# Patient Record
Sex: MALE | Race: WHITE | NOT HISPANIC OR LATINO | Employment: PART TIME | URBAN - METROPOLITAN AREA
[De-identification: names, ages, dates, MRNs, and addresses within clinical notes are randomized per-mention and may not be internally consistent; named-entity substitution may affect disease eponyms.]

---

## 2017-01-31 ENCOUNTER — HOSPITAL ENCOUNTER (EMERGENCY)
Facility: HOSPITAL | Age: 57
Discharge: HOME | End: 2017-02-01
Attending: EMERGENCY MEDICINE
Payer: OTHER MISCELLANEOUS

## 2017-01-31 ENCOUNTER — APPOINTMENT (EMERGENCY)
Dept: RADIOLOGY | Facility: HOSPITAL | Age: 57
End: 2017-01-31
Payer: OTHER MISCELLANEOUS

## 2017-01-31 DIAGNOSIS — S42.121A CLOSED DISPLACED FRACTURE OF RIGHT ACROMIAL PROCESS, INITIAL ENCOUNTER: Primary | ICD-10-CM

## 2017-01-31 DIAGNOSIS — I10 HYPERTENSION: ICD-10-CM

## 2017-01-31 PROCEDURE — 73030 X-RAY EXAM OF SHOULDER: CPT

## 2017-01-31 RX ORDER — ACETAMINOPHEN 325 MG/1
650 TABLET ORAL ONCE
Status: COMPLETED | OUTPATIENT
Start: 2017-01-31 | End: 2017-01-31

## 2017-01-31 RX ADMIN — ACETAMINOPHEN 650 MG: 325 TABLET ORAL at 23:52

## 2017-02-01 VITALS
BODY MASS INDEX: 28.7 KG/M2 | SYSTOLIC BLOOD PRESSURE: 236 MMHG | WEIGHT: 205 LBS | TEMPERATURE: 98.6 F | DIASTOLIC BLOOD PRESSURE: 109 MMHG | OXYGEN SATURATION: 95 % | RESPIRATION RATE: 18 BRPM | HEIGHT: 71 IN | HEART RATE: 80 BPM

## 2017-02-01 PROCEDURE — 99284 EMERGENCY DEPT VISIT MOD MDM: CPT

## 2017-02-01 RX ORDER — HYDROCODONE BITARTRATE AND ACETAMINOPHEN 5; 325 MG/1; MG/1
1 TABLET ORAL ONCE
Status: COMPLETED | OUTPATIENT
Start: 2017-02-01 | End: 2017-02-01

## 2017-02-01 RX ORDER — HYDROCODONE BITARTRATE AND ACETAMINOPHEN 5; 325 MG/1; MG/1
1 TABLET ORAL EVERY 6 HOURS PRN
Qty: 20 TABLET | Refills: 0 | Status: SHIPPED | OUTPATIENT
Start: 2017-02-01 | End: 2020-02-26 | Stop reason: ALTCHOICE

## 2017-02-01 RX ADMIN — HYDROCODONE BITARTATE AND ACETAMINOPHEN 1 TABLET: 5; 325 TABLET ORAL at 00:10

## 2018-12-05 ENCOUNTER — HOSPITAL ENCOUNTER (EMERGENCY)
Facility: HOSPITAL | Age: 58
Discharge: HOME | End: 2018-12-05
Attending: EMERGENCY MEDICINE
Payer: COMMERCIAL

## 2018-12-05 ENCOUNTER — APPOINTMENT (EMERGENCY)
Dept: CT IMAGING | Facility: HOSPITAL | Age: 58
End: 2018-12-05
Payer: COMMERCIAL

## 2018-12-05 VITALS
WEIGHT: 211 LBS | SYSTOLIC BLOOD PRESSURE: 168 MMHG | HEART RATE: 72 BPM | OXYGEN SATURATION: 96 % | RESPIRATION RATE: 18 BRPM | DIASTOLIC BLOOD PRESSURE: 90 MMHG | TEMPERATURE: 98.7 F | BODY MASS INDEX: 29.43 KG/M2

## 2018-12-05 DIAGNOSIS — I10 HTN (HYPERTENSION): Primary | ICD-10-CM

## 2018-12-05 LAB
ALBUMIN SERPL BCP-MCNC: 3.7 G/DL (ref 3.5–5)
ALP SERPL-CCNC: 54 U/L (ref 46–116)
ALT SERPL W P-5'-P-CCNC: 32 U/L (ref 12–78)
ANION GAP SERPL CALCULATED.3IONS-SCNC: 10 MMOL/L (ref 4–13)
AST SERPL W P-5'-P-CCNC: 17 U/L (ref 5–45)
BASOPHILS # BLD AUTO: 0.05 THOUSANDS/ΜL (ref 0–0.1)
BASOPHILS NFR BLD AUTO: 1 % (ref 0–1)
BILIRUB SERPL-MCNC: 0.3 MG/DL (ref 0.2–1)
BUN SERPL-MCNC: 20 MG/DL (ref 5–25)
CALCIUM SERPL-MCNC: 8.7 MG/DL (ref 8.3–10.1)
CHLORIDE SERPL-SCNC: 102 MMOL/L (ref 100–108)
CO2 SERPL-SCNC: 27 MMOL/L (ref 21–32)
CREAT SERPL-MCNC: 0.93 MG/DL (ref 0.6–1.3)
EOSINOPHIL # BLD AUTO: 0.2 THOUSAND/ΜL (ref 0–0.61)
EOSINOPHIL NFR BLD AUTO: 3 % (ref 0–6)
ERYTHROCYTE [DISTWIDTH] IN BLOOD BY AUTOMATED COUNT: 12.2 % (ref 11.6–15.1)
GFR SERPL CREATININE-BSD FRML MDRD: 91 ML/MIN/1.73SQ M
GLUCOSE SERPL-MCNC: 236 MG/DL (ref 65–140)
HCT VFR BLD AUTO: 40.8 % (ref 36.5–49.3)
HGB BLD-MCNC: 14.8 G/DL (ref 12–17)
IMM GRANULOCYTES # BLD AUTO: 0.02 THOUSAND/UL (ref 0–0.2)
IMM GRANULOCYTES NFR BLD AUTO: 0 % (ref 0–2)
LYMPHOCYTES # BLD AUTO: 2.1 THOUSANDS/ΜL (ref 0.6–4.47)
LYMPHOCYTES NFR BLD AUTO: 30 % (ref 14–44)
MCH RBC QN AUTO: 29.5 PG (ref 26.8–34.3)
MCHC RBC AUTO-ENTMCNC: 36.3 G/DL (ref 31.4–37.4)
MCV RBC AUTO: 81 FL (ref 82–98)
MONOCYTES # BLD AUTO: 0.68 THOUSAND/ΜL (ref 0.17–1.22)
MONOCYTES NFR BLD AUTO: 10 % (ref 4–12)
NEUTROPHILS # BLD AUTO: 3.98 THOUSANDS/ΜL (ref 1.85–7.62)
NEUTS SEG NFR BLD AUTO: 56 % (ref 43–75)
NRBC BLD AUTO-RTO: 0 /100 WBCS
PLATELET # BLD AUTO: 236 THOUSANDS/UL (ref 149–390)
PMV BLD AUTO: 11.1 FL (ref 8.9–12.7)
POTASSIUM SERPL-SCNC: 3.7 MMOL/L (ref 3.5–5.3)
PROT SERPL-MCNC: 7.3 G/DL (ref 6.4–8.2)
RBC # BLD AUTO: 5.01 MILLION/UL (ref 3.88–5.62)
SODIUM SERPL-SCNC: 139 MMOL/L (ref 136–145)
TROPONIN I SERPL-MCNC: 0.02 NG/ML
WBC # BLD AUTO: 7.03 THOUSAND/UL (ref 4.31–10.16)

## 2018-12-05 PROCEDURE — 70450 CT HEAD/BRAIN W/O DYE: CPT

## 2018-12-05 PROCEDURE — 36415 COLL VENOUS BLD VENIPUNCTURE: CPT | Performed by: EMERGENCY MEDICINE

## 2018-12-05 PROCEDURE — 84484 ASSAY OF TROPONIN QUANT: CPT | Performed by: EMERGENCY MEDICINE

## 2018-12-05 PROCEDURE — 96374 THER/PROPH/DIAG INJ IV PUSH: CPT

## 2018-12-05 PROCEDURE — 80053 COMPREHEN METABOLIC PANEL: CPT | Performed by: EMERGENCY MEDICINE

## 2018-12-05 PROCEDURE — 99284 EMERGENCY DEPT VISIT MOD MDM: CPT

## 2018-12-05 PROCEDURE — 93005 ELECTROCARDIOGRAM TRACING: CPT

## 2018-12-05 PROCEDURE — 85025 COMPLETE CBC W/AUTO DIFF WBC: CPT | Performed by: EMERGENCY MEDICINE

## 2018-12-05 RX ORDER — HYDROCHLOROTHIAZIDE 25 MG/1
25 TABLET ORAL DAILY
Qty: 20 TABLET | Refills: 0 | Status: SHIPPED | OUTPATIENT
Start: 2018-12-05 | End: 2019-10-09 | Stop reason: ALTCHOICE

## 2018-12-05 RX ORDER — CLONIDINE HYDROCHLORIDE 0.1 MG/1
0.2 TABLET ORAL ONCE
Status: COMPLETED | OUTPATIENT
Start: 2018-12-05 | End: 2018-12-05

## 2018-12-05 RX ORDER — LABETALOL HYDROCHLORIDE 5 MG/ML
10 INJECTION, SOLUTION INTRAVENOUS ONCE
Status: COMPLETED | OUTPATIENT
Start: 2018-12-05 | End: 2018-12-05

## 2018-12-05 RX ADMIN — LABETALOL 20 MG/4 ML (5 MG/ML) INTRAVENOUS SYRINGE 10 MG: at 17:26

## 2018-12-05 RX ADMIN — CLONIDINE HYDROCHLORIDE 0.2 MG: 0.1 TABLET ORAL at 17:25

## 2018-12-05 NOTE — ED PROVIDER NOTES
History  Chief Complaint   Patient presents with    Hypertension     patient states that he was sent by pcp for evaluation of blood pressure  was 223/150 in the office  +headache, denies chest pain     Pt  Saw his family dr  For a regular scheduled appt  And he was sent to the ER for HTN  He Used to be on 3 different meds for HTN, last took them 4 years ago because of insurance/financial reasons  Hasn't seen his family dr  In 4 years and went back today because he has insurance now   His blood pressure was 220's/150's in the office and they sent him to the ER for evaluate  Upon arrival to ER, bp is 254/135    +headache every day for the past week  No n/v, no fevers, no neck pain,  No injury  No cp, no sob  Prior to Admission Medications   Prescriptions Last Dose Informant Patient Reported? Taking? HYDROcodone-acetaminophen (NORCO) 5-325 mg per tablet   No No   Sig: Take 1 tablet by mouth every 6 (six) hours as needed for pain Max Daily Amount: 4 tablets      Facility-Administered Medications: None       Past Medical History:   Diagnosis Date    Hypertension        Past Surgical History:   Procedure Laterality Date    CARPAL TUNNEL RELEASE      CYST REMOVAL      EYE SURGERY         History reviewed  No pertinent family history  I have reviewed and agree with the history as documented  Social History   Substance Use Topics    Smoking status: Current Every Day Smoker     Types: Cigars    Smokeless tobacco: Current User     Types: Chew    Alcohol use Yes      Comment: hx of        Review of Systems   Constitutional: Negative for appetite change, fatigue and fever  HENT: Negative for rhinorrhea and sore throat  Respiratory: Negative for cough, shortness of breath and wheezing  Cardiovascular: Negative for chest pain and leg swelling  Gastrointestinal: Negative for abdominal pain, diarrhea and vomiting  Genitourinary: Negative for dysuria and flank pain     Musculoskeletal: Negative for back pain and neck pain  Skin: Negative for rash  Neurological: Positive for headaches  Negative for syncope  Psychiatric/Behavioral:        Mood normal       Physical Exam  Physical Exam   Constitutional: He is oriented to person, place, and time  He appears well-developed and well-nourished  HENT:   Head: Normocephalic and atraumatic  Mouth/Throat: Oropharynx is clear and moist    Eyes: Pupils are equal, round, and reactive to light  Neck: Normal range of motion  Neck supple  Cardiovascular: Normal rate and regular rhythm  Pulmonary/Chest: Effort normal and breath sounds normal    Abdominal: Soft  There is no tenderness  Musculoskeletal: Normal range of motion  Neurological: He is alert and oriented to person, place, and time  No cranial nerve deficit  Skin: Skin is warm and dry  Nursing note and vitals reviewed        Vital Signs  ED Triage Vitals [12/05/18 1655]   Temperature Pulse Respirations Blood Pressure SpO2   98 7 °F (37 1 °C) 82 16 (!) 254/135 96 %      Temp Source Heart Rate Source Patient Position - Orthostatic VS BP Location FiO2 (%)   Oral Monitor Sitting Right arm --      Pain Score       6           Vitals:    12/05/18 1730 12/05/18 1810 12/05/18 1830 12/05/18 1847   BP: (!) 215/106 (!) 203/100 166/90 168/90   Pulse:  71 69 72   Patient Position - Orthostatic VS: Lying Lying Lying Lying       Visual Acuity      ED Medications  Medications   cloNIDine (CATAPRES) tablet 0 2 mg (0 2 mg Oral Given 12/5/18 1725)   labetalol (NORMODYNE) injection 10 mg (10 mg Intravenous Given 12/5/18 1726)       Diagnostic Studies  Results Reviewed     Procedure Component Value Units Date/Time    Comprehensive metabolic panel [[id number]]  (Abnormal) Collected:  12/05/18 1726    Lab Status:  Final result Specimen:  Blood from Arm, Left Updated:  12/05/18 1800     Sodium 139 mmol/L      Potassium 3 7 mmol/L      Chloride 102 mmol/L      CO2 27 mmol/L      ANION GAP 10 mmol/L      BUN 20 mg/dL      Creatinine 0 93 mg/dL      Glucose 236 (H) mg/dL      Calcium 8 7 mg/dL      AST 17 U/L      ALT 32 U/L      Alkaline Phosphatase 54 U/L      Total Protein 7 3 g/dL      Albumin 3 7 g/dL      Total Bilirubin 0 30 mg/dL      eGFR 91 ml/min/1 73sq m     Narrative:         National Kidney Disease Education Program recommendations are as follows:  GFR calculation is accurate only with a steady state creatinine  Chronic Kidney disease less than 60 ml/min/1 73 sq  meters  Kidney failure less than 15 ml/min/1 73 sq  meters  Troponin I [[id number]]  (Normal) Collected:  12/05/18 1726    Lab Status:  Final result Specimen:  Blood from Arm, Left Updated:  12/05/18 1757     Troponin I 0 02 ng/mL     CBC and differential [[id number]]  (Abnormal) Collected:  12/05/18 1726    Lab Status:  Final result Specimen:  Blood from Arm, Left Updated:  12/05/18 1735     WBC 7 03 Thousand/uL      RBC 5 01 Million/uL      Hemoglobin 14 8 g/dL      Hematocrit 40 8 %      MCV 81 (L) fL      MCH 29 5 pg      MCHC 36 3 g/dL      RDW 12 2 %      MPV 11 1 fL      Platelets 260 Thousands/uL      nRBC 0 /100 WBCs      Neutrophils Relative 56 %      Immat GRANS % 0 %      Lymphocytes Relative 30 %      Monocytes Relative 10 %      Eosinophils Relative 3 %      Basophils Relative 1 %      Neutrophils Absolute 3 98 Thousands/µL      Immature Grans Absolute 0 02 Thousand/uL      Lymphocytes Absolute 2 10 Thousands/µL      Monocytes Absolute 0 68 Thousand/µL      Eosinophils Absolute 0 20 Thousand/µL      Basophils Absolute 0 05 Thousands/µL                  CT head without contrast   Final Result by Nany Schneider DO (12/05 1821)   No acute intracranial abnormality                    Workstation performed: [id number]                    Procedures  ECG 12 Lead Documentation  Date/Time: 12/5/2018 5:38 PM  Performed by: SHELTON Diaz  Authorized by: SHELTON Diaz     Rate:     ECG rate:  74    ECG rate assessment: normal Rhythm:     Rhythm: sinus rhythm    Comments:      No st elevation or depression           Phone Contacts  ED Phone Contact    ED Course                               MDM  Number of Diagnoses or Management Options  HTN (hypertension):      Amount and/or Complexity of Data Reviewed  Clinical lab tests: ordered and reviewed  Tests in the radiology section of CPT®: ordered and reviewed    Risk of Complications, Morbidity, and/or Mortality  Presenting problems: moderate  General comments: Pt  Felt better in ER, no more headache after bp lowered to 160's/90's    Stable for outpt  Follow up with his family dr Marcos Celeste Time    Disposition  Final diagnoses:   HTN (hypertension)     Time reflects when diagnosis was documented in both MDM as applicable and the Disposition within this note     Time User Action Codes Description Comment    12/5/2018  6:43 PM Indu Urban Add [I10] HTN (hypertension)       ED Disposition     ED Disposition Condition Comment    Discharge  Claudia Spring discharge to home/self care  Condition at discharge: Stable        Follow-up Information     Follow up With Specialties Details Why [address], [address]   [address]  [address] [place] [address]  [phone]            Discharge Medication List as of 12/5/2018  6:44 PM      START taking these medications    Details   hydrochlorothiazide (HYDRODIURIL) 25 mg tablet Take 1 tablet (25 mg total) by mouth daily, Starting Wed 12/5/2018, Print         CONTINUE these medications which have NOT CHANGED    Details   HYDROcodone-acetaminophen (NORCO) 5-325 mg per tablet Take 1 tablet by mouth every 6 (six) hours as needed for pain Max Daily Amount: 4 tablets, Starting 2/1/2017, Until Discontinued, Print           No discharge procedures on file      ED Provider  Electronically Signed by           Julien Amaya MD  12/05/18 1132

## 2018-12-05 NOTE — DISCHARGE INSTRUCTIONS
Chronic Hypertension, Ambulatory Care   GENERAL INFORMATION:   Chronic hypertension  is a long-term condition in which your blood pressure (BP) is higher than normal  Your BP is the force of your blood moving against the walls of your arteries  Hypertension is a BP of 140/90 or higher  Common symptoms include the following:   · Headache     · Blurred vision    · Chest pain     · Dizziness or weakness     · Trouble breathing     · Nosebleeds  Seek immediate care for the following symptoms:   · Severe headache or vision loss    · Weakness in an arm or leg    · Confusion or difficulty speaking    · Discomfort in your chest that feels like squeezing, pressure, fullness, or pain    · Suddenly feeling lightheaded or trouble breathing    · Pain or discomfort in your back, neck, jaw, stomach, or arm  Treatment for chronic hypertension  may include medicine to lower your BP  You may also need to make lifestyle changes  Take your medicine exactly as directed  Manage chronic hypertension:   · Take your BP at home  Sit and rest for 5 minutes before you take your BP  Extend your arm and support it on a flat surface  Your arm should be at the same level as your heart  Follow the directions that came with your BP monitor  If possible, take at least 2 BP readings each time  Take your BP at least twice a day at the same times each day, such as morning and evening  Keep a log of your BP readings and bring it to your follow-up visits  · Eat less sodium (salt)  Do not add sodium to your food  Limit foods that are high in sodium, such as canned foods, potato chips, and cold cuts  Your healthcare provider may suggest that you follow the [address] [place] [address] Street  The plan is low in sodium, unhealthy fats, and total fat  It is high in potassium, calcium, and fiber  · Exercise regularly  Exercise at least 30 minutes per day, on most days of the week  This will help decrease your BP   Ask your healthcare provider about the best exercise plan for you  · Limit alcohol  Women should limit alcohol to 1 drink a day  Men should limit alcohol to 2 drinks a day  A drink of alcohol is 12 ounces of beer, 5 ounces of wine, or 1½ ounces of liquor  · Do not smoke  If you smoke, it is never too late to quit  Smoking can increase your BP  Smoking also worsens other health conditions you may have that can increase your risk for hypertension  Ask your healthcare provider for information if you need help quitting  Follow up with your healthcare provider as directed: You will need to return to have your BP checked and to have other lab tests done  Write down your questions so you remember to ask them during your visits  CARE AGREEMENT:   You have the right to help plan your care  Learn about your health condition and how it may be treated  Discuss treatment options with your caregivers to decide what care you want to receive  You always have the right to refuse treatment  The above information is an  only  It is not intended as medical advice for individual conditions or treatments  Talk to your doctor, nurse or pharmacist before following any medical regimen to see if it is safe and effective for you  © 2014 [address] is for End User's use only and may not be sold, redistributed or otherwise used for commercial purposes  All illustrations and images included in CareNotes® are the copyrighted property of A D A M , Inc  or Nathan Orr

## 2018-12-08 LAB
ATRIAL RATE: 74 BPM
P AXIS: 3 DEGREES
PR INTERVAL: 174 MS
QRS AXIS: 16 DEGREES
QRSD INTERVAL: 92 MS
QT INTERVAL: 390 MS
QTC INTERVAL: 432 MS
T WAVE AXIS: 32 DEGREES
VENTRICULAR RATE: 74 BPM

## 2018-12-08 PROCEDURE — 93010 ELECTROCARDIOGRAM REPORT: CPT | Performed by: INTERNAL MEDICINE

## 2019-07-03 ENCOUNTER — TELEPHONE (OUTPATIENT)
Dept: NEUROLOGY | Facility: CLINIC | Age: 59
End: 2019-07-03

## 2019-07-30 ENCOUNTER — TELEPHONE (OUTPATIENT)
Dept: NEUROLOGY | Facility: CLINIC | Age: 59
End: 2019-07-30

## 2019-07-30 NOTE — TELEPHONE ENCOUNTER
Called patient to see if he would be interested in coming in tomorrow July 31 at 2 to see Dr Gavin Carlson  Patient is on wait list for a sooner appointment  No answer  Left voicemail

## 2019-08-05 ENCOUNTER — TELEPHONE (OUTPATIENT)
Dept: NEUROLOGY | Facility: CLINIC | Age: 59
End: 2019-08-05

## 2019-08-07 ENCOUNTER — HOSPITAL ENCOUNTER (EMERGENCY)
Facility: HOSPITAL | Age: 59
Discharge: HOME | End: 2019-08-07
Attending: EMERGENCY MEDICINE
Payer: COMMERCIAL

## 2019-08-07 VITALS
DIASTOLIC BLOOD PRESSURE: 110 MMHG | RESPIRATION RATE: 18 BRPM | SYSTOLIC BLOOD PRESSURE: 193 MMHG | TEMPERATURE: 99.2 F | HEART RATE: 108 BPM | OXYGEN SATURATION: 95 %

## 2019-08-07 DIAGNOSIS — S61.011A LACERATION OF RIGHT THUMB WITHOUT FOREIGN BODY, NAIL DAMAGE STATUS UNSPECIFIED, INITIAL ENCOUNTER: Primary | ICD-10-CM

## 2019-08-07 PROCEDURE — 99283 EMERGENCY DEPT VISIT LOW MDM: CPT | Performed by: NURSE PRACTITIONER

## 2019-08-07 PROCEDURE — 99282 EMERGENCY DEPT VISIT SF MDM: CPT

## 2019-08-07 RX ORDER — IBUPROFEN 600 MG/1
600 TABLET ORAL ONCE
Status: COMPLETED | OUTPATIENT
Start: 2019-08-07 | End: 2019-08-07

## 2019-08-07 RX ORDER — IBUPROFEN 800 MG/1
800 TABLET ORAL 3 TIMES DAILY
Qty: 21 TABLET | Refills: 0 | Status: SHIPPED | OUTPATIENT
Start: 2019-08-07 | End: 2019-10-09 | Stop reason: SDUPTHER

## 2019-08-07 RX ADMIN — IBUPROFEN 600 MG: 600 TABLET ORAL at 22:29

## 2019-08-08 NOTE — ED PROVIDER NOTES
History  Chief Complaint   Patient presents with    Finger Laceration     Per Pt " I was slicing onions and I cut my R thumb "      This is a 27-year-old male patient presents with right thumb laceration skin avulsion from mandoline  He was cutting onions and accidentally sliced off the distal finger tip of the right thumb  Reports that he immediately washed the wound with soap and water and applied pressure  He presents here no longer bleeding is approximately 1 cm right 2 cm  Reports his tetanus is up-to-date  Prior to Admission Medications   Prescriptions Last Dose Informant Patient Reported? Taking? HYDROcodone-acetaminophen (NORCO) 5-325 mg per tablet   No No   Sig: Take 1 tablet by mouth every 6 (six) hours as needed for pain Max Daily Amount: 4 tablets   hydrochlorothiazide (HYDRODIURIL) 25 mg tablet   No No   Sig: Take 1 tablet (25 mg total) by mouth daily      Facility-Administered Medications: None       Past Medical History:   Diagnosis Date    Hypertension        Past Surgical History:   Procedure Laterality Date    CARPAL TUNNEL RELEASE      CYST REMOVAL      EYE SURGERY         History reviewed  No pertinent family history  I have reviewed and agree with the history as documented  Social History     Tobacco Use    Smoking status: Current Every Day Smoker     Types: Cigars    Smokeless tobacco: Current User     Types: Chew   Substance Use Topics    Alcohol use: Yes     Comment: hx of    Drug use: No        Review of Systems   Constitutional: Negative for diaphoresis, fatigue and fever  HENT: Negative for congestion, ear pain, nosebleeds and sore throat  Eyes: Negative for photophobia, pain, discharge and visual disturbance  Respiratory: Negative for cough, choking, chest tightness, shortness of breath and wheezing  Cardiovascular: Negative for chest pain and palpitations  Gastrointestinal: Negative for abdominal distention, abdominal pain, diarrhea and vomiting  Genitourinary: Negative for dysuria, flank pain and frequency  Musculoskeletal: Negative for back pain, gait problem and joint swelling  Skin: Positive for wound  Negative for color change and rash  Neurological: Negative for dizziness, syncope and headaches  Psychiatric/Behavioral: Negative for behavioral problems and confusion  The patient is not nervous/anxious  All other systems reviewed and are negative  Physical Exam  Physical Exam   Constitutional: He is oriented to person, place, and time  He appears well-developed and well-nourished  HENT:   Head: Normocephalic and atraumatic  Eyes: Pupils are equal, round, and reactive to light  Neck: Normal range of motion  Neck supple  Cardiovascular: Normal rate, regular rhythm, normal heart sounds and normal pulses  PMI is not displaced  Pulmonary/Chest: Effort normal and breath sounds normal  No respiratory distress  Abdominal: Soft  He exhibits no distension  There is no guarding  Musculoskeletal: Normal range of motion  Lymphadenopathy:     He has no cervical adenopathy  Neurological: He is alert and oriented to person, place, and time  Skin: Skin is warm and dry  No rash noted  He is not diaphoretic  No pallor  Right thumb skin laceration avulsion 1 cm x 2 cm  No deep tissue involvement  Superficial   No longer bleeding  Psychiatric: He has a normal mood and affect  Vitals reviewed        Vital Signs  ED Triage Vitals [08/07/19 2115]   Temperature Pulse Respirations Blood Pressure SpO2   99 2 °F (37 3 °C) (!) 108 18 (!) 193/110 95 %      Temp Source Heart Rate Source Patient Position - Orthostatic VS BP Location FiO2 (%)   Oral Monitor Sitting Left arm --      Pain Score       --           Vitals:    08/07/19 2115   BP: (!) 193/110   Pulse: (!) 108   Patient Position - Orthostatic VS: Sitting         Visual Acuity      ED Medications  Medications   ibuprofen (MOTRIN) tablet 600 mg (has no administration in time range) Diagnostic Studies  Results Reviewed     None                 No orders to display              Procedures  Procedures       ED Course                               MDM  Number of Diagnoses or Management Options  Laceration of right thumb without foreign body, nail damage status unspecified, initial encounter: new and requires workup  Diagnosis management comments: The right thumb laceration wound was dressed with surgifoam topical hemostatic, Xeroform gauze, plain gauze wrap, followed by [facility]  Provided supplies for the next 2-3 days for daily dressing changes    Patient Progress  Patient progress: stable      Disposition  Final diagnoses:   Laceration of right thumb without foreign body, nail damage status unspecified, initial encounter     Time reflects when diagnosis was documented in both MDM as applicable and the Disposition within this note     Time User Action Codes Description Comment    8/7/2019 10:12 PM Daryl Estrada Add [S61 011A] Laceration of right thumb without foreign body, nail damage status unspecified, initial encounter       ED Disposition     ED Disposition Condition Date/Time Comment    Discharge Stable Wed Aug 7, 2019 10:12 PM Laxmi Kemp discharge to home/self care  Follow-up Information     Follow up With Specialties Details Why [address] MD Family Medicine Schedule an appointment as soon as possible for a visit  As needed, For Recheck [place] [address]  [address]  [place]  [address] [place] [address]  [phone]            Patient's Medications   Discharge Prescriptions    IBUPROFEN (MOTRIN) 800 MG TABLET    Take 1 tablet (800 mg total) by mouth 3 (three) times a day       Start Date: 8/7/2019  End Date: --       Order Dose: 800 mg       Quantity: 21 tablet    Refills: 0     No discharge procedures on file      ED Provider  Electronically Signed by           JORDYN Soler  08/07/19 2821

## 2019-09-11 ENCOUNTER — EVALUATION (OUTPATIENT)
Dept: PHYSICAL THERAPY | Facility: CLINIC | Age: 59
End: 2019-09-11
Payer: COMMERCIAL

## 2019-09-11 DIAGNOSIS — M75.111 INCOMPLETE TEAR OF RIGHT ROTATOR CUFF, UNSPECIFIED WHETHER TRAUMATIC: Primary | ICD-10-CM

## 2019-09-11 PROCEDURE — 97140 MANUAL THERAPY 1/> REGIONS: CPT

## 2019-09-11 PROCEDURE — 97535 SELF CARE MNGMENT TRAINING: CPT

## 2019-09-11 PROCEDURE — 97161 PT EVAL LOW COMPLEX 20 MIN: CPT

## 2019-09-11 NOTE — LETTER
2019    MD [address], [address] [facility]    Patient: Ariel Salter   YOB: 1960   Date of Visit: 2019     Encounter Diagnosis     ICD-10-CM    1  Incomplete tear of right rotator cuff, unspecified whether traumatic M75 111        Dear Dr Yolanda Nicholas: Thank you for your recent referral of Ariel Salter  Please review the attached evaluation summary from Farzad's recent visit  Please verify that you agree with the plan of care by signing the attached order  If you have any questions or concerns, please do not hesitate to call  I sincerely appreciate the opportunity to share in the care of one of your patients and hope to have another opportunity to work with you in the near future  Sincerely,    Sylvester Sy, PT      Referring Provider:      I certify that I have read the below Plan of Care and certify the need for these services furnished under this plan of treatment while under my care  MD Olga Pat[address]io [address]: [phone]          PT Evaluation     Today's date: 2019  Patient name: Ariel Salter  : 1960  MRN: [id number]  Referring provider: Fatimah Graham MD  Dx:   Encounter Diagnosis     ICD-10-CM    1  Incomplete tear of right rotator cuff, unspecified whether traumatic M75 111                   Assessment  Assessment details: Pt presents s/p Right RTC repair on 19  Hx prior right shoulder surgery 7-8 years ago  Presents in sling but reports RUE out of sling at home and with sleep  Reports continued pain right shoulder as well as altered sensation right hand  PT notes increased tenderness to palpation right shoulder and atrophy of shoulder and UE musculature  Decreased ROM noted all planes  Strength not tested  PT cautioned pt on use of RUE and to continue use of sling    Pt will benefit from PT tx to decrease symptoms and restore normal functional level  Impairments: abnormal or restricted ROM, activity intolerance, impaired physical strength, lacks appropriate home exercise program and pain with function    Goals  ST  Decrease pain 50% 8 wk  2  Increase shoulder PROM to [facility] 8 wk  3  Increase shoulder strength to 3+/5 8 wk  Pt will report no difficulty with activity below shoulder level 8 wk  LT  Pt will report no pain 16 wk  2  Increase shoulder AROM to WNL 16 wk  3  Increase shoulder strength to WNL 16 wk  Pt will report no limitations with ADL's 16 wk    Plan  Patient would benefit from: PT eval  Planned modality interventions: cryotherapy, thermotherapy: hydrocollator packs and unattended electrical stimulation  Planned therapy interventions: joint mobilization, manual therapy, strengthening, stretching, therapeutic activities, therapeutic exercise, flexibility, functional ROM exercises and home exercise program  Frequency: 2x week  Duration in weeks: 16  Treatment plan discussed with: patient        Subjective Evaluation    History of Present Illness  Date of surgery: 2019  Mechanism of injury: Pt presents s/p right RTC repair on 19  Reports prior right shoulder surgery approx 7-8 years ago as well  Reports falling 2 years ago onto right arm/elbow and hearing "snap/pop"  Went to ER and reports he had fx  Reports pain and limited use of UE since that time  Functional level varied  RTC surgery advised to help with this  Reports continued pain since surgery  Reports intermittent numbness right hand and tingling sensation down RUE  Next MD follow up 19  Reports he is not using sling during the day or with sleep at night  Reports using RUE to assist lifting grandson and with ADL's in limited manner  Has returned to driving      Pain  Current pain ratin  At best pain ratin  At worst pain ratin  Location: Right shoulder  Quality: dull ache and sharp    Hand dominance: right  Exercise history: enjoys [employer]   Life stress: Works as [employer]  Has not been able to do this due to shoulder  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  Objective     Observations     Right Shoulder  Positive for atrophy and incision  Additional Observation Details  Atrophy noted right shoulder/UE  Old well healed incision noted AC joint region  Well healing incisions from current surgery     Tenderness     Right Shoulder  Tenderness in the biceps tendon (proximal), clavicle, lateral scapula and medial scapula  Additional Tenderness Details  Increased tenderness over anterior portal incision    Cervical/Thoracic Screen   Cervical range of motion within normal limits    Passive Range of Motion     Right Shoulder   Flexion: 95 degrees   Abduction: 65 degrees   External rotation 45°:  24 degrees   Internal rotation 45°:  45 degrees     Right Elbow   Normal passive range of motion    Strength/Myotome Testing     Additional Strength Details  Strength not tested                Precautions:  S/P Right RTC Tear    See Protocol       Manual  9-11-19       Right Shoulder PROM 15'                                           Exercise Diary  9-11-19       Pendulums         shrugs        retractions        Elbow flex/ext        gripper        pulleys        Sub-max flex, ext, add, IR        Table slides                                                                                                HEP Instructed and issued HEP           Modalities 9-11-19       CP 5'       CP with IFC

## 2019-09-11 NOTE — PROGRESS NOTES
PT Evaluation     Today's date: 2019  Patient name: Mayito Henry  : 1960  MRN: [id number]  Referring provider: Phani Solorzano MD  Dx:   Encounter Diagnosis     ICD-10-CM    1  Incomplete tear of right rotator cuff, unspecified whether traumatic M75 111                   Assessment  Assessment details: Pt presents s/p Right RTC repair on 19  Hx prior right shoulder surgery 7-8 years ago  Presents in sling but reports RUE out of sling at home and with sleep  Reports continued pain right shoulder as well as altered sensation right hand  PT notes increased tenderness to palpation right shoulder and atrophy of shoulder and UE musculature  Decreased ROM noted all planes  Strength not tested  PT cautioned pt on use of RUE and to continue use of sling  Pt will benefit from PT tx to decrease symptoms and restore normal functional level  Impairments: abnormal or restricted ROM, activity intolerance, impaired physical strength, lacks appropriate home exercise program and pain with function    Goals  ST  Decrease pain 50% 8 wk  2  Increase shoulder PROM to [facility] 8 wk  3  Increase shoulder strength to 3+/5 8 wk  Pt will report no difficulty with activity below shoulder level 8 wk  LT  Pt will report no pain 16 wk  2  Increase shoulder AROM to WNL 16 wk  3  Increase shoulder strength to WNL 16 wk    Pt will report no limitations with ADL's 16 wk    Plan  Patient would benefit from: PT eval  Planned modality interventions: cryotherapy, thermotherapy: hydrocollator packs and unattended electrical stimulation  Planned therapy interventions: joint mobilization, manual therapy, strengthening, stretching, therapeutic activities, therapeutic exercise, flexibility, functional ROM exercises and home exercise program  Frequency: 2x week  Duration in weeks: 16  Treatment plan discussed with: patient        Subjective Evaluation    History of Present Illness  Date of surgery: 2019  Mechanism of injury: Pt presents s/p right RTC repair on 19  Reports prior right shoulder surgery approx 7-8 years ago as well  Reports falling 2 years ago onto right arm/elbow and hearing "snap/pop"  Went to ER and reports he had fx  Reports pain and limited use of UE since that time  Functional level varied  RTC surgery advised to help with this  Reports continued pain since surgery  Reports intermittent numbness right hand and tingling sensation down RUE  Next MD follow up 19  Reports he is not using sling during the day or with sleep at night  Reports using RUE to assist lifting grandson and with ADL's in limited manner  Has returned to driving  Pain  Current pain ratin  At best pain ratin  At worst pain ratin  Location: Right shoulder  Quality: dull ache and sharp    Hand dominance: right  Exercise history: enjoys [employer]   Life stress: Works as [employer]  Has not been able to do this due to shoulder  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  Objective     Observations     Right Shoulder  Positive for atrophy and incision  Additional Observation Details  Atrophy noted right shoulder/UE  Old well healed incision noted AC joint region  Well healing incisions from current surgery     Tenderness     Right Shoulder  Tenderness in the biceps tendon (proximal), clavicle, lateral scapula and medial scapula       Additional Tenderness Details  Increased tenderness over anterior portal incision    Cervical/Thoracic Screen   Cervical range of motion within normal limits    Passive Range of Motion     Right Shoulder   Flexion: 95 degrees   Abduction: 65 degrees   External rotation 45°: 24 degrees   Internal rotation 45°: 45 degrees     Right Elbow   Normal passive range of motion    Strength/Myotome Testing     Additional Strength Details  Strength not tested                Precautions:  S/P Right RTC Tear    See Protocol       Manual  9-11-19       Right Shoulder PROM 15'                                           Exercise Diary  9-11-19       Pendulums         shrugs        retractions        Elbow flex/ext        gripper        pulleys        Sub-max flex, ext, add, IR        Table slides                                                                                                HEP Instructed and issued HEP           Modalities 9-11-19       CP 5'       CP with IFC

## 2019-09-12 ENCOUNTER — OFFICE VISIT (OUTPATIENT)
Dept: PHYSICAL THERAPY | Facility: CLINIC | Age: 59
End: 2019-09-12
Payer: COMMERCIAL

## 2019-09-12 DIAGNOSIS — M75.111 INCOMPLETE TEAR OF RIGHT ROTATOR CUFF, UNSPECIFIED WHETHER TRAUMATIC: Primary | ICD-10-CM

## 2019-09-12 PROCEDURE — 97110 THERAPEUTIC EXERCISES: CPT

## 2019-09-12 PROCEDURE — 97140 MANUAL THERAPY 1/> REGIONS: CPT

## 2019-09-12 NOTE — PROGRESS NOTES
Daily Note     Today's date: 2019  Patient name: Kimberly Elliott  : 1960  MRN: [id number]  Referring provider: Keon Doe MD  Dx:   Encounter Diagnosis     ICD-10-CM    1  Incomplete tear of right rotator cuff, unspecified whether traumatic M75 111                   Subjective: The arm feels good      Objective: See treatment diary below      Assessment: Tolerated treatment well  Presented without sling and reported forgetting to wear it  Tolerated initial tx well  Gentle Tx as pt attempting to move arm actively in all planes and stretch to end ranges  PT cautioned pt again on activity with RUE and advised to wear sling  Reported no pain with TE or manual therapy  Patient would benefit from continued PT      Plan: Continue per plan of care  Precautions:  S/P Right RTC Tear    See Protocol       Manual  19      Right Shoulder PROM 15' 15'                                          Exercise Diary  19      Pendulums   30x      shrugs  20x      retractions  20x      Elbow flex/ext  20x      gripper        pulleys  5'  flex      Sub-max flex, ext, add, IR        Table slides                                                                                                HEP Instructed and issued HEP           Modalities 19      CP 5' 10'      CP with IFC

## 2019-09-17 ENCOUNTER — OFFICE VISIT (OUTPATIENT)
Dept: PHYSICAL THERAPY | Facility: CLINIC | Age: 59
End: 2019-09-17
Payer: COMMERCIAL

## 2019-09-17 DIAGNOSIS — M75.111 INCOMPLETE TEAR OF RIGHT ROTATOR CUFF, UNSPECIFIED WHETHER TRAUMATIC: Primary | ICD-10-CM

## 2019-09-17 PROCEDURE — 97110 THERAPEUTIC EXERCISES: CPT

## 2019-09-17 PROCEDURE — 97140 MANUAL THERAPY 1/> REGIONS: CPT

## 2019-09-17 NOTE — PROGRESS NOTES
Daily Note     Today's date: 2019  Patient name: Jon Drew  : 1960  MRN: [id number]  Referring provider: Noemi Harman MD  Dx:   Encounter Diagnosis     ICD-10-CM    1  Incomplete tear of right rotator cuff, unspecified whether traumatic M75 111                   Subjective:  My shoulder is really sore today  I worked this weekend  I was carrying crates and then helped with doing 2  lines  I was pulling a machine with that  I could hardly sleep over the weekend because it hurt so much  I hope I didn't do anything to it  Objective: See treatment diary below      Assessment: Tolerated treatment fair  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 [employer]  Reports carrying crates as well, pulling machines, and doing household and  line work  PT advised against work activity and cautioned him on any activity at this point  Reports no use of ice  Reports minimal relief with medications this weekend  Decreased tolerance noted to TE and manual therapy due to pain  Patient would benefit from continued PT      Plan: Continue per plan of care  Precautions:  S/P Right RTC Tear    See Protocol       Manual  19     Right Shoulder PROM 15' 15' 10'                                         Exercise Diary  19     Pendulums   30x 30x     shrugs  20x 30x     retractions  20x 30x     Elbow flex/ext  20x 30x     gripper        pulleys  5'  flex 5'     Sub-max flex, ext, add, IR   15x  5" ea     Table slides                                                                                                HEP Instructed and issued HEP           Modalities 19     CP 5' 10' 10'     CP with IFC

## 2019-09-19 ENCOUNTER — OFFICE VISIT (OUTPATIENT)
Dept: PHYSICAL THERAPY | Facility: CLINIC | Age: 59
End: 2019-09-19
Payer: COMMERCIAL

## 2019-09-19 DIAGNOSIS — M75.111 INCOMPLETE TEAR OF RIGHT ROTATOR CUFF, UNSPECIFIED WHETHER TRAUMATIC: Primary | ICD-10-CM

## 2019-09-19 PROCEDURE — 97110 THERAPEUTIC EXERCISES: CPT

## 2019-09-19 PROCEDURE — 97140 MANUAL THERAPY 1/> REGIONS: CPT

## 2019-09-19 NOTE — PROGRESS NOTES
Daily Note     Today's date: 2019  Patient name: Maicol Patricio  : 1960  MRN: [id number]  Referring provider: Jessica Waldrop MD  Dx:   Encounter Diagnosis     ICD-10-CM    1  Incomplete tear of right rotator cuff, unspecified whether traumatic M75 111                   Subjective:  Pt reports feeling very tired due to poor sleep in recliner  Reports continued increased shoulder pain since the weekend  Reports lifting grandchild and limited use of sling  Objective: See treatment diary below  Reports constant 7/10 pain today      Assessment: Tolerated treatment fair  Continued pain noted right shoulder since this weekend  Improved tolerance to manual therapy noted  Pt questioned using push mower and week natasha which PT advised against   Reports he will be working again this weekend  Patient would benefit from continued PT      Plan: Continue per plan of care  Precautions:  S/P Right RTC Tear    See Protocol       Manual  19 6-78-85 19    Right Shoulder PROM 15' 15' 10' 15'                                        Exercise Diary  19    Pendulums   30x 30x 30x    shrugs  20x 30x 30x    retractions  20x 30x 30x    Elbow flex/ext  20x 30x 30x    gripper        pulleys  5'  flex 5' 5'    Sub-max flex, ext, add, IR   15x  5" ea 15x  5"  ea    Table slides                                                                                                HEP Instructed and issued HEP           Modalities 9-11-19 9-12-19 3-21-19 9-19-19    CP 5' 10' 10' 10'    CP with IFC

## 2019-09-20 ENCOUNTER — TRANSCRIBE ORDERS (OUTPATIENT)
Dept: NEUROLOGY | Facility: CLINIC | Age: 59
End: 2019-09-20

## 2019-09-20 DIAGNOSIS — M54.2 CERVICALGIA: Primary | ICD-10-CM

## 2019-09-23 ENCOUNTER — TELEPHONE (OUTPATIENT)
Dept: NEUROLOGY | Facility: CLINIC | Age: 59
End: 2019-09-23

## 2019-09-23 NOTE — TELEPHONE ENCOUNTER
Dallas Mata at [facility] are processing the Phys Order at this time  Promise to fax over to me in [facility] and Diane Parra at [facility]

## 2019-09-24 ENCOUNTER — OFFICE VISIT (OUTPATIENT)
Dept: PHYSICAL THERAPY | Facility: CLINIC | Age: 59
End: 2019-09-24
Payer: COMMERCIAL

## 2019-09-24 DIAGNOSIS — M75.111 INCOMPLETE TEAR OF RIGHT ROTATOR CUFF, UNSPECIFIED WHETHER TRAUMATIC: Primary | ICD-10-CM

## 2019-09-24 PROCEDURE — 97014 ELECTRIC STIMULATION THERAPY: CPT

## 2019-09-24 PROCEDURE — 97140 MANUAL THERAPY 1/> REGIONS: CPT

## 2019-09-24 PROCEDURE — 97110 THERAPEUTIC EXERCISES: CPT

## 2019-09-24 NOTE — PROGRESS NOTES
Daily Note     Today's date: 2019  Patient name: Ashely Leos  : 1960  MRN: [id number]  Referring provider: Moise Edwards MD  Dx:   Encounter Diagnosis     ICD-10-CM    1  Incomplete tear of right rotator cuff, unspecified whether traumatic M75 111                   Subjective:  Saturday I had no pain in my shoulder   the pain was 60/10  Today its 40/10  Objective: See treatment diary below      Assessment: Tolerated treatment fair  Continued increased pain reported  Reports working over the weekend  Has been driving with use of RUE and reports lifting a  into a truck  PT added new TE per MD protocol  Limited tolerance to manual therapy due to symptoms  Reports he is not using sling  Patient would benefit from continued PT      Plan: Continue per plan of care  Precautions:  S/P Right RTC Tear    See Protocol       Manual  19   Right Shoulder PROM 15' 15' 10' 15' 10'                                       Exercise Diary  19   Pendulums   30x 30x 30x    shrugs  20x 30x 30x 30x   retractions  20x 30x 30x 30x   Elbow flex/ext  20x 30x 30x 30x   gripper        pulleys  5'  flex 5' 5' 5'   Sub-max flex, ext, add, IR   15x  5" ea 15x  5"  ea 15x ea  5"   Wall slides     Flex  2x10   Orvil Pallas, Add, IR     Red  10x ea                                                                                   HEP Instructed and issued HEP           Modalities 19 4-75-32 19   CP 5' 10' 10' 10'    CP with IFC     12'

## 2019-09-26 ENCOUNTER — OFFICE VISIT (OUTPATIENT)
Dept: PHYSICAL THERAPY | Facility: CLINIC | Age: 59
End: 2019-09-26
Payer: COMMERCIAL

## 2019-09-26 DIAGNOSIS — M75.111 INCOMPLETE TEAR OF RIGHT ROTATOR CUFF, UNSPECIFIED WHETHER TRAUMATIC: Primary | ICD-10-CM

## 2019-09-26 PROCEDURE — 97014 ELECTRIC STIMULATION THERAPY: CPT

## 2019-09-26 NOTE — PROGRESS NOTES
Daily Note     Today's date: 2019  Patient name: Gina Braga  : 1960  MRN: [id number]  Referring provider: Altagracia Good MD  Dx:   Encounter Diagnosis     ICD-10-CM    1  Incomplete tear of right rotator cuff, unspecified whether traumatic M75 111                   Subjective:  My arm is horrible  I can't move it  I lifted my grandson up and then threw him up overhead in the air and when he was coming down as I was catching/lowering him I felt a "snap/pop" in the shoulder and really sharp pain  Its been terrible since and I can't move it  Objective: See treatment diary below      Assessment:  PT notes hypersensitivity to light palpation all aspect of right shoulder and right bicep to elbow  Unable to actively flex/ext right elbow at onset due to pain  RUE in guarded position with elbow at 90 degree  IFC/CP only to help with pain  Pt able to move able but with pain afterwards and perform limited pendulums  PT advised pt to rest/ice and contact MD if symptoms do not subside  Reports he will be working this weekend which PT advised against         Plan: Continue per plan of care  Precautions:  S/P Right RTC Tear    See Protocol       Manual  19   Right Shoulder PROM  15' 10' 15 10'                                       Exercise Diary  19   Pendulums   30x 30x 30x    shrugs  20x 30x 30x 30x   retractions  20x 30x 30x 30x   Elbow flex/ext  20x 30x 30x 30x   gripper        pulleys  5'  flex 5' 5' 5'   Sub-max flex, ext, add, IR   15x  5" ea 15x  5"  ea 15x ea  5"   Wall slides     Flex  2x10   [place], Add, IR     Red  10x ea                                                                                   HEP            Modalities 19   CP  10' 10' 10'    CP with IFC 15    12

## 2019-10-03 ENCOUNTER — OFFICE VISIT (OUTPATIENT)
Dept: PHYSICAL THERAPY | Facility: CLINIC | Age: 59
End: 2019-10-03
Payer: COMMERCIAL

## 2019-10-03 DIAGNOSIS — M75.111 INCOMPLETE TEAR OF RIGHT ROTATOR CUFF, UNSPECIFIED WHETHER TRAUMATIC: Primary | ICD-10-CM

## 2019-10-03 PROCEDURE — 97140 MANUAL THERAPY 1/> REGIONS: CPT

## 2019-10-03 PROCEDURE — 97110 THERAPEUTIC EXERCISES: CPT

## 2019-10-03 NOTE — PROGRESS NOTES
Daily Note     Today's date: 10/3/2019  Patient name: Yolanda Wahl  : 1960  MRN: [id number]  Referring provider: Erica Feliciano MD  Dx:   Encounter Diagnosis     ICD-10-CM    1  Incomplete tear of right rotator cuff, unspecified whether traumatic M75 111                   Subjective: The shoulder is feeling better  I saw the doctor and he thinks I tore some scar tissue last week  No more sling  Objective: See treatment diary below      Assessment: Tolerated treatment well  Improved PROM and tolerance to activity noted  Continues working with increased symptoms noted afterwards  PT cautioned pt on this again  Patient would benefit from continued PT      Plan: Continue per plan of care  Precautions:  S/P Right RTC Tear    See Protocol       Manual  9-26-19 10-3-19 9-17-19 9-19-19 9-24-19   Right Shoulder PROM  15' 10' 15' 10'                                       Exercise Diary  9-26-19 10-3-19 9-17-19 9-19-19 9-24-19   Pendulums   30x 30x 30x    shrugs  30x 30x 30x 30x   retractions  30x 30x 30x 30x   Elbow flex/ext  30x 30x 30x 30x   gripper        pulleys  5'  flex 5' 5' 5'   Sub-max flex, ext, add, IR  15x  Ea  5" 15x  5" ea 15x  5"  ea 15x ea  5"   Wall slides  2x10  flex   Flex  2x10   [place], Add, IR  Green  20x ea   Red  10x ea                                                                                   HEP            Modalities 19   CP  10' 10' 10'    CP with IFC 15    12'

## 2019-10-04 ENCOUNTER — TRANSCRIBE ORDERS (OUTPATIENT)
Dept: NEUROLOGY | Facility: CLINIC | Age: 59
End: 2019-10-04

## 2019-10-04 DIAGNOSIS — M54.2 CERVICALGIA: Primary | ICD-10-CM

## 2019-10-08 ENCOUNTER — OFFICE VISIT (OUTPATIENT)
Dept: PHYSICAL THERAPY | Facility: CLINIC | Age: 59
End: 2019-10-08
Payer: COMMERCIAL

## 2019-10-08 DIAGNOSIS — M75.111 INCOMPLETE TEAR OF RIGHT ROTATOR CUFF, UNSPECIFIED WHETHER TRAUMATIC: Primary | ICD-10-CM

## 2019-10-08 PROCEDURE — 97110 THERAPEUTIC EXERCISES: CPT

## 2019-10-08 PROCEDURE — 97140 MANUAL THERAPY 1/> REGIONS: CPT

## 2019-10-08 NOTE — PROGRESS NOTES
Daily Note     Today's date: 10/8/2019  Patient name: Altagracia Brewer  : 1960  MRN: [id number]  Referring provider: Kamran Marmolejo MD  Dx:   Encounter Diagnosis     ICD-10-CM    1  Incomplete tear of right rotator cuff, unspecified whether traumatic M75 111                   Subjective: The shoulder has been feeling better  Objective: See treatment diary below      Assessment: Tolerated treatment well  Minimal symptoms noted with tx  Improved PROM noted  Continues to report heavier lifting at work and at home  Patient would benefit from continued PT      Plan: Continue per plan of care  Precautions:  S/P Right RTC Tear    See Protocol       Manual  9-26-19 10-3-19 10-8-19 9-19-19 9-24-19   Right Shoulder PROM  15' 10' 15 10'                                       Exercise Diary  9-26-19 10-3-19 10-8-19 9-19-19 9-24-19   Pendulums   30x 30x 30x    shrugs  30x 30x 30x 30x   retractions  30x 30x 30x 30x   Elbow flex/ext  30x 30x 30x 30x   gripper        pulleys  5'  flex 5' 5' 5'   Sub-max flex, ext, add, IR  15x  Ea  5" 20x  5" ea 15x  5"  ea 15x ea  5"   Wall slides  2x10  flex 30x  Flex  2x10   T-band Ext, Row, Add, IR  Green  20x ea Green  30x ea  Red  10x ea   Sidelying Abd/ER                                                                                HEP            Modalities 9-26-19 9-12-19 10-8-19 9-19-19 9-24-19   CP  10' 10' 10'    CP with IFC 15    12'

## 2019-10-09 ENCOUNTER — CONSULT (OUTPATIENT)
Dept: NEUROLOGY | Facility: CLINIC | Age: 59
End: 2019-10-09
Payer: COMMERCIAL

## 2019-10-09 VITALS
HEART RATE: 80 BPM | SYSTOLIC BLOOD PRESSURE: 150 MMHG | BODY MASS INDEX: 31.35 KG/M2 | WEIGHT: 219 LBS | DIASTOLIC BLOOD PRESSURE: 90 MMHG | HEIGHT: 70 IN

## 2019-10-09 DIAGNOSIS — M54.12 CERVICAL RADICULOPATHY: Primary | ICD-10-CM

## 2019-10-09 DIAGNOSIS — M54.2 CERVICALGIA: ICD-10-CM

## 2019-10-09 PROCEDURE — 99244 OFF/OP CNSLTJ NEW/EST MOD 40: CPT | Performed by: PSYCHIATRY & NEUROLOGY

## 2019-10-09 RX ORDER — IBUPROFEN 800 MG/1
1 TABLET ORAL 3 TIMES DAILY
COMMUNITY

## 2019-10-09 RX ORDER — GABAPENTIN 100 MG/1
100 CAPSULE ORAL
Qty: 30 CAPSULE | Refills: 1 | Status: SHIPPED | OUTPATIENT
Start: 2019-10-09 | End: 2020-01-15

## 2019-10-09 NOTE — PROGRESS NOTES
Perico Tello is a 63 y o  male  Chief Complaint   Patient presents with    Numbness     right arm    Neck Pain       Assessment:  1  Cervical radiculopathy    2  Cervicalgia          Discussion:  Patient's MRI scan of the C-spine results were reviewed with him from 4/12/9, there is moderate to large generalized disc bulge with superimposed central protrusion with minimal extrusion around the endplates at H4-J1 and an abnormal signal intensity within the cord bilaterally at C4-C5 level where there is central spinal canal stenosis these may reflect areas of myelomalacia and also a component of congenital central spinal stenosis, patient was advised to have a repeat MRI scan and get the old MRI scan CD for us to compared, patient has not had any clinical signs and symptoms of MS also his EMG had shown severe ulnar neuropathy on the right for which she has had surgery and chronic severe right CE 7 radiculopathy, he has failed physical therapy and we discussed about medications like Neurontin he is agreeable, we will start him at 100 mg at night time, side effects of the medication discussed with patient in detail, he was advised to follow-up with Dr Yuliet Shell regarding spinal stenosis an abnormal signal on the MRI scan, the other option will be to see a pain specialist, he would like to hold off onthat, to go to the hospital if has any worsening symptoms and call me, continue follow up with his other physicians and see me back in 6-8 weeks      Subjective:    HPI   Patient is being referred by his orthopedic surgeon for his neck pain radiation the right arm for the last 2 years, patient describes as neck pain about 5-6 on 10 radiating to the right arm mostly in the ulnar aspect associated with numbness and tingling sensation, if the pain is on and off it last for a couple of hours, he does not recall of any aggravating or relieving factors, he recently had right rotator cuff surgery and is recovering from that and is in physical therapy, he is also status post right ulnar nerve surgery to the elbow in May, denies any history of trauma, no bowel and bladder incontinence, no focal weakness, no symptoms in the left arm, no other neurological complaints  Vitals:    10/09/19 1334   BP: 150/90   BP Location: Left arm   Patient Position: Sitting   Cuff Size: Adult   Pulse: 80   Weight: 99 3 kg (219 lb)   Height: 5' 10" (1 778 m)       Current Medications    Current Outpatient Medications:     albuterol (PROVENTIL HFA,VENTOLIN HFA) 90 mcg/act inhaler, Inhale 2 puffs every 6 (six) hours as needed, Disp: , Rfl:     amLODIPine (NORVASC) 10 mg tablet, Take 1 tablet by mouth Daily, Disp: , Rfl:     atorvastatin (LIPITOR) 40 mg tablet, TAKE 1 TABLET BY MOUTH NIGHTLY, Disp: , Rfl:     benazepril (LOTENSIN) 40 MG tablet, Take 1 tablet by mouth daily, Disp: , Rfl:     glipiZIDE (GLUCOTROL) 10 mg tablet, Take 10 mg by mouth 2 (two) times a day before meals, Disp: , Rfl:     HYDROcodone-acetaminophen (NORCO) 5-325 mg per tablet, Take 1 tablet by mouth every 6 (six) hours as needed for pain Max Daily Amount: 4 tablets, Disp: 20 tablet, Rfl: 0    ibuprofen (MOTRIN) 800 mg tablet, Take 1 tablet by mouth 3 (three) times a day, Disp: , Rfl:     metFORMIN (GLUCOPHAGE) 1000 MG tablet, Take 1,000 mg by mouth 2 (two) times a day, Disp: , Rfl:       Allergies  Pravastatin    Past Medical History  Past Medical History:   Diagnosis Date    Diabetes mellitus ([facility] [address] )     Hyperlipidemia     Hypertension          Past Surgical History:  Past Surgical History:   Procedure Laterality Date    CARPAL TUNNEL RELEASE      CYST REMOVAL      EYE SURGERY      LEG SURGERY      shrapnel removal    ROTATOR CUFF REPAIR Right     ULNAR NERVE TRANSPOSITION           Family History:  Family History   Adopted: Yes       Social History:   reports that he has been smoking cigars  His smokeless tobacco use includes chew  He reports that he drinks alcohol   He reports that he does not use drugs  I have reviewed the past medical history, surgical history, social and family history, current medications, allergies vitals, review of systems, and updated this information as appropriate today  Objective:    Physical Exam    Neurological Exam    GENERAL:  Cooperative in no acute distress  Well-developed and well-nourished    HEAD and NECK   Head is atraumatic normocephalic with no lesions or masses  Neck is supple with full range of motion    CARDIOVASCULAR  Carotid Arteries-no carotid bruits  NEUROLOGIC:  Mental Status-the patient is awake alert and oriented without aphasia or apraxia  Cranial Nerves: Visual fields are full to confrontation  Discs are flat  Limited exam as unable to dilate the pupils, Extraocular movements are full without nystagmus  Pupils are 2-1/2 mm and reactive  Face is symmetrical to light touch  Movements of facial expression move symmetrically  Hearing is normal to finger rub bilaterally  Soft palate lifts symmetrically  Shoulder shrug is symmetrical  Tongue is midline without atrophy  Motor: No drift is noted on arm extension  Strength is full in the upper and lower extremities with normal bulk and tone  Poor effort in upper extremity secondary to shoulder pain, slight decreased range of movement of the right shoulder above horizontal, some atrophy in the muscles of the ulnar nerve on the right forearm  Sensory: Intact to temperature and vibratory sensation in the upper and lower extremities bilaterally  Cortical function is intact  Coordination: Finger to nose testing is performed accurately  Romberg is negative  Gait reveals a normal base with symmetrical arm swing  Tandem walk is abnormal  Reflexes:     1+ and symmetrical  Toes are downgoing  Paraspinal muscle tenderness in the cervical area            ROS:  Review of Systems   Constitutional: Positive for fatigue  Negative for appetite change, chills and fever     HENT: Negative for hearing loss, tinnitus, trouble swallowing and voice change  Eyes: Negative  Negative for photophobia, pain and visual disturbance  Respiratory: Negative  Negative for shortness of breath and wheezing  Cardiovascular: Negative  Negative for chest pain and palpitations  Gastrointestinal: Negative  Negative for nausea and vomiting  Endocrine: Negative  Negative for cold intolerance and heat intolerance  Genitourinary: Negative  Negative for dysuria, frequency and urgency  Musculoskeletal: Positive for back pain, gait problem, neck pain and neck stiffness  Negative for arthralgias and myalgias  Skin: Negative  Negative for rash  Allergic/Immunologic: Negative  Neurological: Positive for numbness (right hand )  Negative for dizziness, tremors, seizures, syncope, facial asymmetry, speech difficulty, weakness, light-headedness and headaches  Hematological: Negative  Does not bruise/bleed easily  Psychiatric/Behavioral: Negative  Negative for confusion, decreased concentration, hallucinations and sleep disturbance

## 2019-10-10 ENCOUNTER — OFFICE VISIT (OUTPATIENT)
Dept: PHYSICAL THERAPY | Facility: CLINIC | Age: 59
End: 2019-10-10
Payer: COMMERCIAL

## 2019-10-10 DIAGNOSIS — M75.111 INCOMPLETE TEAR OF RIGHT ROTATOR CUFF, UNSPECIFIED WHETHER TRAUMATIC: Primary | ICD-10-CM

## 2019-10-10 PROCEDURE — 97110 THERAPEUTIC EXERCISES: CPT

## 2019-10-10 NOTE — PROGRESS NOTES
Daily Note     Today's date: 10/10/2019  Patient name: Georgette Martinez  : 1960  MRN: [id number]  Referring provider: Rosa Isela Alva MD  Dx:   Encounter Diagnosis     ICD-10-CM    1  Incomplete tear of right rotator cuff, unspecified whether traumatic M75 111                   Subjective: The shoulder is really sore today  Objective: See treatment diary below      Assessment: Tolerated treatment fair  Reports increased pain today in right shoulder  Manual therapy held due to symptoms  Increased rest periods needed with TE due to symptoms  Reports seeing spine surgeon and c-spine surgery recommended for nerve compression per pt  Patient would benefit from continued PT      Plan: Continue per plan of care  Precautions:  S/P Right RTC Tear    See Protocol       Manual  9-26-19 10-3-19 10-8-19 10-10-19 9-24-19   Right Shoulder PROM  15' 10' held 10'                                       Exercise Diary  9-26-19 10-3-19 10-8-19 10-10-19 9-24-19   Pendulums   30x 30x 30x    shrugs  30x 30x 30x 30x   retractions  30x 30x 30x 30x   Elbow flex/ext  30x 30x 30x 30x   gripper        pulleys  5'  flex 5' 5' 5'   Sub-max flex, ext, add, IR  15x  Ea  5" 20x  5" ea 20x  5"  ea 15x ea  5"   Wall slides  2x10  flex 30x 30x Flex  2x10   T-band Ext, Row, Add, IR  Green  20x ea Green  30x ea Green  30x Red  10x ea   Sidelying Abd/ER    ER 30x                                                                            HEP            Modalities 9-26-19 9-12-19 10-8-19 10-10-19 9-24-19   CP  10' 10' 10'    CP with IFC 15    12

## 2019-10-15 ENCOUNTER — OFFICE VISIT (OUTPATIENT)
Dept: PHYSICAL THERAPY | Facility: CLINIC | Age: 59
End: 2019-10-15
Payer: COMMERCIAL

## 2019-10-15 DIAGNOSIS — M75.111 INCOMPLETE TEAR OF RIGHT ROTATOR CUFF, UNSPECIFIED WHETHER TRAUMATIC: Primary | ICD-10-CM

## 2019-10-15 PROCEDURE — 97140 MANUAL THERAPY 1/> REGIONS: CPT

## 2019-10-15 PROCEDURE — 97110 THERAPEUTIC EXERCISES: CPT

## 2019-10-15 PROCEDURE — 97164 PT RE-EVAL EST PLAN CARE: CPT

## 2019-10-15 NOTE — LETTER
October 15, 2019    MD [facility] [address]    Patient: [facility]   YOB: 1960   Date of Visit: 10/15/2019     Encounter Diagnosis     ICD-10-CM    1  Incomplete tear of right rotator cuff, unspecified whether traumatic M75 111        Dear Dr Kait Campbell: Thank you for your recent referral of [facility]  Please review the attached evaluation summary from Farzad's recent visit  Please verify that you agree with the plan of care by signing the attached order  If you have any questions or concerns, please do not hesitate to call  I sincerely appreciate the opportunity to share in the care of one of your patients and hope to have another opportunity to work with you in the near future  Sincerely,    Gunjan Fountain, PT      Referring Provider:      I certify that I have read the below Plan of Care and certify the need for these services furnished under this plan of treatment while under my care  MD [address]ga [address], [address]: [phone]          PT RE-EVALUATION     Today's date: 10/15/2019  Patient name: [facility]  : 1960  MRN: [id number]  Referring provider: Temo Miranda MD  Dx:   Encounter Diagnosis     ICD-10-CM    1  Incomplete tear of right rotator cuff, unspecified whether traumatic M75 111                   Assessment  Assessment details: Pt presented s/p Right RTC repair on 19  Hx prior right shoulder surgery 7-8 years ago  Pt reports continued pain right shoulder  PT notes improved ROM and strength with pain limiting factor  He has been working as  and performing activities violating MD protocol leading to consistent reports of increased symptoms afterwards  PT has educated pt on this and advised against this  Hypersensitivity noted today in region of supraspinatus tendon    Pain continues to be limiting factor with progression in PT  Pt will benefit from continued PT services to reduce symptoms and restore normal functional level  Impairments: abnormal or restricted ROM, activity intolerance, impaired physical strength, lacks appropriate home exercise program and pain with function    Goals  ST  Decrease pain 50% 8 wk  2  Increase shoulder PROM to [facility] 8 wk  3  Increase shoulder strength to 3+/5 8 wk  Pt will report no difficulty with activity below shoulder level 8 wk  LT  Pt will report no pain 16 wk  2  Increase shoulder AROM to WNL 16 wk  3  Increase shoulder strength to WNL 16 wk  Pt will report no limitations with ADL's 16 wk    Plan  Patient would benefit from: PT eval  Planned modality interventions: cryotherapy, thermotherapy: hydrocollator packs and unattended electrical stimulation  Planned therapy interventions: joint mobilization, manual therapy, strengthening, stretching, therapeutic activities, therapeutic exercise, flexibility, functional ROM exercises and home exercise program  Frequency: 2x week  Duration in weeks: 16  Treatment plan discussed with: patient        Subjective Evaluation    History of Present Illness  Date of surgery: 2019  Mechanism of injury: Pt presents s/p right RTC repair on 19  Reports prior right shoulder surgery approx 7-8 years ago as well  Reports falling 2 years ago onto right arm/elbow and hearing "snap/pop"  Went to ER and reports he had fx  Reports pain and limited use of UE since that time  Functional level varied  RTC surgery advised to help with this  Reports continued pain since surgery  Reports intermittent numbness right hand and tingling sensation down RUE  Next MD follow up 19  Reports he is not using sling during the day or with sleep at night  Reports using RUE to assist lifting grandson and with ADL's in limited manner  Has returned to driving      Pain  Current pain rating: 3  At best pain rating: 3  At worst pain ratin  Location: Right shoulder  Quality: dull ache and sharp    Hand dominance: right  Exercise history: enjoys [employer]   Life stress: Works as [employer]  Has not been able to do this due to shoulder  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  Objective     Observations     Right Shoulder  Positive for atrophy and incision  Additional Observation Details  Atrophy noted right shoulder/UE  Old well healed incision noted AC joint region  Well healed incisions from current surgery     Tenderness     Right Shoulder  Tenderness in the biceps tendon (proximal), lateral scapula and supraspinatus tendon  Additional Tenderness Details  Hypersensitivity noted over supraspinatus tendon region    Active Range of Motion     Right Shoulder   Flexion: 140 degrees     Passive Range of Motion     Right Shoulder   Flexion: 156 degrees with pain  Abduction: 122 degrees with pain  External rotation 90°:  45 degrees with pain  Internal rotation 90°:  50 degrees with pain    Right Elbow   Normal passive range of motion    Strength/Myotome Testing     Additional Strength Details  Fair/Fair+ isometric strength  Pain noted with resisted ER                Precautions:  S/P Right RTC Tear    See Protocol       Manual  9-26-19 10-3-19 10-8-19 10-10-19 10-15-19   Right Shoulder PROM  15' 10' held 10'                                       Exercise Diary  9-26-19 10-3-19 10-8-19 10-10-19 10-15-19   Pendulums   30x 30x 30x    shrugs  30x 30x 30x 30x   retractions  30x 30x 30x 30x   Elbow flex/ext  30x 30x 30x 30x   gripper        pulleys  5'  flex 5' 5' 5'   Sub-max flex, ext, add, IR  15x  Ea  5" 20x  5" ea 20x  5"  ea 20x ea  5"   Wall slides  2x10  flex 30x 30x Flex  2x10   T-band Ext, Row, Add, IR  Green  20x ea Green  30x ea Green  30x Green  30x ea   Sidelying Abd/ER    ER 30x ER 30x  Abd 15x   Supine Cane Flexion     10x Supine Cane ER     10x                                                           HEP            Modalities 9-26-19 9-12-19 10-8-19 10-10-19 10-15-19   CP  10' 10' 10' 10'   CP with IFC 15'                 ** 10-15-19 Pt reports trying to hammer nails with RUE and cleaning boilers at work this past weekend with increased symptoms right shoulder afterwards

## 2019-10-15 NOTE — PROGRESS NOTES
PT RE-EVALUATION     Today's date: 10/15/2019  Patient name: Shreya [place]  : 1960  MRN: [id number]  Referring provider: Temo Miranda MD  Dx:   Encounter Diagnosis     ICD-10-CM    1  Incomplete tear of right rotator cuff, unspecified whether traumatic M75 111                   Assessment  Assessment details: Pt presented s/p Right RTC repair on 19  Hx prior right shoulder surgery 7-8 years ago  Pt reports continued pain right shoulder  PT notes improved ROM and strength with pain limiting factor  He has been working as  and performing activities violating MD protocol leading to consistent reports of increased symptoms afterwards  PT has educated pt on this and advised against this  Hypersensitivity noted today in region of supraspinatus tendon  Pain continues to be limiting factor with progression in PT  Pt will benefit from continued PT services to reduce symptoms and restore normal functional level  Impairments: abnormal or restricted ROM, activity intolerance, impaired physical strength, lacks appropriate home exercise program and pain with function    Goals  ST  Decrease pain 50% 8 wk  2  Increase shoulder PROM to [facility] 8 wk  3  Increase shoulder strength to 3+/5 8 wk  Pt will report no difficulty with activity below shoulder level 8 wk  LT  Pt will report no pain 16 wk  2  Increase shoulder AROM to WNL 16 wk  3  Increase shoulder strength to WNL 16 wk    Pt will report no limitations with ADL's 16 wk    Plan  Patient would benefit from: PT eval  Planned modality interventions: cryotherapy, thermotherapy: hydrocollator packs and unattended electrical stimulation  Planned therapy interventions: joint mobilization, manual therapy, strengthening, stretching, therapeutic activities, therapeutic exercise, flexibility, functional ROM exercises and home exercise program  Frequency: 2x week  Duration in weeks: 16  Treatment plan discussed with: patient        Subjective Evaluation    History of Present Illness  Date of surgery: 2019  Mechanism of injury: Pt presents s/p right RTC repair on 19  Reports prior right shoulder surgery approx 7-8 years ago as well  Reports falling 2 years ago onto right arm/elbow and hearing "snap/pop"  Went to ER and reports he had fx  Reports pain and limited use of UE since that time  Functional level varied  RTC surgery advised to help with this  Reports continued pain since surgery  Reports intermittent numbness right hand and tingling sensation down RUE  Next MD follow up 19  Reports he is not using sling during the day or with sleep at night  Reports using RUE to assist lifting grandson and with ADL's in limited manner  Has returned to driving  Pain  Current pain rating: 3  At best pain rating: 3  At worst pain ratin  Location: Right shoulder  Quality: dull ache and sharp    Hand dominance: right  Exercise history: enjoys [employer]   Life stress: Works as b[employer]zi  Has not been able to do this due to shoulder  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  Objective     Observations     Right Shoulder  Positive for atrophy and incision  Additional Observation Details  Atrophy noted right shoulder/UE  Old well healed incision noted AC joint region  Well healed incisions from current surgery     Tenderness     Right Shoulder  Tenderness in the biceps tendon (proximal), lateral scapula and supraspinatus tendon       Additional Tenderness Details  Hypersensitivity noted over supraspinatus tendon region    Active Range of Motion     Right Shoulder   Flexion: 140 degrees     Passive Range of Motion     Right Shoulder   Flexion: 156 degrees with pain  Abduction: 122 degrees with pain  External rotation 90°: 45 degrees with pain  Internal rotation 90°: 50 degrees with pain    Right Elbow   Normal passive range of motion    Strength/Myotome Testing     Additional Strength Details  Fair/Fair+ isometric strength  Pain noted with resisted ER                Precautions:  S/P Right RTC Tear  See Protocol       Manual  9-26-19 10-3-19 10-8-19 10-10-19 10-15-19   Right Shoulder PROM  15' 10' held 10'                                       Exercise Diary  9-26-19 10-3-19 10-8-19 10-10-19 10-15-19   Pendulums   30x 30x 30x    shrugs  30x 30x 30x 30x   retractions  30x 30x 30x 30x   Elbow flex/ext  30x 30x 30x 30x   gripper        pulleys  5'  flex 5' 5' 5'   Sub-max flex, ext, add, IR  15x  Ea  5" 20x  5" ea 20x  5"  ea 20x ea  5"   Wall slides  2x10  flex 30x 30x Flex  2x10   T-band Ext, Row, Add, IR  Green  20x ea Green  30x ea Green  30x Green  30x ea   Sidelying Abd/ER    ER 30x ER 30x  Abd 15x   Supine Cane Flexion     10x   Supine Cane ER     10x                                                           HEP            Modalities 9-26-19 9-12-19 10-8-19 10-10-19 10-15-19   CP  10' 10' 10' 10'   CP with IFC 15'                 ** 10-15-19 Pt reports trying to hammer nails with RUE and cleaning boilers at work this past weekend with increased symptoms right shoulder afterwards

## 2019-10-17 ENCOUNTER — OFFICE VISIT (OUTPATIENT)
Dept: PHYSICAL THERAPY | Facility: CLINIC | Age: 59
End: 2019-10-17
Payer: COMMERCIAL

## 2019-10-17 DIAGNOSIS — M75.111 INCOMPLETE TEAR OF RIGHT ROTATOR CUFF, UNSPECIFIED WHETHER TRAUMATIC: Primary | ICD-10-CM

## 2019-10-17 PROCEDURE — 97110 THERAPEUTIC EXERCISES: CPT

## 2019-10-17 PROCEDURE — 97140 MANUAL THERAPY 1/> REGIONS: CPT

## 2019-10-17 NOTE — PROGRESS NOTES
Daily Note     Today's date: 10/17/2019  Patient name: Abhilash Joshi  : 1960  MRN: [id number]  Referring provider: Jennifer Roldan MD  Dx:   Encounter Diagnosis     ICD-10-CM    1  Incomplete tear of right rotator cuff, unspecified whether traumatic M75 111                   Subjective: Its there  Its sore  Objective: See treatment diary below      Assessment: Tolerated treatment well  PT continues to note pain at end ranges of all movements  Improved PROM tolerance noted today  Fatigues easily with TE  Patient would benefit from continued PT      Plan: Continue per plan of care  Precautions:  S/P Right RTC Tear    See Protocol       Manual  10-17-19 10-3-19 10-8-19 10-10-19 10-15-19   Right Shoulder PROM ' 15' 10' held 10'                                       Exercise Diary  10-17-19 10-3-19 10-8-19 10-10-19 10-15-19   Pendulums   30x 30x 30x    shrugs 30x 30x 30x 30x 30x   retractions 30x 30x 30x 30x 30x   Elbow flex/ext 30x 30x 30x 30x 30x           pulleys 5' 5'  flex 5' 5' 5'   Sub-max flex, ext, add, IR 20x  5" 15x  Ea  5" 20x  5" ea 20x  5"  ea 20x ea  5"   Wall slides 30x 2x10  flex 30x 30x Flex  2x10   T-band Ext, Row, Add, IR Green  30x Green  20x ea Green  30x ea Green  30x Green  30x ea   Sidelying Abd/ER 20x ea   ER 30x ER 30x  Abd 15x   Supine Cane Flexion 20x  5"    10x   Supine Cane ER 20x  5"    10x                                                           HEP            Modalities 10-17-19 9-12-19 10-8-19 10-10-19 10-15-19   CP 10' 10' 10' 10' 10'   CP with IFC

## 2019-10-22 ENCOUNTER — TELEPHONE (OUTPATIENT)
Dept: NEUROLOGY | Facility: CLINIC | Age: 59
End: 2019-10-22

## 2019-10-22 NOTE — TELEPHONE ENCOUNTER
We need to have the MRI scan as there is an abnormality on the old MRI scan, you can connect me for peer to peer

## 2019-10-22 NOTE — TELEPHONE ENCOUNTER
Spoke with patient to advise that Rosa Kellogg has been denied for his MRI Cervical spine  Insurance requires 6 weeks of PT within 6 months  He is having PT at the moment but for a torn rotator cuff  He was fine with the cancellation  Dr Zen Jennings - please advise on the next step       Thank you

## 2019-10-23 ENCOUNTER — OFFICE VISIT (OUTPATIENT)
Dept: PHYSICAL THERAPY | Facility: CLINIC | Age: 59
End: 2019-10-23
Payer: COMMERCIAL

## 2019-10-23 DIAGNOSIS — M75.111 INCOMPLETE TEAR OF RIGHT ROTATOR CUFF, UNSPECIFIED WHETHER TRAUMATIC: Primary | ICD-10-CM

## 2019-10-23 PROCEDURE — 97110 THERAPEUTIC EXERCISES: CPT

## 2019-10-23 PROCEDURE — 97140 MANUAL THERAPY 1/> REGIONS: CPT

## 2019-10-23 NOTE — TELEPHONE ENCOUNTER
For Peer to Peer for patient's MRI Cervical Spine  Please call [phone] option 3  Tracking# [id number]  Please document the doctor's name you spoke to

## 2019-10-23 NOTE — PROGRESS NOTES
Daily Note     Today's date: 10/23/2019  Patient name: Alyson Stern  : 1960  MRN: [id number]  Referring provider: Erika Germain MD  Dx:   Encounter Diagnosis     ICD-10-CM    1  Incomplete tear of right rotator cuff, unspecified whether traumatic M75 111                   Subjective: The patient states that he is very very stiff today  Objective: See treatment diary below      Assessment: Tolerated treatment fair  Patient exhibited good technique with therapeutic exercises and would benefit from continued PT  Patient did muscle guard during manual therapy  Treatment modified today secondary to the patient having to leave early  Plan: Continue per plan of care  Precautions:  S/P Right RTC Tear    See Protocol       Manual  10-17-19 10-23-19 10-8-19 10-10-19 10-15-19   Right Shoulder PROM 12' 15' 10' held 10'                                       Exercise Diary  10-17-19 10-23-19 10-8-19 10-10-19 10-15-19   Pendulums    30x 30x    shrugs 30x  30x 30x 30x   retractions 30x  30x 30x 30x   Elbow flex/ext 30x  30x 30x 30x           pulleys 5' 5'  flex 5' 5' 5'   Sub-max flex, ext, add, IR 20x  5" 15x  Ea  5" 20x  5" ea 20x  5"  ea 20x ea  5"   Wall slides 30x 2x10  flex 30x 30x Flex  2x10   T-band Ext, Row, Add, IR Green  30x Green  20x ea Green  30x ea Green  30x Green  30x ea   Sidelying Abd/ER 20x ea   ER 30x ER 30x  Abd 15x   Supine Cane Flexion 20x  5" 20x 5"   10x   Supine Cane ER 20x  5" 20x 5"   10x                                                           HEP            Modalities 10-17-19 10-23-19 10-8-19 10-10-19 10-15-19   CP 10' Declined 10' 10' 10'   CP with IFC

## 2019-10-24 DIAGNOSIS — M50.00 HNP (HERNIATED NUCLEUS PULPOSUS) WITH MYELOPATHY, CERVICAL: Primary | ICD-10-CM

## 2019-10-24 NOTE — TELEPHONE ENCOUNTER
Discussed with the patient, please connect me peer to peer, also patient was advised to avoid chiropractic treatment and go for physical therapy and tell them to be very gentle  Please fax the script to [facility]

## 2019-10-25 ENCOUNTER — OFFICE VISIT (OUTPATIENT)
Dept: PHYSICAL THERAPY | Facility: CLINIC | Age: 59
End: 2019-10-25
Payer: COMMERCIAL

## 2019-10-25 DIAGNOSIS — M75.111 INCOMPLETE TEAR OF RIGHT ROTATOR CUFF, UNSPECIFIED WHETHER TRAUMATIC: Primary | ICD-10-CM

## 2019-10-25 PROCEDURE — 97140 MANUAL THERAPY 1/> REGIONS: CPT | Performed by: PHYSICAL THERAPIST

## 2019-10-25 PROCEDURE — 97110 THERAPEUTIC EXERCISES: CPT | Performed by: PHYSICAL THERAPIST

## 2019-10-25 NOTE — PROGRESS NOTES
Daily Note     Today's date: 10/25/2019  Patient name: Raz Navarro  : 1960  MRN: [id number]  Referring provider: Easton Emerson MD  Dx:   Encounter Diagnosis     ICD-10-CM    1  Incomplete tear of right rotator cuff, unspecified whether traumatic M75 111        Start Time: 9869  Stop Time: 1135  Total time in clinic (min): 50 minutes    Subjective: Pt reports "The shoulder is okay " No new complaints noted at this time  Objective: See treatment diary below      Assessment: Tolerated treatment well  Patient exhibited good technique with therapeutic exercises and would benefit from continued PT  Pt with good tolerance to exercises this visit  Cuing required t/o treatment for proper exercise technique  Continue to progress as tolerated per protocol  No adverse reaction noted to CP application  Plan: Continue per plan of care  Progress treatment as tolerated  Precautions:  S/P Right RTC Tear    See Protocol       Manual  10-17-19 10-23-19 10/25/19 10-10-19 10-15-19   Right Shoulder PROM 12' 15' 15 min  held 10'                                       Exercise Diary  10-17-19 10-23-19 10/25/19 10-10-19 10-15-19   Pendulums     30x    shrugs 30x  30x 30x 30x   retractions 30x  30x 30x 30x   Elbow flex/ext 30x  30x 30x 30x           pulleys 5' 5'  flex 5 min Flex  5' 5'   Sub-max flex, ext, add, IR 20x  5" 15x  Ea  5" 15x5" ea  20x  5"  ea 20x ea  5"   Wall slides 30x 2x10  flex 20x Flex  30x Flex  2x10   T-band Ext, Row, Add, IR Green  30x Green  20x ea 20x ea   Green  Green  30x Green  30x ea   Sidelying Abd/ER 20x ea   ER 30x ER 30x  Abd 15x   Supine Cane Flexion 20x  5" 20x 5" 20x5"  10x   Supine Cane ER 20x  5" 20x 5" 20x5"  10x                                                           HEP            Modalities 10-17-19 10-23-19 10/25/19 10-10-19 10-15-19   CP 10' Declined 5 min  10' 10'   CP with IFC

## 2019-10-29 ENCOUNTER — OFFICE VISIT (OUTPATIENT)
Dept: PHYSICAL THERAPY | Facility: CLINIC | Age: 59
End: 2019-10-29
Payer: COMMERCIAL

## 2019-10-29 DIAGNOSIS — M75.111 INCOMPLETE TEAR OF RIGHT ROTATOR CUFF, UNSPECIFIED WHETHER TRAUMATIC: Primary | ICD-10-CM

## 2019-10-29 PROCEDURE — 97110 THERAPEUTIC EXERCISES: CPT

## 2019-10-29 PROCEDURE — 97140 MANUAL THERAPY 1/> REGIONS: CPT

## 2019-10-29 NOTE — PROGRESS NOTES
Daily Note     Today's date: 10/29/2019  Patient name: Brendon Rocha  : 1960  MRN: [id number]  Referring provider: Lawyer Patricia MD  Dx:   Encounter Diagnosis     ICD-10-CM    1  Incomplete tear of right rotator cuff, unspecified whether traumatic M75 111                   Subjective:  I didn't do anything this weekend  The shoulder feels better      Objective: See treatment diary below      Assessment: Tolerated treatment well  Improved tolerance to tx noted today  Reports not working this past weekend with decreased symptoms however continues activities that overstress shoulder region such as hammering nails (increased pain)  Pain noted at end ranges with manual therapy  No symptoms noted with use of wt and t-band ER  Patient would benefit from continued PT      Plan: Continue per plan of care  Precautions:  S/P Right RTC Tear    See Protocol       Manual  10-17-19 10-23-19 10/25/19 10-29-19 10-15-19   Right Shoulder PROM 12' 15' 15 min  12' 10'                                       Exercise Diary  10-17-19 10-23-19 10/25/19 10-29-19 10-15-19   Pendulums         shrugs 30x  30x 30x  2# 30x   retractions 30x  30x 30x 30x   Bicep curl    30x  2# 30x           pulleys 5' 5'  flex 5 min Flex  5' 5'   Sub-max flex, ext, add, IR 20x  5" 15x  Ea  5" 15x5" ea  T-band ER yellow 20x 20x ea  5"   Wall slides 30x 2x10  flex 20x Flex  30x Flex  2x10   T-band Ext, Row, Add, IR Green  30x Green  20x ea 20x ea   Green  Green  30x Green  30x ea   Sidelying Abd/ER 20x ea   30x  ea ER 30x  Abd 15x   Supine Cane Flexion 20x  5" 20x 5" 20x5"  10x   Supine Cane ER 20x  5" 20x 5" 20x5"  10x                                                           HEP            Modalities 10-17-19 10-23-19 10/25/19 10-10-19 10-15-19   CP 10' Declined 5 min  10' 10'   CP with IFC

## 2019-10-31 ENCOUNTER — OFFICE VISIT (OUTPATIENT)
Dept: PHYSICAL THERAPY | Facility: CLINIC | Age: 59
End: 2019-10-31
Payer: COMMERCIAL

## 2019-10-31 DIAGNOSIS — M75.111 INCOMPLETE TEAR OF RIGHT ROTATOR CUFF, UNSPECIFIED WHETHER TRAUMATIC: Primary | ICD-10-CM

## 2019-10-31 PROCEDURE — 97110 THERAPEUTIC EXERCISES: CPT

## 2019-10-31 PROCEDURE — 97140 MANUAL THERAPY 1/> REGIONS: CPT

## 2019-10-31 NOTE — PROGRESS NOTES
Daily Note     Today's date: 10/31/2019  Patient name: Oscar Low  : 1960  MRN: [id number]  Referring provider: Silvia Velez MD  Dx:   Encounter Diagnosis     ICD-10-CM    1  Incomplete tear of right rotator cuff, unspecified whether traumatic M75 111                   Subjective:  My shoulder is a little sore and tight today  I think its the weather      Objective: See treatment diary below      Assessment: Tolerated treatment well  Reports increased pain in c-spine with TE and manual therapy of right shoulder  Reports pain originating c-spine and radiating to RUE  Reports seeing ortho MD for right elbow and diagnosed with bursitis  Patient would benefit from continued PT      Plan: Continue per plan of care  Precautions:  S/P Right RTC Tear    See Protocol       Manual  10-17-19 10-23-19 10/25/19 10-29-19 10-31-19   Right Shoulder PROM 12' 15' 15 min  12' 10'                                       Exercise Diary  10-17-19 10-23-19 10/25/19 10-29-19 10-31-19   Pendulums         shrugs 30x  30x 30x  2# 30x  2#   retractions 30x  30x 30x 30x   Bicep curl    30x  2# 30x  2#           pulleys 5' 5'  flex 5 min Flex  5' 5'   Sub-max flex, ext, add, IR 20x  5" 15x  Ea  5" 15x5" ea  T-band ER yellow 20x T-band ER yellow 30x   Wall slides 30x 2x10  flex 20x Flex  30x Flex  5x   T-band Ext, Row, Add, IR Green  30x Green  20x ea 20x ea   Green  Green  30x Green  30x ea   Sidelying Abd/ER 20x ea   30x  ea    Supine Cane Flexion 20x  5" 20x 5" 20x5"     Supine Cane ER 20x  5" 20x 5" 20x5"     UBE     Fwd 90 rpm 4'                                                   HEP            Modalities 10-17-19 10-23-19 10/25/19 10-10-19 10-31-19   CP 10' Declined 5 min  10' 10'   CP with IFC

## 2019-11-01 ENCOUNTER — APPOINTMENT (OUTPATIENT)
Dept: PHYSICAL THERAPY | Facility: CLINIC | Age: 59
End: 2019-11-01
Payer: COMMERCIAL

## 2019-11-05 ENCOUNTER — APPOINTMENT (OUTPATIENT)
Dept: PHYSICAL THERAPY | Facility: CLINIC | Age: 59
End: 2019-11-05
Payer: COMMERCIAL

## 2019-11-06 ENCOUNTER — OFFICE VISIT (OUTPATIENT)
Dept: PHYSICAL THERAPY | Facility: CLINIC | Age: 59
End: 2019-11-06
Payer: COMMERCIAL

## 2019-11-06 DIAGNOSIS — M75.111 INCOMPLETE TEAR OF RIGHT ROTATOR CUFF, UNSPECIFIED WHETHER TRAUMATIC: Primary | ICD-10-CM

## 2019-11-06 PROCEDURE — 97140 MANUAL THERAPY 1/> REGIONS: CPT

## 2019-11-06 PROCEDURE — 97110 THERAPEUTIC EXERCISES: CPT

## 2019-11-06 NOTE — PROGRESS NOTES
Daily Note     Today's date: 2019  Patient name: Bere Pham  : 1960  MRN: [id number]  Referring provider: Holly Moore MD  Dx:   Encounter Diagnosis     ICD-10-CM    1  Incomplete tear of right rotator cuff, unspecified whether traumatic M75 111                   Subjective: The shoulder is feeling ok today  I didn't do much this weekend I've been taking 800 Ibuprofen twice a day      Objective: See treatment diary below      Assessment: Tolerated treatment well  New self stretches added to help with end range motions  MM tightness noted  Reports continued pain radiating from c-spine  Patient would benefit from continued PT      Plan: Continue per plan of care  Precautions:  S/P Right RTC Tear    See Protocol       Manual  11-6-19 10-23-19 10/25/19 10-29-19 10-31-19   Right Shoulder PROM 10' 15' 15 min  12' 10'                                       Exercise Diary  11-6-19 10-23-19 10/25/19 10-29-19 10-31-19   Pendulums         shrugs 15x  3#  30x 30x  2# 30x  2#   retractions   30x 30x 30x   Bicep curl 30x  3#   30x  2# 30x  2#   Bent over row 3#  30x       pulleys 5' 5'  flex 5 min Flex  5' 5'   Sub-max flex, ext, add, IR  15x  Ea  5" 15x5" ea  T-band ER yellow 20x T-band ER yellow 30x   Wall slides 30x 2x10  flex 20x Flex  30x Flex  5x   T-band Ext, Row, Add, IR/ER Green  30x ea       Sidelying Abd/ER    30x  ea    Supine Cane Flexion  20x 5" 20x5"     Supine Cane ER  20x 5" 20x5"     UBE 6' Fwd  90 rpm    Fwd 90 rpm 4'   Towel IR stretch 15x  5"       Doorway ER stretch 15x  5"                                       HEP            Modalities 11-6-19 10-23-19 10/25/19 10-10-19 10-31-19   CP 10' Declined 5 min  10' 10'   CP with IFC

## 2019-11-07 ENCOUNTER — OFFICE VISIT (OUTPATIENT)
Dept: PHYSICAL THERAPY | Facility: CLINIC | Age: 59
End: 2019-11-07
Payer: COMMERCIAL

## 2019-11-07 DIAGNOSIS — M75.111 INCOMPLETE TEAR OF RIGHT ROTATOR CUFF, UNSPECIFIED WHETHER TRAUMATIC: Primary | ICD-10-CM

## 2019-11-07 PROCEDURE — 97140 MANUAL THERAPY 1/> REGIONS: CPT

## 2019-11-07 PROCEDURE — 97110 THERAPEUTIC EXERCISES: CPT

## 2019-11-07 NOTE — PROGRESS NOTES
Daily Note     Today's date: 2019  Patient name: Jessica Danielle  : 1960  MRN: [id number]  Referring provider: Symone Garcia MD  Dx:   Encounter Diagnosis     ICD-10-CM    1  Incomplete tear of right rotator cuff, unspecified whether traumatic M75 111                   Subjective: The shoulder is sore today  Objective: See treatment diary below      Assessment: Tolerated treatment well  Tx modified due to reports of shoulder pain/soreness  Improving ROM noted  Patient would benefit from continued PT      Plan: Continue per plan of care  Precautions:  S/P Right RTC Tear    See Protocol       Manual  11-6-19 11-7-19 10/25/19 10-29-19 10-31-19   Right Shoulder PROM 10' 10' 15 min  12' 10'                                       Exercise Diary  11-6-19 11-7-19 10/25/19 10-29-19 10-31-19   Pendulums         shrugs 15x  3#  30x 30x  2# 30x  2#   retractions   30x 30x 30x   Bicep curl 30x  3#   30x  2# 30x  2#   Bent over row 3#  30x       pulleys 5' 5'  flex 5 min Flex  5' 5'   Sub-max flex, ext, add, IR   15x5" ea  T-band ER yellow 20x T-band ER yellow 30x   Wall slides 30x 30x  flex 20x Flex  30x Flex  5x   T-band Ext, Row, Add, IR/ER Green  30x ea Green  30x      Sidelying Abd/ER  ER  20x  30x  ea    Supine Cane Flexion  10x 5" 20x5"     Supine Cane ER   20x5"     UBE 6' Fwd  90 rpm    Fwd 90 rpm 4'   Towel IR stretch 15x  5" 15x  5"      Doorway ER stretch 15x  5" 15x  5"                                      HEP            Modalities 11-6-19 11-7-19 10/25/19 10-10-19 10-31-19   CP 10' 10x 5 min  10' 10'   CP with IFC

## 2019-11-12 ENCOUNTER — APPOINTMENT (OUTPATIENT)
Dept: PHYSICAL THERAPY | Facility: CLINIC | Age: 59
End: 2019-11-12
Payer: COMMERCIAL

## 2019-11-13 ENCOUNTER — OFFICE VISIT (OUTPATIENT)
Dept: PHYSICAL THERAPY | Facility: CLINIC | Age: 59
End: 2019-11-13
Payer: COMMERCIAL

## 2019-11-13 DIAGNOSIS — M75.111 INCOMPLETE TEAR OF RIGHT ROTATOR CUFF, UNSPECIFIED WHETHER TRAUMATIC: Primary | ICD-10-CM

## 2019-11-13 PROCEDURE — 97110 THERAPEUTIC EXERCISES: CPT

## 2019-11-13 PROCEDURE — 97140 MANUAL THERAPY 1/> REGIONS: CPT

## 2019-11-13 NOTE — PROGRESS NOTES
Daily Note     Today's date: 2019  Patient name: Alyson Stern  : 1960  MRN: [id number]  Referring provider: Erika Germain MD  Dx:   Encounter Diagnosis     ICD-10-CM    1  Incomplete tear of right rotator cuff, unspecified whether traumatic M75 111                   Subjective: The arm is sore today  Reports working the past 5 days  I tried lifting my grandson up today and I couldn't because the arm hurt to much  Objective: See treatment diary below      Assessment: Tolerated treatment well  Reports moving washing machine and working on oil heater yesterday  Limited tolerance to Tx due to reports of pain  To see MD next week for follow up  Continue to caution on activity outside PT  Patient would benefit from continued PT      Plan: Continue per plan of care  Precautions:  S/P Right RTC Tear    See Protocol       Manual  11-6-19 11-7-19 11/13/19 10-29-19 10-31-19   Right Shoulder PROM 10' 10' 10' 12' 10'                                       Exercise Diary  11-6-19 11-7-19 11/13/19 10-29-19 10-31-19   Pendulums         shrugs 15x  3#  30x  2# 30x  2# 30x  2#   retractions    30x 30x   Bicep curl 30x  3#  30x  2#  30x  2# 30x  2#   Bent over row 3#  30x  30x  2#     pulleys 5' 5'  flex 5 min Flex  5' 5'   Sub-max flex, ext, add, IR    T-band ER yellow 20x T-band ER yellow 30x   Wall slides 30x 30x  flex 30x Flex  30x Flex  5x   T-band Ext, Row, Add, IR/ER Green  30x ea Green  30x Green 30x ea     Sidelying Abd/ER  ER  20x ER  20x  Abd  10x 30x  ea    Supine Cane Flexion  10x 5"      Supine Cane ER        UBE 6' Fwd  90 rpm  4'  Alt  80 rpm  Fwd 90 rpm 4'   Towel IR stretch 15x  5" 15x  5"      Doorway ER stretch 15x  5" 15x  5"                                      HEP            Modalities 11-6-19 11-7-19 11/13/19 10-10-19 10-31-19   CP 10' 10x 10 min  10' 10'   CP with IFC

## 2019-11-14 ENCOUNTER — APPOINTMENT (OUTPATIENT)
Dept: PHYSICAL THERAPY | Facility: CLINIC | Age: 59
End: 2019-11-14
Payer: COMMERCIAL

## 2019-11-20 ENCOUNTER — TRANSCRIBE ORDERS (OUTPATIENT)
Dept: PHYSICAL THERAPY | Facility: CLINIC | Age: 59
End: 2019-11-20

## 2019-11-20 ENCOUNTER — EVALUATION (OUTPATIENT)
Dept: PHYSICAL THERAPY | Facility: CLINIC | Age: 59
End: 2019-11-20
Payer: COMMERCIAL

## 2019-11-20 DIAGNOSIS — M75.111 INCOMPLETE TEAR OF RIGHT ROTATOR CUFF, UNSPECIFIED WHETHER TRAUMATIC: Primary | ICD-10-CM

## 2019-11-20 PROCEDURE — 97164 PT RE-EVAL EST PLAN CARE: CPT

## 2019-11-20 PROCEDURE — 97110 THERAPEUTIC EXERCISES: CPT

## 2019-11-20 PROCEDURE — 97140 MANUAL THERAPY 1/> REGIONS: CPT

## 2019-11-20 NOTE — PROGRESS NOTES
PT RE-EVALUATION     Today's date: 2019  Patient name: Erica Haas  : 1960  MRN: [id number]  Referring provider: Kim Mitchell MD  Dx:   Encounter Diagnosis     ICD-10-CM    1  Incomplete tear of right rotator cuff, unspecified whether traumatic M75 111                   Assessment  Assessment details: Pt presented s/p Right RTC repair on 19  Pt reports continued pain right shoulder  PT notes decreased ROM since last re-evaluation but improved strength  Pain limiting factor with AROM/PROM  He continues working as  and performing activities violating MD protocol leading to consistent reports of increased symptoms afterwards  PT has educated pt on this and advised against this  Reports sharp pain right shoulder with all end range movements  Fatigues quickly with exercises at PT  He reports dysfunction with c-spine with symptoms radiating to RUE  Reports this is contributing to shoulder pain as well  Unable to fully extend right elbow likely due to swollen bursa  Is seeing Ortho MD for elbow and c-spine as well  Pt will benefit from continued PT tx to decrease symptoms and restore functional level  Impairments: abnormal or restricted ROM, activity intolerance, impaired physical strength, lacks appropriate home exercise program and pain with function    Goals  ST  Decrease pain 50% 8 wk  2  Increase shoulder PROM to [facility] 8 wk  3  Increase shoulder strength to 3+/5 8 wk  Pt will report no difficulty with activity below shoulder level 8 wk  LT  Pt will report no pain 16 wk  2  Increase shoulder AROM to WNL 16 wk  3  Increase shoulder strength to WNL 16 wk    Pt will report no limitations with ADL's 16 wk    Plan  Patient would benefit from: PT eval  Planned modality interventions: cryotherapy, thermotherapy: hydrocollator packs and unattended electrical stimulation  Planned therapy interventions: joint mobilization, manual therapy, strengthening, stretching, therapeutic activities, therapeutic exercise, flexibility, functional ROM exercises and home exercise program  Frequency: 2x week  Duration in weeks: 16  Treatment plan discussed with: patient        Subjective Evaluation    History of Present Illness  Date of surgery: 2019  Mechanism of injury: Pt presents s/p right RTC repair on 19  Reports prior right shoulder surgery approx 7-8 years ago as well  Reports falling 2 years ago onto right arm/elbow and hearing "snap/pop"  Went to ER and reports he had fx  Reports pain and limited use of UE since that time  Functional level varied  RTC surgery advised to help with this  Reports continued pain since surgery  Reports intermittent numbness right hand and tingling sensation down RUE  Next MD follow up 19  Reports he is not using sling during the day or with sleep at night  Reports using RUE to assist lifting grandson and with ADL's in limited manner  Has returned to driving  Pain  Current pain ratin  At best pain ratin  At worst pain rating: 10  Location: Right shoulder  Quality: dull ache, sharp, radiating, throbbing and burning    Hand dominance: right  Exercise history: enjoys [employer]   Life stress: Works as [employer]  Has not been able to do this due to shoulder  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  Objective     Observations     Right Shoulder  Positive for atrophy and incision  Additional Observation Details  Atrophy noted right shoulder/UE      Tenderness     Right Shoulder  Tenderness in the biceps tendon (proximal) and supraspinatus tendon       Additional Tenderness Details  Tenderness to palpation mid-deltoid and proximal bicep regions    Active Range of Motion     Right Shoulder   Flexion: 112 degrees with pain  Abduction: 100 degrees with pain  External rotation BTH: C2 with pain  Internal rotation BTB: L5 with pain    Additional Active Range of Motion Details  Sharp pain at end ranges     Passive Range of Motion     Right Shoulder   Flexion: 145 degrees with pain  Abduction: 115 degrees with pain  External rotation 90°: 55 degrees with pain  Internal rotation 90°: 70 degrees with pain    Right Elbow   Normal passive range of motion    Additional Passive Range of Motion Details  Sharp pain at end ranges    Strength/Myotome Testing     Left Shoulder   Normal muscle strength    Right Shoulder     Planes of Motion   Flexion: 4-   Abduction: 4-   External rotation at 0°: 3+   Internal rotation at 0°: 4-     Isolated Muscles   Biceps: 4-   Triceps: 3+     Additional Strength Details  Strength in available ROM  Pain noted with resisted ER                Precautions:  S/P Right RTC Tear    See Protocol       Manual  11-6-19 11-7-19 11/13/19 11-20-19 10-31-19   Right Shoulder PROM 10' 10' 10' 10' 10'                                       Exercise Diary  11-6-19 11-7-19 11/13/19 11-20-19 10-31-19   Pendulums         shrugs 15x  3#  30x  2#  30x  2#   retractions     30x   Bicep curl 30x  3#  30x  2#   30x  2#   Bent over row 3#  30x  30x  2#     pulleys 5' 5'  flex 5 min Flex  5' 5'   Sub-max flex, ext, add, IR     T-band ER yellow 30x   Wall slides 30x 30x  flex 30x Flex  20x Flex  5x   T-band Ext, Row, Add, IR/ER Green  30x ea Green  30x Green 30x ea Green  30x ea    Sidelying Abd/ER  ER  20x ER  20x  Abd  10x ER 30x  1#    Supine Cane Flexion  10x 5"      Supine Cane ER    T-band punches green 30x    UBE 6' Fwd  90 rpm  4'  Alt  80 rpm  Fwd 90 rpm 4'   Towel IR stretch 15x  5" 15x  5"      Doorway ER stretch 15x  5" 15x  5"                                      HEP    Updated HEP and issued green t-band        Modalities 11-6-19 11-7-19 11/13/19 11-20-19 10-31-19   CP 10' 10x 10 min  10' 10'   CP with IFC

## 2019-11-20 NOTE — LETTER
2019    MD [address], [place]    Patient: Yenifer Harmon   YOB: 1960   Date of Visit: 2019     Encounter Diagnosis     ICD-10-CM    1  Incomplete tear of right rotator cuff, unspecified whether traumatic M75 111        Dear Dr Cara Penny: Thank you for your recent referral of Yenifer Harmon  Please review the attached evaluation summary from Farzad's recent visit  Please verify that you agree with the plan of care by signing the attached order  If you have any questions or concerns, please do not hesitate to call  I sincerely appreciate the opportunity to share in the care of one of your patients and hope to have another opportunity to work with you in the near future  Sincerely,    Aden Watson, PT      Referring Provider:      I certify that I have read the below Plan of Care and certify the need for these services furnished under this plan of treatment while under my care  MD [address], [address]: [phone]          PT RE-EVALUATION     Today's date: 2019  Patient name: Yenifer Harmon  : 1960  MRN: [id number]  Referring provider: Cali Jackson MD  Dx:   Encounter Diagnosis     ICD-10-CM    1  Incomplete tear of right rotator cuff, unspecified whether traumatic M75 111                   Assessment  Assessment details: Pt presented s/p Right RTC repair on 19  Pt reports continued pain right shoulder  PT notes decreased ROM since last re-evaluation but improved strength  Pain limiting factor with AROM/PROM  He continues working as  and performing activities violating MD protocol leading to consistent reports of increased symptoms afterwards  PT has educated pt on this and advised against this  Reports sharp pain right shoulder with all end range movements    Fatigues quickly with exercises at PT  He reports dysfunction with c-spine with symptoms radiating to RUE  Reports this is contributing to shoulder pain as well  Unable to fully extend right elbow likely due to swollen bursa  Is seeing Ortho MD for elbow and c-spine as well  Pt will benefit from continued PT tx to decrease symptoms and restore functional level  Impairments: abnormal or restricted ROM, activity intolerance, impaired physical strength, lacks appropriate home exercise program and pain with function    Goals  ST  Decrease pain 50% 8 wk  2  Increase shoulder PROM to [facility] 8 wk  3  Increase shoulder strength to 3+/5 8 wk  Pt will report no difficulty with activity below shoulder level 8 wk  LT  Pt will report no pain 16 wk  2  Increase shoulder AROM to WNL 16 wk  3  Increase shoulder strength to WNL 16 wk  Pt will report no limitations with ADL's 16 wk    Plan  Patient would benefit from: PT eval  Planned modality interventions: cryotherapy, thermotherapy: hydrocollator packs and unattended electrical stimulation  Planned therapy interventions: joint mobilization, manual therapy, strengthening, stretching, therapeutic activities, therapeutic exercise, flexibility, functional ROM exercises and home exercise program  Frequency: 2x week  Duration in weeks: 16  Treatment plan discussed with: patient        Subjective Evaluation    History of Present Illness  Date of surgery: 2019  Mechanism of injury: Pt presents s/p right RTC repair on 19  Reports prior right shoulder surgery approx 7-8 years ago as well  Reports falling 2 years ago onto right arm/elbow and hearing "snap/pop"  Went to ER and reports he had fx  Reports pain and limited use of UE since that time  Functional level varied  RTC surgery advised to help with this  Reports continued pain since surgery  Reports intermittent numbness right hand and tingling sensation down RUE  Next MD follow up 19    Reports he is not using sling during the day or with sleep at night  Reports using RUE to assist lifting grandson and with ADL's in limited manner  Has returned to driving  Pain  Current pain ratin  At best pain ratin  At worst pain rating: 10  Location: Right shoulder  Quality: dull ache, sharp, radiating, throbbing and burning    Hand dominance: right  Exercise history: enjoys [employer]   Life stress: Works as b[employer]zi  Has not been able to do this due to shoulder  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  Objective     Observations     Right Shoulder  Positive for atrophy and incision  Additional Observation Details  Atrophy noted right shoulder/UE      Tenderness     Right Shoulder  Tenderness in the biceps tendon (proximal) and supraspinatus tendon  Additional Tenderness Details  Tenderness to palpation mid-deltoid and proximal bicep regions    Active Range of Motion     Right Shoulder   Flexion: 112 degrees with pain  Abduction: 100 degrees with pain  External rotation BTH: C2 with pain  Internal rotation BTB: L5 with pain    Additional Active Range of Motion Details  Sharp pain at end ranges     Passive Range of Motion     Right Shoulder   Flexion: 145 degrees with pain  Abduction: 115 degrees with pain  External rotation 90°:  55 degrees with pain  Internal rotation 90°:  70 degrees with pain    Right Elbow   Normal passive range of motion    Additional Passive Range of Motion Details  Sharp pain at end ranges    Strength/Myotome Testing     Left Shoulder   Normal muscle strength    Right Shoulder     Planes of Motion   Flexion: 4-   Abduction: 4-   External rotation at 0°:  3+   Internal rotation at 0°:  4-     Isolated Muscles   Biceps: 4-   Triceps: 3+     Additional Strength Details  Strength in available ROM  Pain noted with resisted ER                Precautions:  S/P Right RTC Tear    See Protocol       Manual  11-6-19 11-7-19 11/13/19 11-20-19 10-31-19   Right Shoulder PROM 10' 10' 10' 10' 10'                                       Exercise Diary  11-6-19 11-7-19 11/13/19 11-20-19 10-31-19   Pendulums         shrugs 15x  3#  30x  2#  30x  2#   retractions     30x   Bicep curl 30x  3#  30x  2#   30x  2#   Bent over row 3#  30x  30x  2#     pulleys 5' 5'  flex 5 min Flex  5' 5'   Sub-max flex, ext, add, IR     T-band ER yellow 30x   Wall slides 30x 30x  flex 30x Flex  20x Flex  5x   T-band Ext, Row, Add, IR/ER Green  30x ea Green  30x Green 30x ea Green  30x ea    Sidelying Abd/ER  ER  20x ER  20x  Abd  10x ER 30x  1#    Supine Cane Flexion  10x 5"      Supine Cane ER    T-band punches green 30x    UBE 6' Fwd  90 rpm  4'  Alt  80 rpm  Fwd 90 rpm 4'   Towel IR stretch 15x  5" 15x  5"      Doorway ER stretch 15x  5" 15x  5"                                      HEP    Updated HEP and issued green t-band        Modalities 11-6-19 11-7-19 11/13/19 11-20-19 10-31-19   CP 10' 10x 10 min  10' 10'   CP with IFC

## 2019-11-25 ENCOUNTER — TRANSCRIBE ORDERS (OUTPATIENT)
Dept: PHYSICAL THERAPY | Facility: CLINIC | Age: 59
End: 2019-11-25

## 2019-11-25 DIAGNOSIS — M75.111 INCOMPLETE TEAR OF RIGHT ROTATOR CUFF, UNSPECIFIED WHETHER TRAUMATIC: Primary | ICD-10-CM

## 2019-12-05 ENCOUNTER — OFFICE VISIT (OUTPATIENT)
Dept: PHYSICAL THERAPY | Facility: CLINIC | Age: 59
End: 2019-12-05
Payer: COMMERCIAL

## 2019-12-05 DIAGNOSIS — M75.111 INCOMPLETE TEAR OF RIGHT ROTATOR CUFF, UNSPECIFIED WHETHER TRAUMATIC: Primary | ICD-10-CM

## 2019-12-05 PROCEDURE — 97110 THERAPEUTIC EXERCISES: CPT | Performed by: PHYSICAL THERAPIST

## 2019-12-05 PROCEDURE — 97140 MANUAL THERAPY 1/> REGIONS: CPT | Performed by: PHYSICAL THERAPIST

## 2019-12-05 NOTE — PROGRESS NOTES
Daily Note     Today's date: 2019  Patient name: Vickie Cadet  : 1960  MRN: [id number]  Referring provider: Dalia Kaufman MD  Dx:   Encounter Diagnosis     ICD-10-CM    1  Incomplete tear of right rotator cuff, unspecified whether traumatic M75 111        Start Time: 1400  Stop Time: 1500  Total time in clinic (min): 60 minutes    Subjective: Pt reports "Today is the best it's felt since the surgery "       Objective: See treatment diary below      Assessment: Tolerated treatment well  Patient would benefit from continued PT  Pt with good tolerance to exercises this visit  Cuing required t/o tx for proper exercise technique  No increased pain reported t/o treatment  Continue to progress as tolerated by the patient per protocol  No adverse reaction noted to CP application  Plan: Continue per plan of care  Progress treatment as tolerated  Precautions:  S/P Right RTC Tear    See Protocol        Manual  19   Right Shoulder PROM 10' 10' 10' 10' 15 min                       Exercise Diary  19   Pendulums             shrugs 15x  3#   30x  2#      retractions            Bicep curl 30x  3#   30x  2#       Bent over row 3#  30x   30x  2#      pulleys 5' 5'  flex 5 min Flex  5' 5 min flex    Sub-max flex, ext, add, IR            Wall slides 30x 30x  flex 30x Flex  20x 30x Flex    T-band Ext, Row, Add, IR/ER Green  30x ea Green  30x Green 30x ea Green  30x ea 30x ea Green    Sidelying Abd/ER   ER  20x ER  20x  Abd  10x ER 30x  1# ER 30x 2#   Supine Cane Flexion   10x 5"        Supine Cane ER       T-band punches green 30x TB Punches  30x Green    UBE 6' Fwd  90 rpm   4'  Alt  80 rpm      Towel IR stretch 15x  5" 15x  5"        Doorway ER stretch 15x  5" 15x  5"                                                            HEP       Updated HEP and issued green t-band          Modalities 19 11-20-19 12/5/19   CP 10' 10x 10 min  10' 5 min    CP with IFC

## 2019-12-11 ENCOUNTER — OFFICE VISIT (OUTPATIENT)
Dept: URGENT CARE | Facility: CLINIC | Age: 59
End: 2019-12-11
Payer: COMMERCIAL

## 2019-12-11 VITALS
TEMPERATURE: 98.7 F | SYSTOLIC BLOOD PRESSURE: 174 MMHG | BODY MASS INDEX: 31.91 KG/M2 | WEIGHT: 222.4 LBS | RESPIRATION RATE: 18 BRPM | DIASTOLIC BLOOD PRESSURE: 91 MMHG | HEART RATE: 75 BPM | OXYGEN SATURATION: 97 %

## 2019-12-11 DIAGNOSIS — H92.01 RIGHT EAR PAIN: Primary | ICD-10-CM

## 2019-12-11 PROCEDURE — 99203 OFFICE O/P NEW LOW 30 MIN: CPT | Performed by: PHYSICIAN ASSISTANT

## 2019-12-11 PROCEDURE — 69200 CLEAR OUTER EAR CANAL: CPT | Performed by: PHYSICIAN ASSISTANT

## 2019-12-11 PROCEDURE — G0382 LEV 3 HOSP TYPE B ED VISIT: HCPCS | Performed by: PHYSICIAN ASSISTANT

## 2019-12-11 PROCEDURE — 99283 EMERGENCY DEPT VISIT LOW MDM: CPT | Performed by: PHYSICIAN ASSISTANT

## 2019-12-11 RX ORDER — OFLOXACIN 3 MG/ML
10 SOLUTION AURICULAR (OTIC) DAILY
Qty: 5 ML | Refills: 0 | Status: SHIPPED | OUTPATIENT
Start: 2019-12-11 | End: 2019-12-18

## 2019-12-11 NOTE — PROGRESS NOTES
[facility]        NAME: Scar Martinez is a 63 y o  male  : 1960    MRN: [id number]  DATE: 2019  TIME: 2:26 PM    Assessment and Plan   Right ear pain [H92 01]  1  Right ear pain  ofloxacin (FLOXIN) 0 3 % otic solution     Foreign body removal  Date/Time: 2019 2:20 PM  Performed by: Kaycee Barton PA-C  Authorized by: Kaycee Barton PA-C   Universal Protocol:Consent given by: patient  Patient identity confirmed: verbally with patient    Body area: ear  Localization method: visualized  Removal mechanism: irrigation  Complexity: simple  Patient tolerance: Patient tolerated the procedure well with no immediate complications  Comments: Cerumen removed entirely from both right and left inner ear        Patient Instructions     Follow up with PCP in 3-5 days  Proceed to  ER if symptoms worsen  Chief Complaint     Chief Complaint   Patient presents with   Ana Bitters     c/o of right ear pain for a wee  History of Present Illness       60-year-old male presents for right ear pain and decreased hearing  States this has been ongoing for several years  Patient has had that his ears cleaned in the past due to the wax buildup  He states he has not taken any over-the-counter medications for this  He denies any trauma to the ear  Denies any fever chills  Review of Systems   Review of Systems   Constitutional: Negative for chills, fatigue and fever  HENT: Positive for ear pain  Negative for congestion, ear discharge, sinus pain, sore throat and trouble swallowing  Eyes: Negative for pain, discharge and redness  Respiratory: Negative for cough, chest tightness, shortness of breath and wheezing  Cardiovascular: Negative for chest pain, palpitations and leg swelling  Gastrointestinal: Negative for abdominal pain, diarrhea, nausea and vomiting  Musculoskeletal: Negative for arthralgias, joint swelling and myalgias     Skin: Negative for rash    Neurological: Negative for dizziness, weakness, numbness and headaches           Current Medications       Current Outpatient Medications:     albuterol (PROVENTIL HFA,VENTOLIN HFA) 90 mcg/act inhaler, Inhale 2 puffs every 6 (six) hours as needed, Disp: , Rfl:     amLODIPine (NORVASC) 10 mg tablet, Take 1 tablet by mouth Daily, Disp: , Rfl:     atorvastatin (LIPITOR) 40 mg tablet, TAKE 1 TABLET BY MOUTH NIGHTLY, Disp: , Rfl:     benazepril (LOTENSIN) 40 MG tablet, Take 1 tablet by mouth daily, Disp: , Rfl:     Ertugliflozin-metFORMIN HCl 2 5-1000 MG TABS, Take 2 5 mg by mouth 2 (two) times a day, Disp: , Rfl:     gabapentin (NEURONTIN) 100 mg capsule, Take 1 capsule (100 mg total) by mouth daily at bedtime, Disp: 30 capsule, Rfl: 1    glipiZIDE (GLUCOTROL) 10 mg tablet, Take 10 mg by mouth 2 (two) times a day before meals, Disp: , Rfl:     HYDROcodone-acetaminophen (NORCO) 5-325 mg per tablet, Take 1 tablet by mouth every 6 (six) hours as needed for pain Max Daily Amount: 4 tablets, Disp: 20 tablet, Rfl: 0    ibuprofen (MOTRIN) 800 mg tablet, Take 1 tablet by mouth 3 (three) times a day, Disp: , Rfl:     metFORMIN (GLUCOPHAGE) 1000 MG tablet, Take 1,000 mg by mouth 2 (two) times a day, Disp: , Rfl:     ofloxacin (FLOXIN) 0 3 % otic solution, Administer 10 drops into both ears daily for 7 days, Disp: 5 mL, Rfl: 0    Current Allergies     Allergies as of 12/11/2019 - Reviewed 12/11/2019   Allergen Reaction Noted    Pravastatin GI Intolerance 09/23/2015            The following portions of the patient's history were reviewed and updated as appropriate: allergies, current medications, past family history, past medical history, past social history, past surgical history and problem list      Past Medical History:   Diagnosis Date    Diabetes mellitus ([address] )     Hyperlipidemia     Hypertension        Past Surgical History:   Procedure Laterality Date    CARPAL TUNNEL RELEASE      CYST REMOVAL      EYE SURGERY      LEG SURGERY      shrapnel removal    ROTATOR CUFF REPAIR Right     ULNAR NERVE TRANSPOSITION         Family History   Adopted: Yes         Medications have been verified  Objective   BP (!) 174/91   Pulse 75   Temp 98 7 °F (37 1 °C) (Temporal)   Resp 18   Wt 101 kg (222 lb 6 4 oz)   SpO2 97%   BMI 31 91 kg/m²        Physical Exam     Physical Exam   Constitutional: He appears well-developed and well-nourished  HENT:   Head: Normocephalic  Right Ear: Hearing and tympanic membrane normal    Left Ear: Hearing and tympanic membrane normal    Ears:    Nose: No mucosal edema  Mouth/Throat: Uvula is midline and mucous membranes are normal  No posterior oropharyngeal erythema  Cardiovascular: Normal rate and regular rhythm  Pulmonary/Chest: Effort normal and breath sounds normal    Nursing note and vitals reviewed

## 2019-12-12 ENCOUNTER — OFFICE VISIT (OUTPATIENT)
Dept: PHYSICAL THERAPY | Facility: CLINIC | Age: 59
End: 2019-12-12
Payer: COMMERCIAL

## 2019-12-12 DIAGNOSIS — M75.111 INCOMPLETE TEAR OF RIGHT ROTATOR CUFF, UNSPECIFIED WHETHER TRAUMATIC: Primary | ICD-10-CM

## 2019-12-12 PROCEDURE — 97110 THERAPEUTIC EXERCISES: CPT | Performed by: PHYSICAL THERAPIST

## 2019-12-12 PROCEDURE — 97140 MANUAL THERAPY 1/> REGIONS: CPT | Performed by: PHYSICAL THERAPIST

## 2019-12-12 NOTE — PROGRESS NOTES
Daily Note     Today's date: 2019  Patient name: Justin Amaya  : 1960  MRN: [id number]  Referring provider: Chito Nicholas MD  Dx:   Encounter Diagnosis     ICD-10-CM    1  Incomplete tear of right rotator cuff, unspecified whether traumatic M75 111        Start Time: 1250  Stop Time: 1345  Total time in clinic (min): 55 minutes    Subjective: Pt reports "I can feel it getting better " No new complaints noted at this time  Objective: See treatment diary below      Assessment: Tolerated treatment well  Patient exhibited good technique with therapeutic exercises and would benefit from continued PT  Pt with good tolerance to exercises this visit  No increased pain reported t/o treatment  Occasional cuing required for proper exercise technique  No adverse reaction noted to CP application  Plan: Continue per plan of care  Progress treatment as tolerated  Precautions:  S/P Right RTC Tear    See Protocol        Manual  19   Right Shoulder PROM 10 min  10' 10' 10' 15 min                      Exercise Diary  19   Pendulums            shrugs    30x  2#      retractions           Bicep curl    30x  2#       Bent over row    30x  2#      pulleys 5 min Flex  5'  flex 5 min Flex  5' 5 min flex    Sub-max flex, ext, add, IR           Wall slides 30x Flex 30x  flex 30x Flex  20x 30x Flex    T-band Ext, Row, Add, IR/ER 30x ea  Green  Green  30x Green 30x ea Green  30x ea 30x ea Green    Sidelying Abd/ER ER 30x 3#  Abd 30x 2# ER  20x ER  20x  Abd  10x ER 30x  1# ER 30x 2#   Supine Cane Flexion  10x 5"        TB Punches 30x Green      T-band punches green 30x TB Punches  30x Green    UBE    4'  Alt  80 rpm      Towel IR stretch  15x  5"        Doorway ER stretch  15x  5"                                                        HEP      Updated HEP and issued green t-band          Modalities 19 11-20-19 12/5/19   CP 5 min  10x 10 min  10' 5 min    CP with IFC

## 2019-12-18 ENCOUNTER — APPOINTMENT (OUTPATIENT)
Dept: PHYSICAL THERAPY | Facility: CLINIC | Age: 59
End: 2019-12-18
Payer: COMMERCIAL

## 2019-12-26 ENCOUNTER — OFFICE VISIT (OUTPATIENT)
Dept: PHYSICAL THERAPY | Facility: CLINIC | Age: 59
End: 2019-12-26
Payer: COMMERCIAL

## 2019-12-26 DIAGNOSIS — M75.111 INCOMPLETE TEAR OF RIGHT ROTATOR CUFF, UNSPECIFIED WHETHER TRAUMATIC: Primary | ICD-10-CM

## 2019-12-26 PROCEDURE — 97140 MANUAL THERAPY 1/> REGIONS: CPT | Performed by: PHYSICAL THERAPIST

## 2019-12-26 PROCEDURE — 97110 THERAPEUTIC EXERCISES: CPT | Performed by: PHYSICAL THERAPIST

## 2019-12-26 NOTE — PROGRESS NOTES
Daily Note     Today's date: 2019  Patient name: Landy Mauricio  : 1960  MRN: [id number]  Referring provider: Chari Araujo MD  Dx:   Encounter Diagnosis     ICD-10-CM    1  Incomplete tear of right rotator cuff, unspecified whether traumatic M75 111        Start Time: 1250  Stop Time: 1350  Total time in clinic (min): 60 minutes    Subjective: Pt reports "It was hurting this morning  Yesterday it was okay "       Objective: See treatment diary below      Assessment: Tolerated treatment well  Patient would benefit from continued PT  Pt with good tolerance to exercises this visit  Cuing required t/o treatment for proper exercise technique  Progressed strengthening exercises without increased symptoms  No adverse reaction noted to CP application  Plan: Continue per plan of care  Progress treatment as tolerated  Precautions:  S/P Right RTC Tear    See Protocol        Manual  19   Right Shoulder PROM 10 min  10 min  10' 10' 15 min                     Exercise Diary  19   Pendulums           shrugs   30x  2#      retractions          Bicep curl   30x  2#       Bent over row   30x  2#      pulleys 5 min Flex  5 min Flex  5 min Flex  5' 5 min flex    Sub-max flex, ext, add, IR          Wall slides 30x Flex 30x Flex  30x Flex  20x 30x Flex    T-band Ext, Row, Add, IR/ER 30x ea  Green  30x ea  Blue  Green 30x ea Green  30x ea 30x ea Green    Sidelying Abd/ER ER 30x 3#  Abd 30x 2# ER 30x 4#  Abd 30x 3# ER  20x  Abd  10x ER 30x  1# ER 30x 2#   Supine Cane Flexion          TB Punches 30x Green  30x   Blue    T-band punches green 30x TB Punches  30x Green    UBE   4'  Alt  80 rpm      Towel IR stretch          Doorway ER stretch                                                      HEP     Updated HEP and issued green t-band          Modalities 19   CP 5 min  5 min  10 min 10' 5 min    CP with IFC

## 2020-01-08 ENCOUNTER — EVALUATION (OUTPATIENT)
Dept: PHYSICAL THERAPY | Facility: CLINIC | Age: 60
End: 2020-01-08
Payer: COMMERCIAL

## 2020-01-08 ENCOUNTER — TRANSCRIBE ORDERS (OUTPATIENT)
Dept: PHYSICAL THERAPY | Facility: CLINIC | Age: 60
End: 2020-01-08

## 2020-01-08 DIAGNOSIS — M75.111 INCOMPLETE TEAR OF RIGHT ROTATOR CUFF, UNSPECIFIED WHETHER TRAUMATIC: Primary | ICD-10-CM

## 2020-01-08 PROCEDURE — 97164 PT RE-EVAL EST PLAN CARE: CPT

## 2020-01-08 PROCEDURE — 97110 THERAPEUTIC EXERCISES: CPT

## 2020-01-08 NOTE — LETTER
2020    [facility]    Patient: Perico Tello   YOB: 1960   Date of Visit: 2020     Encounter Diagnosis     ICD-10-CM    1  Incomplete tear of right rotator cuff, unspecified whether traumatic M75 111        Dear Dr Abigail Ellis: Thank you for your recent referral of Perico Tello  Please review the attached evaluation summary from Farzad's recent visit  Please verify that you agree with the plan of care by signing the attached order  If you have any questions or concerns, please do not hesitate to call  I sincerely appreciate the opportunity to share in the care of one of your patients and hope to have another opportunity to work with you in the near future  Sincerely,    Ruben Johnson, PT      Referring Provider:      I certify that I have read the below Plan of Care and certify the need for these services furnished under this plan of treatment while under my care  MD [address], [address]: [phone]          PT RE-EVALUATION     Today's date: 2020  Patient name: Perico Tello  : 1960  MRN: [id number]  Referring provider: Maggie Anderson MD  Dx:   Encounter Diagnosis     ICD-10-CM    1  Incomplete tear of right rotator cuff, unspecified whether traumatic M75 111                   Assessment  Assessment details: Pt presented s/p Right RTC repair on 19  Pt reports continued pain with varied intensities depending on activity  Reports heavier lifting/activity with RUE with work duties  PT notes continued limitations in end range motions due to pain/tightness  Decreased strength noted with continued mm atrophy RUE  Pt has had limited attendance to PT over the past 6-7 weeks  Reports he has missed last Ortho MD follow up as well  PT advised to re-schedule MD follow up    Pt would benefit from continued PT tx to restore normal functional level  Impairments: abnormal or restricted ROM, activity intolerance, impaired physical strength, lacks appropriate home exercise program and pain with function    Goals  ST  Decrease pain 50% 8 wk  2  Increase shoulder PROM to [facility] 8 wk  3  Increase shoulder strength to 3+/5 8 wk  Pt will report no difficulty with activity below shoulder level 8 wk  LT  Pt will report no pain 16 wk  2  Increase shoulder AROM to WNL 16 wk  3  Increase shoulder strength to WNL 16 wk  Pt will report no limitations with ADL's 16 wk    Plan  Patient would benefit from: PT eval  Planned modality interventions: cryotherapy, thermotherapy: hydrocollator packs and unattended electrical stimulation  Planned therapy interventions: joint mobilization, manual therapy, strengthening, stretching, therapeutic activities, therapeutic exercise, flexibility, functional ROM exercises and home exercise program  Frequency: 2x week  Duration in weeks: 16  Treatment plan discussed with: patient        Subjective Evaluation    History of Present Illness  Date of surgery: 2019  Mechanism of injury: Pt presents s/p right RTC repair on 19  Reports prior right shoulder surgery approx 7-8 years ago as well  Reports falling 2 years ago onto right arm/elbow and hearing "snap/pop"  Went to ER and reports he had fx  Reports pain and limited use of UE since that time  Functional level varied  RTC surgery advised to help with this  Reports continued pain since surgery  Reports intermittent numbness right hand and tingling sensation down RUE  Next MD follow up 19  Reports he is not using sling during the day or with sleep at night  Reports using RUE to assist lifting grandson and with ADL's in limited manner  Has returned to driving      Pain  Current pain rating: 3  At best pain ratin  At worst pain ratin  Location: Right shoulder  Quality: dull ache, sharp, radiating, throbbing and burning  Progression: improved    Hand dominance: right  Exercise history: enjoys [employer]   Life stress: Works as [employer]  Has not been able to do this due to shoulder  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  Patient Goals  Patient goals for therapy: increased strength, decreased pain, return to sport/leisure activities, increased motion and independence with ADLs/IADLs          Objective     Observations     Right Shoulder  Positive for atrophy and incision  Additional Observation Details  Continued atrophy noted right shoulder/UE      Tenderness     Additional Tenderness Details  Tenderness to palpation mid-deltoid region    Active Range of Motion     Right Shoulder   Flexion: 130 degrees with pain  External rotation BTH: C6 with pain  Internal rotation BTB: L4 with pain    Additional Active Range of Motion Details  Pain/Tightness at end ranges     Passive Range of Motion     Right Shoulder   Flexion: 150 degrees with pain  Abduction: 120 degrees with pain  External rotation 90°:  65 degrees with pain  Internal rotation 90°:  68 degrees with pain    Right Elbow   Normal passive range of motion    Additional Passive Range of Motion Details  Pain/Tightness at end ranges    Strength/Myotome Testing     Left Shoulder   Normal muscle strength    Right Shoulder     Planes of Motion   Flexion: 4   Abduction: 4   External rotation at 0°:  4-   Internal rotation at 0°:  4     Isolated Muscles   Biceps: 4   Triceps: 4-     Additional Strength Details  Strength in available ROM                 Encounter Diagnosis     ICD-10-CM    1  Incomplete tear of right rotator cuff, unspecified whether traumatic M75 111                 Precautions:  S/P Right RTC Tear    See Protocol        Manual  12/12/19 12/26/19 1-8-20 11-20-19 12/5/19   Right Shoulder PROM 10 min  10 min   10' 15 min                     Exercise Diary 12/12/19 12/26/19 1/8/20 11-20-19 12/5/19   Pendulums           shrugs         retractions          Bicep curl         Bent over row         pulleys 5 min Flex  5 min Flex  5 min   5' 5 min flex    Sub-max flex, ext, add, IR          Wall slides 30x Flex 30x Flex  30x Flex  20x 30x Flex    T-band Ext, Row, Add, IR/ER 30x ea  Green  30x ea  Blue  Blue 30x ea Green  30x ea 30x ea Green    Seated Row   10#  30X     Lat Pulldown   15#  30x      TB Punches 30x Green  30x   Blue    T-band punches green 30x TB Punches  30x Green    UBE   6'  Alt        Towel IR stretch          Doorway ER stretch    5x  15-20"                                                  HEP     Updated HEP and issued green t-band          Modalities 12/12/19 12/26/19 1/8/20 11-20-19 12/5/19   CP 5 min  5 min  Declined  10' 5 min    CP with IFC

## 2020-01-08 NOTE — PROGRESS NOTES
PT RE-EVALUATION     Today's date: 2020  Patient name: Abhilash Joshi  : 1960  MRN: [id number]  Referring provider: Jennifer Roldan MD  Dx:   Encounter Diagnosis     ICD-10-CM    1  Incomplete tear of right rotator cuff, unspecified whether traumatic M75 111                   Assessment  Assessment details: Pt presented s/p Right RTC repair on 19  Pt reports continued pain with varied intensities depending on activity  Reports heavier lifting/activity with RUE with work duties  PT notes continued limitations in end range motions due to pain/tightness  Decreased strength noted with continued mm atrophy RUE  Pt has had limited attendance to PT over the past 6-7 weeks  Reports he has missed last Ortho MD follow up as well  PT advised to re-schedule MD follow up  Pt would benefit from continued PT tx to restore normal functional level  Impairments: abnormal or restricted ROM, activity intolerance, impaired physical strength, lacks appropriate home exercise program and pain with function    Goals  ST  Decrease pain 50% 8 wk  2  Increase shoulder PROM to [facility] 8 wk  3  Increase shoulder strength to 3+/5 8 wk  Pt will report no difficulty with activity below shoulder level 8 wk  LT  Pt will report no pain 16 wk  2  Increase shoulder AROM to WNL 16 wk  3  Increase shoulder strength to WNL 16 wk    Pt will report no limitations with ADL's 16 wk    Plan  Patient would benefit from: PT eval  Planned modality interventions: cryotherapy, thermotherapy: hydrocollator packs and unattended electrical stimulation  Planned therapy interventions: joint mobilization, manual therapy, strengthening, stretching, therapeutic activities, therapeutic exercise, flexibility, functional ROM exercises and home exercise program  Frequency: 2x week  Duration in weeks: 16  Treatment plan discussed with: patient        Subjective Evaluation    History of Present Illness  Date of surgery: 2019  Mechanism of injury: Pt presents s/p right RTC repair on 19  Reports prior right shoulder surgery approx 7-8 years ago as well  Reports falling 2 years ago onto right arm/elbow and hearing "snap/pop"  Went to ER and reports he had fx  Reports pain and limited use of UE since that time  Functional level varied  RTC surgery advised to help with this  Reports continued pain since surgery  Reports intermittent numbness right hand and tingling sensation down RUE  Next MD follow up 19  Reports he is not using sling during the day or with sleep at night  Reports using RUE to assist lifting grandson and with ADL's in limited manner  Has returned to driving  Pain  Current pain rating: 3  At best pain ratin  At worst pain ratin  Location: Right shoulder  Quality: dull ache, sharp, radiating, throbbing and burning  Progression: improved    Hand dominance: right  Exercise history: enjoys [employer]   Life stress: Works as [employer]  Has not been able to do this due to shoulder  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  Patient Goals  Patient goals for therapy: increased strength, decreased pain, return to sport/leisure activities, increased motion and independence with ADLs/IADLs          Objective     Observations     Right Shoulder  Positive for atrophy and incision       Additional Observation Details  Continued atrophy noted right shoulder/UE      Tenderness     Additional Tenderness Details  Tenderness to palpation mid-deltoid region    Active Range of Motion     Right Shoulder   Flexion: 130 degrees with pain  External rotation BTH: C6 with pain  Internal rotation BTB: L4 with pain    Additional Active Range of Motion Details  Pain/Tightness at end ranges     Passive Range of Motion     Right Shoulder   Flexion: 150 degrees with pain  Abduction: 120 degrees with pain  External rotation 90°: 65 degrees with pain  Internal rotation 90°: 68 degrees with pain    Right Elbow   Normal passive range of motion    Additional Passive Range of Motion Details  Pain/Tightness at end ranges    Strength/Myotome Testing     Left Shoulder   Normal muscle strength    Right Shoulder     Planes of Motion   Flexion: 4   Abduction: 4   External rotation at 0°: 4-   Internal rotation at 0°: 4     Isolated Muscles   Biceps: 4   Triceps: 4-     Additional Strength Details  Strength in available ROM                 Encounter Diagnosis     ICD-10-CM    1  Incomplete tear of right rotator cuff, unspecified whether traumatic M75 111                 Precautions:  S/P Right RTC Tear    See Protocol        Manual  12/12/19 12/26/19 1-8-20 11-20-19 12/5/19   Right Shoulder PROM 10 min  10 min   10' 15 min                     Exercise Diary  12/12/19 12/26/19 1/8/20 11-20-19 12/5/19   Pendulums           shrugs         retractions          Bicep curl         Bent over row         pulleys 5 min Flex  5 min Flex  5 min   5' 5 min flex    Sub-max flex, ext, add, IR          Wall slides 30x Flex 30x Flex  30x Flex  20x 30x Flex    T-band Ext, Row, Add, IR/ER 30x ea  Green  30x ea  Blue  Blue 30x ea Green  30x ea 30x ea Green    Seated Row   10#  30X     Lat Pulldown   15#  30x      TB Punches 30x Green  30x   Blue    T-band punches green 30x TB Punches  30x Green    UBE   6'  Alt        Towel IR stretch          Doorway ER stretch    5x  15-20"                                                  HEP     Updated HEP and issued green t-band          Modalities 12/12/19 12/26/19 1/8/20 11-20-19 12/5/19   CP 5 min  5 min  Declined  10' 5 min    CP with IFC

## 2020-01-14 DIAGNOSIS — M54.12 CERVICAL RADICULOPATHY: ICD-10-CM

## 2020-01-15 ENCOUNTER — OFFICE VISIT (OUTPATIENT)
Dept: PHYSICAL THERAPY | Facility: CLINIC | Age: 60
End: 2020-01-15
Payer: COMMERCIAL

## 2020-01-15 DIAGNOSIS — M75.111 INCOMPLETE TEAR OF RIGHT ROTATOR CUFF, UNSPECIFIED WHETHER TRAUMATIC: Primary | ICD-10-CM

## 2020-01-15 PROCEDURE — 97140 MANUAL THERAPY 1/> REGIONS: CPT | Performed by: PHYSICAL THERAPIST

## 2020-01-15 PROCEDURE — 97110 THERAPEUTIC EXERCISES: CPT | Performed by: PHYSICAL THERAPIST

## 2020-01-15 RX ORDER — GABAPENTIN 100 MG/1
CAPSULE ORAL
Qty: 30 CAPSULE | Refills: 0 | Status: SHIPPED | OUTPATIENT
Start: 2020-01-15 | End: 2020-09-30 | Stop reason: SDUPTHER

## 2020-01-15 NOTE — PROGRESS NOTES
Daily Note     Today's date: 1/15/2020  Patient name: Vickie Cadet  : 1960  MRN: [id number]  Referring provider: Dalia Kaufman MD  Dx:   Encounter Diagnosis     ICD-10-CM    1  Incomplete tear of right rotator cuff, unspecified whether traumatic M75 111        Start Time: 1400  Stop Time: 1500  Total time in clinic (min): 60 minutes    Subjective: Pt with no new complaints at this time  No pain reported at the start of treatment  Objective: See treatment diary below      Assessment: Tolerated treatment well  Patient would benefit from continued PT  Pt with good tolerance to exercises this visit  No increased pain reported t/o treatment  Continue to progress as tolerated by the patient  No adverse reaction noted to CP application  Plan: Continue per plan of care  Progress treatment as tolerated  Precautions:  S/P Right RTC Tear    See Protocol        Manual  12/12/19 12/26/19 1-8-20 1/15/20 12/5/19   Right Shoulder PROM 10 min  10 min   15 min  15 min                    Exercise Diary  12/12/19 12/26/19 1/8/20 1/15/20 12/5/19   Pendulums          shrugs        retractions         Bicep curl        Bent over row        pulleys 5 min Flex  5 min Flex  5 min   5 min  5 min flex    Sub-max flex, ext, add, IR         Wall slides 30x Flex 30x Flex  30x Flex  30x Flex 30x Flex    T-band Ext, Row, Add, IR/ER 30x ea  Green  30x ea  Blue  Blue 30x ea 30x ea Blue  30x ea Green    Seated Row   10#  30X 30x 10#    Lat Pulldown   15#  30x 30x 15#    TB Punches 30x Green  30x   Blue     TB Punches  30x Green    UBE   6'  Alt   5'/5'    Towel IR stretch         Doorway ER stretch    5x  15-20" 5x 15-20"                                            HEP               Modalities 12/12/19 12/26/19 1/8/20 1/15/20 12/5/19   CP 5 min  5 min  Declined  5 min 5 min    CP with IFC

## 2020-01-22 ENCOUNTER — APPOINTMENT (OUTPATIENT)
Dept: PHYSICAL THERAPY | Facility: CLINIC | Age: 60
End: 2020-01-22
Payer: COMMERCIAL

## 2020-01-23 ENCOUNTER — APPOINTMENT (OUTPATIENT)
Dept: PHYSICAL THERAPY | Facility: CLINIC | Age: 60
End: 2020-01-23
Payer: COMMERCIAL

## 2020-02-03 NOTE — PROGRESS NOTES
PT DISCHARGE     Today's date: 2/3/2020  Patient name: Abhilash Joshi  : 1960  MRN: [id number]  Referring provider: Jennifer Roldan MD  Dx:   Encounter Diagnosis     ICD-10-CM    1  Incomplete tear of right rotator cuff, unspecified whether traumatic M75 111        Start Time: 1400  Stop Time: 1500  Total time in clinic (min): 60 minutes    Assessment  Assessment details: Pt presented s/p Right RTC repair on 19  Pt reported continued pain with varied intensities depending on activity  Reported heavier lifting/activity with RUE with work duties  PT noted continued limitations in end range motions due to pain/tightness  Decreased strength noted with continued mm atrophy RUE  Pt has had limited attendance to PT over the past 2 months  Reports he had missed last Ortho MD follow up as well  Pt last session was on 1-15-20  Pt contacted PT after this time and reported he was advised to discontinue PT by MD he was seeing for cervical spine due to increased symptoms in this region  D/C PT services per MD recommendations  Impairments: abnormal or restricted ROM, activity intolerance, impaired physical strength, lacks appropriate home exercise program and pain with function    Goals  ST  Decrease pain 50% 8 wk  2  Increase shoulder PROM to [facility] 8 wk  3  Increase shoulder strength to 3+/5 8 wk  Pt will report no difficulty with activity below shoulder level 8 wk  LT  Pt will report no pain 16 wk  2  Increase shoulder AROM to WNL 16 wk  3  Increase shoulder strength to WNL 16 wk    Pt will report no limitations with ADL's 16 wk    Plan  Plan details: D/C PT services  Findings per last Re-evaluation   Patient would benefit from: PT eval  Planned modality interventions: cryotherapy, thermotherapy: hydrocollator packs and unattended electrical stimulation  Planned therapy interventions: joint mobilization, manual therapy, strengthening, stretching, therapeutic activities, therapeutic exercise, flexibility, functional ROM exercises and home exercise program        Subjective Evaluation    History of Present Illness  Date of surgery: 2019  Mechanism of injury: Pt presents s/p right RTC repair on 19  Reports prior right shoulder surgery approx 7-8 years ago as well  Reports falling 2 years ago onto right arm/elbow and hearing "snap/pop"  Went to ER and reports he had fx  Reports pain and limited use of UE since that time  Functional level varied  RTC surgery advised to help with this  Reports continued pain since surgery  Reports intermittent numbness right hand and tingling sensation down RUE  Next MD follow up 19  Reports he is not using sling during the day or with sleep at night  Reports using RUE to assist lifting grandson and with ADL's in limited manner  Has returned to driving  Pain  Current pain rating: 3  At best pain ratin  At worst pain ratin  Location: Right shoulder  Quality: dull ache, sharp, radiating, throbbing and burning  Progression: improved    Hand dominance: right  Exercise history: enjoys [employer]   Life stress: Works as [employer]  Has not been able to do this due to shoulder  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  Patient Goals  Patient goals for therapy: increased strength, decreased pain, return to sport/leisure activities, increased motion and independence with ADLs/IADLs          Objective     Observations     Right Shoulder  Positive for atrophy and incision       Additional Observation Details  Continued atrophy noted right shoulder/UE      Tenderness     Additional Tenderness Details  Tenderness to palpation mid-deltoid region    Active Range of Motion     Right Shoulder   Flexion: 130 degrees with pain  External rotation BTH: C6 with pain  Internal rotation BTB: L4 with pain    Additional Active Range of Motion Details  Pain/Tightness at end ranges     Passive Range of Motion     Right Shoulder   Flexion: 150 degrees with pain  Abduction: 120 degrees with pain  External rotation 90°: 65 degrees with pain  Internal rotation 90°: 68 degrees with pain    Right Elbow   Normal passive range of motion    Additional Passive Range of Motion Details  Pain/Tightness at end ranges    Strength/Myotome Testing     Left Shoulder   Normal muscle strength    Right Shoulder     Planes of Motion   Flexion: 4   Abduction: 4   External rotation at 0°: 4-   Internal rotation at 0°: 4     Isolated Muscles   Biceps: 4   Triceps: 4-     Additional Strength Details  Strength in available ROM                 Encounter Diagnosis     ICD-10-CM    1   Incomplete tear of right rotator cuff, unspecified whether traumatic M75 111        Start Time: 1400  Stop Time: 1500  Total time in clinic (min): 60 minutes

## 2020-02-26 ENCOUNTER — OFFICE VISIT (OUTPATIENT)
Dept: NEUROLOGY | Facility: CLINIC | Age: 60
End: 2020-02-26
Payer: COMMERCIAL

## 2020-02-26 VITALS
HEIGHT: 70 IN | BODY MASS INDEX: 32.07 KG/M2 | HEART RATE: 76 BPM | WEIGHT: 224 LBS | SYSTOLIC BLOOD PRESSURE: 140 MMHG | DIASTOLIC BLOOD PRESSURE: 90 MMHG

## 2020-02-26 DIAGNOSIS — M54.12 CERVICAL RADICULOPATHY: Primary | ICD-10-CM

## 2020-02-26 PROCEDURE — 99214 OFFICE O/P EST MOD 30 MIN: CPT | Performed by: PSYCHIATRY & NEUROLOGY

## 2020-02-26 NOTE — PROGRESS NOTES
Maximino Doyle is a 63 y o  male  Chief Complaint   Patient presents with    Neurologic Problem       Assessment:  1  Cervical radiculopathy          Discussion:  Patient was advised to repeat an MRI of the C-spine with and without contrast as he had an abnormal MRI scan in April of 2019, his insurance company had denied the MRI scan last time, patient does not have any clinical signs and symptoms of MS, depending on the MRI scan we could evaluate him, also his EMG had shown severe ulnar neuropathy on the right for which she had surgery and chronic severe right C7 radiculopathy, according to the patient he was advised by his spine surgeon Dr Keo Jefferson to repeat the EMG, we would order that, I've advised the patient to follow up with Dr Keo Jefferson regarding his neck pain, and the abnormality on the MRI scan, continue with Neurontin, he will also discuss with Dr Keo Jefferson and see a pain specialist, he is in follow up with Dr Clay Maria regarding his shoulder and his ulnar neuropathy, he could not tolerate the physical therapy secondary to his shoulder issues, to go to the hospital if has any worsening symptoms and call me otherwise to see me back in 2 months and follow up with his other physicians  Subjective:    HPI   Patient is here accompanied with his wife for his neck pain radiating to the right arm for the last 2 years, he describes his pain is at least 5-6 on 10 radiating to the right arm mostly in the ulnar aspect associated with numbness and tingling sensation, he cannot recall of any aggravating or relieving factors, he has had the right rotator cuff surgery and is recovering from that, he could not tolerate physical therapy, he is also status post right ulnar surgery to the elbow in May, denies any history of trauma, no vision or speech difficulty, no bowel and bladder incontinence, no symptoms in the left arm or in the lower extremity, no other neurological complaints      Vitals:    02/26/20 1212   BP: 140/90   BP Location: Right arm   Patient Position: Sitting   Cuff Size: Adult   Pulse: 76   Weight: 102 kg (224 lb)   Height: 5' 10" (1 778 m)       Current Medications    Current Outpatient Medications:     albuterol (PROVENTIL HFA,VENTOLIN HFA) 90 mcg/act inhaler, Inhale 2 puffs every 6 (six) hours as needed, Disp: , Rfl:     amLODIPine (NORVASC) 10 mg tablet, Take 1 tablet by mouth Daily, Disp: , Rfl:     atorvastatin (LIPITOR) 40 mg tablet, TAKE 1 TABLET BY MOUTH NIGHTLY, Disp: , Rfl:     benazepril (LOTENSIN) 40 MG tablet, Take 1 tablet by mouth daily, Disp: , Rfl:     Ertugliflozin-metFORMIN HCl 2 5-1000 MG TABS, Take 2 5 mg by mouth 2 (two) times a day, Disp: , Rfl:     gabapentin (NEURONTIN) 100 mg capsule, TAKE ONE CAPSULE BY MOUTH AT BEDTIME, Disp: 30 capsule, Rfl: 0    glipiZIDE (GLUCOTROL) 10 mg tablet, Take 10 mg by mouth 2 (two) times a day before meals, Disp: , Rfl:     ibuprofen (MOTRIN) 800 mg tablet, Take 1 tablet by mouth 3 (three) times a day, Disp: , Rfl:     metFORMIN (GLUCOPHAGE) 1000 MG tablet, Take 1,000 mg by mouth 2 (two) times a day, Disp: , Rfl:       Allergies  Pravastatin    Past Medical History  Past Medical History:   Diagnosis Date    Diabetes mellitus ([facility] [address] )     Hyperlipidemia     Hypertension          Past Surgical History:  Past Surgical History:   Procedure Laterality Date    CARPAL TUNNEL RELEASE      CYST REMOVAL      EYE SURGERY      LEG SURGERY      shrapnel removal    ROTATOR CUFF REPAIR Right     ULNAR NERVE TRANSPOSITION           Family History:  Family History   Adopted: Yes       Social History:   reports that he has quit smoking  His smoking use included cigars  His smokeless tobacco use includes chew  He reports that he drinks alcohol  He reports that he does not use drugs      I have reviewed the past medical history, surgical history, social and family history, current medications, allergies vitals, review of systems, and updated this information as appropriate today  Objective:    Physical Exam    Neurological Exam    GENERAL:  Cooperative in no acute distress  Well-developed and well-nourished    HEAD and NECK   Head is atraumatic normocephalic with no lesions or masses  Neck is supple with full range of motion    CARDIOVASCULAR  Carotid Arteries-no carotid bruits  NEUROLOGIC:  Mental Status-the patient is awake alert and oriented without aphasia or apraxia  Cranial Nerves: Visual fields are full to confrontation    Extraocular movements are full without nystagmus  Pupils are 2-1/2 mm and reactive  Face is symmetrical to light touch  Movements of facial expression move symmetrically  Hearing is normal to finger rub bilaterally  Soft palate lifts symmetrically  Shoulder shrug is symmetrical  Tongue is midline without atrophy  Motor: No drift is noted on arm extension  Strength is full in the upper and lower extremities with normal bulk and tone, some atrophy in the muscles of the ulnar nerve on the right forearm and decreased range of movement of the right shoulder above horizontal  Sensory: Intact to temperature and vibratory sensation in the upper and lower extremities bilaterally  Cortical function is intact  Coordination: Finger to nose testing is performed accurately    Gait reveals a normal base with symmetrical arm swing  Reflexes:   1+ and symmetrical,  Paraspinal muscle tenderness in the cervical area  Paraspinal muscle tenderness in the cervical area          ROS:  Review of Systems   Constitutional: Negative  Negative for appetite change and fever  HENT: Negative  Negative for hearing loss, tinnitus, trouble swallowing and voice change  Eyes: Negative  Negative for photophobia and pain  Respiratory: Negative  Negative for shortness of breath  Cardiovascular: Negative  Negative for palpitations  Gastrointestinal: Negative  Negative for nausea and vomiting  Endocrine: Negative    Negative for cold intolerance and heat intolerance  Genitourinary: Negative  Negative for dysuria, frequency and urgency  Musculoskeletal: Negative  Negative for myalgias and neck pain  Skin: Negative  Negative for rash  Neurological: Positive for numbness  Negative for dizziness, tremors, seizures, syncope, facial asymmetry, speech difficulty, weakness, light-headedness and headaches  Patient states that he has bilateral numbness in shoulder and arms  Hematological: Negative  Does not bruise/bleed easily  Psychiatric/Behavioral: Negative  Negative for confusion, hallucinations and sleep disturbance

## 2020-02-28 ENCOUNTER — PROCEDURE VISIT (OUTPATIENT)
Dept: NEUROLOGY | Facility: CLINIC | Age: 60
End: 2020-02-28
Payer: COMMERCIAL

## 2020-02-28 ENCOUNTER — TELEPHONE (OUTPATIENT)
Dept: NEUROLOGY | Facility: CLINIC | Age: 60
End: 2020-02-28

## 2020-02-28 DIAGNOSIS — M54.12 CERVICAL RADICULOPATHY: ICD-10-CM

## 2020-02-28 PROCEDURE — 95886 MUSC TEST DONE W/N TEST COMP: CPT | Performed by: PHYSICAL MEDICINE & REHABILITATION

## 2020-02-28 PROCEDURE — 95911 NRV CNDJ TEST 9-10 STUDIES: CPT | Performed by: PHYSICAL MEDICINE & REHABILITATION

## 2020-02-28 NOTE — TELEPHONE ENCOUNTER
Pt called requesting copy of EMG results  Advised pt that he needs to fill out DAVID before we can release his record  Pt verbalized understanding     Pt will stop by [facility] office next week before 5 pm

## 2020-02-28 NOTE — TELEPHONE ENCOUNTER
Discussed with patient EMG results, advised the patient to see a hand surgeon, he would like to see Dr Mark Jacobo and he would make his own appointment

## 2020-02-28 NOTE — PROGRESS NOTES
EMG 2 Limb Upper Extremity     Date/Time 2/28/2020 1:50 PM     Performed by  Sammy Grant MD     Authorized by Veronica Valero MD      Universal Protocol Consent: Verbal consent obtained  Risks and benefits: risks, benefits and alternatives were discussed  Consent given by: patient  Patient understanding: patient states understanding of the procedure being performed  Patient consent: the patient's understanding of the procedure matches consent given  Patient identity confirmed: verbally with patient               EMG REPORT    64-year-old male presents with neck pain radiating to the right arm for the last 2 years  It radiates mainly to the ulnar aspect associated with tingling and numbness  He had a right rotator cuff surgery recently and he is status post right ulnar surgery to the elbow in May 2019  Denies any history of trauma  Denies any symptoms in the left upper extremity  Patient is being evaluated for a focal neuropathy versus cervical radiculopathy  On physical examination, motor strength is 5/5 throughout except right elbow extension is 4/5 and pronation is 4+/5  Sensations are diminished to light touch and pinprick in the bilateral median and ulnar nerve distribution  The left median motor terminal latency was prolonged at 4 6 milliseconds with a normal compound motor action potential amplitude and a normal conduction velocity across the wrist   The right median motor terminal latency was normal with a  normal compound motor action potential amplitude and a normal conduction velocity across the wrist   The right ulnar motor terminal latency was prolonged at 3 4 milliseconds with a low compound motor action potential amplitude of 3 5 mV and normal conduction velocity across the wrist but a slow conduction velocity of 37 m/sec across the elbow    The left ulnar motor terminal latency was normal with normal compound motor action potential amplitude and normal conduction velocity distally and across the elbow  The left median sensory peak latency was prolonged at 3 7 milliseconds with a low sensory action potential amplitude of 13 microvolts  The  right median sensory peak latency was normal with a low sensory action potential amplitude of 12 microvolts  The right radial sensory peak latency was normal with a low sensory action potential amplitude of 7 microvolts  The left radial sensory peak latency was normal with a low sensory action potential amplitude of 12 microvolts  The right ulnar sensory peak latency was prolonged at 3 8 milliseconds with a low sensory action potential amplitude of 2 microvolts  The left ulnar sensory peak latency was normal with a low sensory action potential amplitude of 8 microvolts  The right median and ulnar palmar evoked response was normal   The left median palmar evoked response was prolonged by 0 8 milliseconds as compared to the left ulnar palmar evoked response at the same distance  The left median F-wave was mildly prolonged at 32 6 milliseconds  The right median F-wave was normal   The left  ulnar F wave latency was normal   The right ulnar F-wave latency was prolonged at 37 5 milliseconds  Concentric needle EMG was performed on various proximal and distal muscles of the bilateral upper extremities including deltoid, biceps, triceps, pronator teres, FCU, apb, FDI,EIP and low cervical paraspinals  There was no evidence of active denervation in any of the muscles tested  Moderate decreased recruitment of giant motor units was noted in the right pronator teres and triceps  Moderate decreased recruitment of giant and polyphasic motor units was noted in the right FCU and FDI  Mild decreased recruitment of polyphasic motor units was noted in the left APB  Early recruited motor units appear normal with recruitment patterns being full or full for effort in the remaining muscles tested      INTERPRETATION: There is electrophysiologic evidence of a:    1  Chronic right C6-7 radiculopathy as evidenced by the decreased recruitment and chronic denervation changes in the right pronator teres and triceps  2  Moderate ulnar neuropathy at the elbow on the right, with predominant axonal changes  3  Moderate median nerve compression neuropathy at the wrist on the left with demyelinative changes, consistent with a diagnosis of carpal tunnel syndrome  4  Coexisting diffuse sensory motor peripheral neuropathy cannot be completely excluded  Clinical and imaging correlation of the cervical spine is suggested             SHAUNNA Johnston

## 2020-03-02 ENCOUNTER — TELEPHONE (OUTPATIENT)
Dept: NEUROLOGY | Facility: CLINIC | Age: 60
End: 2020-03-02

## 2020-03-02 NOTE — TELEPHONE ENCOUNTER
----- Message from Shiv Guerrero MD sent at 2/28/2020  2:13 PM EST -----  Please call the patient regarding his abnormal result  Discussed with patient results, he would like to see Dr Fernando Wong, please fax then the EMG report

## 2020-03-04 ENCOUNTER — TELEPHONE (OUTPATIENT)
Dept: NEUROLOGY | Facility: CLINIC | Age: 60
End: 2020-03-04

## 2020-03-04 NOTE — TELEPHONE ENCOUNTER
Patient stopped by the office requesting copies of his EMG testing   Patient signed a release of records   Copies given to patient for his records

## 2020-03-13 ENCOUNTER — TRANSCRIBE ORDERS (OUTPATIENT)
Dept: ADMINISTRATIVE | Facility: HOSPITAL | Age: 60
End: 2020-03-13

## 2020-03-13 ENCOUNTER — TELEPHONE (OUTPATIENT)
Dept: NEUROLOGY | Facility: CLINIC | Age: 60
End: 2020-03-13

## 2020-03-13 DIAGNOSIS — M54.12 RADICULOPATHY, CERVICAL: Primary | ICD-10-CM

## 2020-03-13 NOTE — TELEPHONE ENCOUNTER
Dr Malik Santiago, patient has two MRI scheduled , one by you of MRI wow cervical  The other is ordering  Cervical wo  , on the same day, Please advise if you wish to do contrast or not

## 2020-03-13 NOTE — TELEPHONE ENCOUNTER
Please check the BUN and creatinine and make sure and get an MRI scan of the C-spine with and without contrast

## 2020-03-25 ENCOUNTER — APPOINTMENT (EMERGENCY)
Dept: RADIOLOGY | Facility: HOSPITAL | Age: 60
End: 2020-03-25
Payer: COMMERCIAL

## 2020-03-25 ENCOUNTER — HOSPITAL ENCOUNTER (EMERGENCY)
Facility: HOSPITAL | Age: 60
Discharge: HOME | End: 2020-03-25
Attending: EMERGENCY MEDICINE | Admitting: EMERGENCY MEDICINE
Payer: COMMERCIAL

## 2020-03-25 VITALS
HEART RATE: 62 BPM | WEIGHT: 227.74 LBS | DIASTOLIC BLOOD PRESSURE: 106 MMHG | OXYGEN SATURATION: 98 % | BODY MASS INDEX: 32.68 KG/M2 | TEMPERATURE: 97.6 F | SYSTOLIC BLOOD PRESSURE: 191 MMHG | RESPIRATION RATE: 17 BRPM

## 2020-03-25 DIAGNOSIS — I10 HYPERTENSION: ICD-10-CM

## 2020-03-25 DIAGNOSIS — R05.9 COUGH: Primary | ICD-10-CM

## 2020-03-25 DIAGNOSIS — E86.0 DEHYDRATION: ICD-10-CM

## 2020-03-25 DIAGNOSIS — E11.65 HYPERGLYCEMIA DUE TO TYPE 2 DIABETES MELLITUS (HCC): ICD-10-CM

## 2020-03-25 LAB
ALBUMIN SERPL BCP-MCNC: 4.1 G/DL (ref 3.5–5)
ALP SERPL-CCNC: 59 U/L (ref 46–116)
ALT SERPL W P-5'-P-CCNC: 65 U/L (ref 12–78)
ANION GAP SERPL CALCULATED.3IONS-SCNC: 10 MMOL/L (ref 4–13)
AST SERPL W P-5'-P-CCNC: 28 U/L (ref 5–45)
ATRIAL RATE: 74 BPM
BACTERIA UR QL AUTO: NORMAL /HPF
BASOPHILS # BLD AUTO: 0.03 THOUSANDS/ΜL (ref 0–0.1)
BASOPHILS NFR BLD AUTO: 0 % (ref 0–1)
BILIRUB SERPL-MCNC: 0.4 MG/DL (ref 0.2–1)
BILIRUB UR QL STRIP: NEGATIVE
BUN SERPL-MCNC: 17 MG/DL (ref 5–25)
CALCIUM SERPL-MCNC: 9.1 MG/DL (ref 8.3–10.1)
CHLORIDE SERPL-SCNC: 99 MMOL/L (ref 100–108)
CLARITY UR: CLEAR
CO2 SERPL-SCNC: 28 MMOL/L (ref 21–32)
COLOR UR: ABNORMAL
CREAT SERPL-MCNC: 0.83 MG/DL (ref 0.6–1.3)
EOSINOPHIL # BLD AUTO: 0.14 THOUSAND/ΜL (ref 0–0.61)
EOSINOPHIL NFR BLD AUTO: 2 % (ref 0–6)
ERYTHROCYTE [DISTWIDTH] IN BLOOD BY AUTOMATED COUNT: 13.2 % (ref 11.6–15.1)
GFR SERPL CREATININE-BSD FRML MDRD: 97 ML/MIN/1.73SQ M
GLUCOSE SERPL-MCNC: 230 MG/DL (ref 65–140)
GLUCOSE SERPL-MCNC: 379 MG/DL (ref 65–140)
GLUCOSE UR STRIP-MCNC: ABNORMAL MG/DL
HCT VFR BLD AUTO: 43.4 % (ref 36.5–49.3)
HGB BLD-MCNC: 15.1 G/DL (ref 12–17)
HGB UR QL STRIP.AUTO: NEGATIVE
IMM GRANULOCYTES # BLD AUTO: 0.02 THOUSAND/UL (ref 0–0.2)
IMM GRANULOCYTES NFR BLD AUTO: 0 % (ref 0–2)
KETONES UR STRIP-MCNC: NEGATIVE MG/DL
LEUKOCYTE ESTERASE UR QL STRIP: ABNORMAL
LYMPHOCYTES # BLD AUTO: 2.23 THOUSANDS/ΜL (ref 0.6–4.47)
LYMPHOCYTES NFR BLD AUTO: 28 % (ref 14–44)
MCH RBC QN AUTO: 29.5 PG (ref 26.8–34.3)
MCHC RBC AUTO-ENTMCNC: 34.8 G/DL (ref 31.4–37.4)
MCV RBC AUTO: 85 FL (ref 82–98)
MONOCYTES # BLD AUTO: 0.79 THOUSAND/ΜL (ref 0.17–1.22)
MONOCYTES NFR BLD AUTO: 10 % (ref 4–12)
NEUTROPHILS # BLD AUTO: 4.81 THOUSANDS/ΜL (ref 1.85–7.62)
NEUTS SEG NFR BLD AUTO: 60 % (ref 43–75)
NITRITE UR QL STRIP: NEGATIVE
NON-SQ EPI CELLS URNS QL MICRO: NORMAL /HPF
NRBC BLD AUTO-RTO: 0 /100 WBCS
P AXIS: 37 DEGREES
PH UR STRIP.AUTO: 7 [PH]
PLATELET # BLD AUTO: 246 THOUSANDS/UL (ref 149–390)
PMV BLD AUTO: 10.5 FL (ref 8.9–12.7)
POTASSIUM SERPL-SCNC: 3.7 MMOL/L (ref 3.5–5.3)
PR INTERVAL: 168 MS
PROT SERPL-MCNC: 7.9 G/DL (ref 6.4–8.2)
PROT UR STRIP-MCNC: NEGATIVE MG/DL
QRS AXIS: 14 DEGREES
QRSD INTERVAL: 96 MS
QT INTERVAL: 376 MS
QTC INTERVAL: 417 MS
RBC # BLD AUTO: 5.11 MILLION/UL (ref 3.88–5.62)
RBC #/AREA URNS AUTO: NORMAL /HPF
SODIUM SERPL-SCNC: 137 MMOL/L (ref 136–145)
SP GR UR STRIP.AUTO: <=1.005 (ref 1–1.03)
T WAVE AXIS: 61 DEGREES
TROPONIN I SERPL-MCNC: <0.02 NG/ML
UROBILINOGEN UR QL STRIP.AUTO: 0.2 E.U./DL
VENTRICULAR RATE: 74 BPM
WBC # BLD AUTO: 8.02 THOUSAND/UL (ref 4.31–10.16)
WBC #/AREA URNS AUTO: NORMAL /HPF

## 2020-03-25 PROCEDURE — 93010 ELECTROCARDIOGRAM REPORT: CPT | Performed by: INTERNAL MEDICINE

## 2020-03-25 PROCEDURE — 93005 ELECTROCARDIOGRAM TRACING: CPT

## 2020-03-25 PROCEDURE — 81001 URINALYSIS AUTO W/SCOPE: CPT | Performed by: EMERGENCY MEDICINE

## 2020-03-25 PROCEDURE — 82948 REAGENT STRIP/BLOOD GLUCOSE: CPT

## 2020-03-25 PROCEDURE — 99284 EMERGENCY DEPT VISIT MOD MDM: CPT | Performed by: EMERGENCY MEDICINE

## 2020-03-25 PROCEDURE — 99285 EMERGENCY DEPT VISIT HI MDM: CPT

## 2020-03-25 PROCEDURE — 84484 ASSAY OF TROPONIN QUANT: CPT | Performed by: EMERGENCY MEDICINE

## 2020-03-25 PROCEDURE — 80053 COMPREHEN METABOLIC PANEL: CPT | Performed by: EMERGENCY MEDICINE

## 2020-03-25 PROCEDURE — 96360 HYDRATION IV INFUSION INIT: CPT

## 2020-03-25 PROCEDURE — 85025 COMPLETE CBC W/AUTO DIFF WBC: CPT | Performed by: EMERGENCY MEDICINE

## 2020-03-25 PROCEDURE — 36415 COLL VENOUS BLD VENIPUNCTURE: CPT | Performed by: EMERGENCY MEDICINE

## 2020-03-25 PROCEDURE — 71045 X-RAY EXAM CHEST 1 VIEW: CPT

## 2020-03-25 RX ORDER — MULTIVITAMIN
1 TABLET ORAL DAILY
COMMUNITY

## 2020-03-25 RX ORDER — ACETAMINOPHEN 325 MG/1
975 TABLET ORAL ONCE
Status: COMPLETED | OUTPATIENT
Start: 2020-03-25 | End: 2020-03-25

## 2020-03-25 RX ORDER — LISINOPRIL 20 MG/1
40 TABLET ORAL DAILY
Status: DISCONTINUED | OUTPATIENT
Start: 2020-03-26 | End: 2020-03-25

## 2020-03-25 RX ORDER — MELOXICAM 15 MG/1
15 TABLET ORAL DAILY
COMMUNITY

## 2020-03-25 RX ORDER — AMLODIPINE BESYLATE 10 MG/1
10 TABLET ORAL ONCE
Status: COMPLETED | OUTPATIENT
Start: 2020-03-25 | End: 2020-03-25

## 2020-03-25 RX ORDER — OXYMETAZOLINE HYDROCHLORIDE 0.05 G/100ML
2 SPRAY NASAL ONCE
Status: COMPLETED | OUTPATIENT
Start: 2020-03-25 | End: 2020-03-25

## 2020-03-25 RX ORDER — LISINOPRIL 20 MG/1
40 TABLET ORAL ONCE
Status: COMPLETED | OUTPATIENT
Start: 2020-03-25 | End: 2020-03-25

## 2020-03-25 RX ADMIN — ACETAMINOPHEN 975 MG: 325 TABLET ORAL at 20:21

## 2020-03-25 RX ADMIN — AMLODIPINE BESYLATE 10 MG: 10 TABLET ORAL at 21:31

## 2020-03-25 RX ADMIN — SODIUM CHLORIDE 1000 ML: 0.9 INJECTION, SOLUTION INTRAVENOUS at 20:57

## 2020-03-25 RX ADMIN — METFORMIN HYDROCHLORIDE 1000 MG: 500 TABLET, FILM COATED ORAL at 21:42

## 2020-03-25 RX ADMIN — LISINOPRIL 40 MG: 20 TABLET ORAL at 21:09

## 2020-03-25 RX ADMIN — OXYMETAZOLINE HYDROCHLORIDE 2 SPRAY: 0.05 SPRAY NASAL at 22:58

## 2020-03-25 NOTE — ED PROVIDER NOTES
----- Message from Celso Kim MD sent at 9/18/2019  7:12 AM CDT -----  Labs are all normal.  Patient had canceled appointments earlier   Pt Name: Alyson Stern  MRN: [id number]  [place] 1960  Age/Sex: 63 y o  male  Date of evaluation: 3/25/2020  PCP: Vonnie Youngblood, [address] [place] [address]    Chief Complaint   Patient presents with    Weakness - Generalized     Pt reports having weakness, SOB, loss of appetite, cough, and just does not feel good  HPI    63 y o  male presenting with generalized weakness, sob, loss of appetite, cough over the past 7-10 days  These symptoms are most prominent while walking up stairs or otherwise exerting himself  The cough is frequent and productive of small amount of sputum  He denies trauma or fever at any time  Pt notes not taking his blood pressure medicine over the past 2 days  Patient does note traveling to [place] over the past week to help a friend  HPI      Past Medical and Surgical History    Past Medical History:   Diagnosis Date    Diabetes mellitus ([facility] [address] )     Hyperlipidemia     Hypertension        Past Surgical History:   Procedure Laterality Date    CARPAL TUNNEL RELEASE      CYST REMOVAL      EYE SURGERY      LEG SURGERY      shrapnel removal    ROTATOR CUFF REPAIR Right     ULNAR NERVE TRANSPOSITION         Family History   Adopted: Yes       Social History     Tobacco Use    Smoking status: Former Smoker     Types: Cigars    Smokeless tobacco: Current User     Types: Chew   Substance Use Topics    Alcohol use: Yes     Comment: hx of    Drug use: No           Allergies    Allergies   Allergen Reactions    Pravastatin GI Intolerance       Home Medications    Prior to Admission medications    Medication Sig Start Date End Date Taking?  Authorizing Provider   albuterol (PROVENTIL HFA,VENTOLIN HFA) 90 mcg/act inhaler Inhale 2 puffs every 6 (six) hours as needed 5/22/19 5/21/20  Historical Provider, MD   amLODIPine (NORVASC) 10 mg tablet Take 1 tablet by mouth Daily 6/28/19   Historical Provider, MD   atorvastatin (LIPITOR) 40 mg tablet TAKE 1 TABLET BY MOUTH NIGHTLY 6/28/19   Historical Provider, MD   benazepril (LOTENSIN) 40 MG tablet Take 1 tablet by mouth daily 6/28/19   Historical Provider, MD   Ertugliflozin-metFORMIN HCl 2 5-1000 MG TABS Take 2 5 mg by mouth 2 (two) times a day 10/24/19   Historical Provider, MD   gabapentin (NEURONTIN) 100 mg capsule TAKE ONE CAPSULE BY MOUTH AT BEDTIME 1/15/20   Savannah Elizabeth MD   glipiZIDE (GLUCOTROL) 10 mg tablet Take 10 mg by mouth 2 (two) times a day before meals 12/5/18   Historical Provider, MD   ibuprofen (MOTRIN) 800 mg tablet Take 1 tablet by mouth 3 (three) times a day    Historical Provider, MD   metFORMIN (GLUCOPHAGE) 1000 MG tablet Take 1,000 mg by mouth 2 (two) times a day    Historical Provider, MD           Review of Systems    Review of Systems   Constitutional: Positive for fatigue  Negative for appetite change, chills and diaphoresis  HENT: Negative for drooling, facial swelling, trouble swallowing and voice change  Respiratory: Positive for cough and shortness of breath  Negative for apnea and wheezing  Cardiovascular: Negative for chest pain and leg swelling  Gastrointestinal: Negative for abdominal distention, abdominal pain, diarrhea, nausea and vomiting  Genitourinary: Negative for dysuria and urgency  Musculoskeletal: Negative for arthralgias, back pain, gait problem and neck pain  Skin: Negative for color change, rash and wound  Neurological: Positive for weakness  Negative for seizures, speech difficulty and headaches  Psychiatric/Behavioral: Negative for agitation, behavioral problems and dysphoric mood  The patient is not nervous/anxious  All other systems reviewed and negative      Physical Exam      ED Triage Vitals [03/25/20 1949]   Temperature Pulse Respirations Blood Pressure SpO2   97 6 °F (36 4 °C) 75 18 (!) 238/119 98 %      Temp Source Heart Rate Source Patient Position - Orthostatic VS BP Location FiO2 (%)   Oral Monitor Sitting Right arm --      Pain Score       -- Physical Exam   Constitutional: He is oriented to person, place, and time  He appears well-developed and well-nourished  HENT:   Head: Normocephalic and atraumatic  Nose: Nose normal    Eyes: Pupils are equal, round, and reactive to light  Conjunctivae and EOM are normal    Neck: Normal range of motion  Neck supple  No tracheal deviation present  Cardiovascular: Normal rate, regular rhythm, normal heart sounds and intact distal pulses  No murmur heard  Pulmonary/Chest: Effort normal and breath sounds normal  No stridor  No respiratory distress  He has no wheezes  He has no rales  Abdominal: Soft  He exhibits no distension  There is no tenderness  There is no rebound and no guarding  Musculoskeletal: Normal range of motion  He exhibits no edema or deformity  Neurological: He is alert and oriented to person, place, and time  Skin: Skin is warm and dry  No rash noted  Psychiatric: He has a normal mood and affect  His behavior is normal  Judgment and thought content normal    Nursing note and vitals reviewed  Diagnostic Results  EKG Interpretation    Rate:  74  BPM  Rhythm:  Normal Sinus Rhythm   Axis:  Normal   Intervals: Normal, no blocks, QTc  417 ms  Q waves:  No pathologic Q waves   T waves:  Normal   ST segments:  No significant elevations or depressions     Impression:  Normal sinus rhythm without evidence of acute ischemia or significant arrhythmia      EKG for comparison: ECG dated 5 Dec 2018 similar in character with no major changes    EKG interpreted by me         Labs:    Results Reviewed     Procedure Component Value Units Date/Time    Urine Microscopic [[id number]]  (Normal) Collected:  03/25/20 2156    Lab Status:  Final result Specimen:  Urine, Other Updated:  03/25/20 2214     RBC, UA None Seen /hpf      WBC, UA None Seen /hpf      Epithelial Cells Occasional /hpf      Bacteria, UA None Seen /hpf     Fingerstick Glucose (POCT) [[id number]]  (Abnormal) Collected: 03/25/20 2210    Lab Status:  Final result Updated:  03/25/20 2211     POC Glucose 230 mg/dl     UA (URINE) with reflex to Scope [[id number]]  (Abnormal) Collected:  03/25/20 2156    Lab Status:  Final result Specimen:  Urine, Other Updated:  03/25/20 2202     Color, UA Light Yellow     Clarity, UA Clear     Specific Gravity, UA <=1 005     pH, UA 7 0     Leukocytes, UA Elevated glucose may cause decreased leukocyte values   See urine microscopic for [facility] result/     Nitrite, UA Negative     Protein, UA Negative mg/dl      Glucose, UA >=1000 (1%) mg/dl      Ketones, UA Negative mg/dl      Urobilinogen, UA 0 2 E U /dl      Bilirubin, UA Negative     Blood, UA Negative    Troponin I [[id number]]  (Normal) Collected:  03/25/20 2009    Lab Status:  Final result Specimen:  Blood from Arm, Right Updated:  03/25/20 2035     Troponin I <0 02 ng/mL     Comprehensive metabolic panel [[id number]]  (Abnormal) Collected:  03/25/20 2009    Lab Status:  Final result Specimen:  Blood from Arm, Right Updated:  03/25/20 2033     Sodium 137 mmol/L      Potassium 3 7 mmol/L      Chloride 99 mmol/L      CO2 28 mmol/L      ANION GAP 10 mmol/L      BUN 17 mg/dL      Creatinine 0 83 mg/dL      Glucose 379 mg/dL      Calcium 9 1 mg/dL      AST 28 U/L      ALT 65 U/L      Alkaline Phosphatase 59 U/L      Total Protein 7 9 g/dL      Albumin 4 1 g/dL      Total Bilirubin 0 40 mg/dL      eGFR 97 ml/min/1 73sq m     Narrative:       [place] guidelines for Chronic Kidney Disease (CKD):     Stage 1 with normal or high GFR (GFR > 90 mL/min/1 73 square meters)    Stage 2 Mild CKD (GFR = 60-89 mL/min/1 73 square meters)    Stage 3A Moderate CKD (GFR = 45-59 mL/min/1 73 square meters)    Stage 3B Moderate CKD (GFR = 30-44 mL/min/1 73 square meters)    Stage 4 Severe CKD (GFR = 15-29 mL/min/1 73 square meters)    Stage 5 End Stage CKD (GFR <15 mL/min/1 73 square meters)  Note: GFR calculation is accurate only with a steady state creatinine    CBC and differential [[id number]] Collected:  03/25/20 2009    Lab Status:  Final result Specimen:  Blood from Arm, Right Updated:  03/25/20 2017     WBC 8 02 Thousand/uL      RBC 5 11 Million/uL      Hemoglobin 15 1 g/dL      Hematocrit 43 4 %      MCV 85 fL      MCH 29 5 pg      MCHC 34 8 g/dL      RDW 13 2 %      MPV 10 5 fL      Platelets 984 Thousands/uL      nRBC 0 /100 WBCs      Neutrophils Relative 60 %      Immat GRANS % 0 %      Lymphocytes Relative 28 %      Monocytes Relative 10 %      Eosinophils Relative 2 %      Basophils Relative 0 %      Neutrophils Absolute 4 81 Thousands/µL      Immature Grans Absolute 0 02 Thousand/uL      Lymphocytes Absolute 2 23 Thousands/µL      Monocytes Absolute 0 79 Thousand/µL      Eosinophils Absolute 0 14 Thousand/µL      Basophils Absolute 0 03 Thousands/µL           All labs reviewed and utilized in the medical decision making process    Radiology:    XR chest 1 view portable   Final Result      No focal consolidation  Workstation performed: [id number]             All radiology studies independently viewed by me and interpreted by the radiologist     Procedure    Procedures        ED Course of Care and Re-Assessments      Given home antihypertensives and tylenol and fluids with improvement of hypertension, hyperglycemia, and symptoms    Medications   acetaminophen (TYLENOL) tablet 975 mg (975 mg Oral Given 3/25/20 2021)   amLODIPine (NORVASC) tablet 10 mg (10 mg Oral Given 3/25/20 2131)   sodium chloride 0 9 % bolus 1,000 mL (0 mL Intravenous Stopped 3/25/20 2207)   lisinopril (ZESTRIL) tablet 40 mg (40 mg Oral Given 3/25/20 2109)   metFORMIN (GLUCOPHAGE) tablet 1,000 mg (1,000 mg Oral Given 3/25/20 2142)           FINAL IMPRESSION    Final diagnoses:   Cough   Dehydration   Hypertension   Hyperglycemia due to type 2 diabetes mellitus ([facility] )         DISPOSITION/PLAN    Presentation as above with multiple symptoms    Hyperglycemia and hypertension likely 2/2 medication noncompliance  Weakness felt likely due to dehydration, possibly secondary to hyperglycemia  DNS bacterial pna, sepsis, meningitis, pneumothorax, other acute life threat at this time  COVID-19 considered and patient counseled regarding that possibility, as well as appropriate precautions and need for self quarantine  Testing would not  at this time and not performed - this was discussed with patient and he verbalized understanding  D/c w/ srp, f/u w/ pcp  Time reflects when diagnosis was documented in both MDM as applicable and the Disposition within this note     Time User Action Codes Description Comment    3/25/2020 10:20 PM Irais French Camp Add [R05] Cough     3/25/2020 10:20 PM Irais [place] Add [E86 0] Dehydration     3/25/2020 10:20 PM Irais French [place] Add [I10] Hypertension     3/25/2020 10:20 PM Irais [place] Add [E11 65] Hyperglycemia due to type 2 diabetes mellitus [facility])       ED Disposition     ED Disposition Condition Date/Time Comment    Discharge Stable Wed Mar 25, 2020 10:20 PM Jessica Danielle discharge to home/self care  Follow-up Information     Follow up With Specialties Details Why Contact Info Additional [address] [facility] Emergency Department Emergency Medicine Go to  If symptoms worsen [address] [facility] [address] ED, [address] [place], [place], [facility] Call in 1 day To discuss this visit and further care  We recommend close followup within the next few days   [address]:    [facility]  [address] [facility] [id number] [phone]  Go to   If symptoms worsen    Nieves Bah DO  [address] [facility]  [phone]    Call in 1 day  To discuss this visit and further care  We recommend close followup within the next few days  DISCHARGE MEDICATIONS:    Patient's Medications   Discharge Prescriptions    No medications on file       No discharge procedures on file           MD Felice Person MD  03/25/20 1997

## 2020-03-26 ENCOUNTER — TELEPHONE (OUTPATIENT)
Dept: RADIOLOGY | Facility: HOSPITAL | Age: 60
End: 2020-03-26

## 2020-03-26 NOTE — ED NOTES
Patient reports he does not like where his current IV is located and requesting that it be moved to somewhere else          Dolores Freed RN  03/25/20 5018

## 2020-03-26 NOTE — TELEPHONE ENCOUNTER
Patient has elected to keep his MRI appointment  The patient denies any fevers, chills, SOB/dyspnea, cough, or additional acute symptoms  The patient has also not traveled recently,and is unsure if he has  been in contact with anyone with recent travel, and has not had any contact with anyone who has been sick, or diagnosed with COVID -19  T    he patient has been asked to contact his PCP or  Radiology directly before coming to the hospital if he develops any of the above symptoms  We have asked him to take his temperature prior to coming for the appointment, and to call us if it is equal to or above 100 4  Prior to coming to the hospital   The patient verbalizes his understanding of the above and concurred

## 2020-03-26 NOTE — ED NOTES
Waiting on second medication from DINORA [address] [address], [address] [facility]  03/25/20 6776

## 2020-03-26 NOTE — DISCHARGE INSTRUCTIONS
Take your medications as previously prescribed and keep a log of your blood pressure - measure your blood pressure twice per day and show the log to your primary care doctor  [address]    Your healthcare provider and/or public health staff have evaluated you and have determined that you do not need to remain in the hospital at this time  At this time you can be isolated at home where you will be monitored by staff from your local or state health department  You should carefully follow the prevention and isolation steps below until a healthcare provider or local or state health department says that you can return to your normal activities  Stay home except to get medical care    People who are mildly ill with COVID-19 are able to isolate at home during their illness  You should restrict activities outside your home, except for getting medical care  Do not go to work, school, or public areas  Avoid using public transportation, ride-sharing, or taxis  Separate yourself from other people and animals in your home    People: As much as possible, you should stay in a specific room and away from other people in your home  Also, you should use a separate bathroom, if available  Animals: You should restrict contact with pets and other animals while you are sick with COVID-19, just like you would around other people  Although there have not been reports of pets or other animals becoming sick with COVID-19, it is still recommended that people sick with COVID-19 limit contact with animals until more information is known about the virus  When possible, have another member of your household care for your animals while you are sick  If you are sick with COVID-19, avoid contact with your pet, including petting, snuggling, being kissed or licked, and sharing food   If you must care for your pet or be around animals while you are sick, wash your hands before and after you interact with pets and wear a facemask  See COVID-19 and Animals for more information  Call ahead before visiting your doctor    If you have a medical appointment, call the healthcare provider and tell them that you have or may have COVID-19  This will help the healthcare providers office take steps to keep other people from getting infected or exposed  Wear a facemask    You should wear a facemask when you are around other people (e g , sharing a room or vehicle) or pets and before you enter a healthcare providers office  If you are not able to wear a facemask (for example, because it causes trouble breathing), then people who live with you should not stay in the same room with you, or they should wear a facemask if they enter your room  Cover your coughs and sneezes    Cover your mouth and nose with a tissue when you cough or sneeze  Throw used tissues in a lined trash can  Immediately wash your hands with soap and water for at least 20 seconds or, if soap and water are not available, clean your hands with an alcohol-based hand  that contains at least 60% alcohol  Clean your hands often    Wash your hands often with soap and water for at least 20 seconds, especially after blowing your nose, coughing, or sneezing; going to the bathroom; and before eating or preparing food  If soap and water are not readily available, use an alcohol-based hand  with at least 60% alcohol, covering all surfaces of your hands and rubbing them together until they feel dry  Soap and water are the best option if hands are visibly dirty  Avoid touching your eyes, nose, and mouth with unwashed hands  Avoid sharing personal household items    You should not share dishes, drinking glasses, cups, eating utensils, towels, or bedding with other people or pets in your home  After using these items, they should be washed thoroughly with soap and water      Clean all high-touch surfaces everyday    High touch surfaces include counters, tabletops, doorknobs, bathroom fixtures, toilets, phones, keyboards, tablets, and bedside tables  Also, clean any surfaces that may have blood, stool, or body fluids on them  Use a household cleaning spray or wipe, according to the label instructions  Labels contain instructions for safe and effective use of the cleaning product including precautions you should take when applying the product, such as wearing gloves and making sure you have good ventilation during use of the product  Monitor your symptoms    Seek prompt medical attention if your illness is worsening (e g , difficulty breathing)  Before seeking care, call your healthcare provider and tell them that you have, or are being evaluated for, COVID-19  Put on a facemask before you enter the facility  These steps will help the healthcare providers office to keep other people in the office or waiting room from getting infected or exposed  Ask your healthcare provider to call the local or state health department  Persons who are placed under active monitoring or facilitated self-monitoring should follow instructions provided by their local health department or occupational health professionals, as appropriate  If you have a medical emergency and need to call 911, notify the dispatch personnel that you have, or are being evaluated for COVID-19  If possible, put on a facemask before emergency medical services arrive  Discontinuing home isolation    Patients with confirmed COVID-19 should remain under home isolation precautions until the risk of secondary transmission to others is thought to be low  The decision to discontinue home isolation precautions should be made on a case-by-case basis, in consultation with healthcare providers and state and local health departments      Source: [employer]

## 2020-03-26 NOTE — ED NOTES
Patient requested to give a urine, pt reports at this time he is unable to give a sample  Pt provided with a drink       Lev Oviedo, SHERIE  03/25/20 1076

## 2020-03-27 ENCOUNTER — TELEPHONE (OUTPATIENT)
Dept: NEUROLOGY | Facility: CLINIC | Age: 60
End: 2020-03-27

## 2020-03-27 ENCOUNTER — HOSPITAL ENCOUNTER (OUTPATIENT)
Dept: MRI IMAGING | Facility: CLINIC | Age: 60
Discharge: HOME | End: 2020-03-27
Attending: PSYCHIATRY & NEUROLOGY
Payer: COMMERCIAL

## 2020-03-27 DIAGNOSIS — R93.89 ABNORMAL MRI: Primary | ICD-10-CM

## 2020-03-27 DIAGNOSIS — M54.12 CERVICAL RADICULOPATHY: ICD-10-CM

## 2020-03-27 PROCEDURE — 72156 MRI NECK SPINE W/O & W/DYE: CPT

## 2020-03-27 PROCEDURE — A9585 GADOBUTROL INJECTION: HCPCS | Performed by: PSYCHIATRY & NEUROLOGY

## 2020-03-27 RX ADMIN — GADOBUTROL 9 ML: 604.72 INJECTION INTRAVENOUS at 14:38

## 2020-03-27 NOTE — TELEPHONE ENCOUNTER
Discussed with patient MRI of the C-spine results, he has an appointment to see Dr Angelita Vazquez next week, he denies any MS symptoms, depending on Dr Angelita Vazquez evaluation will decide further management and he will call me after that

## 2020-05-17 ENCOUNTER — APPOINTMENT (EMERGENCY)
Dept: CT IMAGING | Facility: HOSPITAL | Age: 60
End: 2020-05-17
Payer: COMMERCIAL

## 2020-05-17 ENCOUNTER — HOSPITAL ENCOUNTER (EMERGENCY)
Facility: HOSPITAL | Age: 60
End: 2020-05-18
Attending: EMERGENCY MEDICINE
Payer: COMMERCIAL

## 2020-05-17 ENCOUNTER — APPOINTMENT (EMERGENCY)
Dept: RADIOLOGY | Facility: HOSPITAL | Age: 60
End: 2020-05-17
Payer: COMMERCIAL

## 2020-05-17 DIAGNOSIS — R20.2 PARESTHESIA OF RIGHT ARM: ICD-10-CM
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
## 2020-05-17 DIAGNOSIS — G95.20 CERVICAL SPINAL CORD COMPRESSION (HCC): ICD-10-CM

## 2020-05-17 DIAGNOSIS — M25.511 RIGHT SHOULDER PAIN: Primary | ICD-10-CM

## 2020-05-17 PROCEDURE — 72125 CT NECK SPINE W/O DYE: CPT

## 2020-05-17 PROCEDURE — 96374 THER/PROPH/DIAG INJ IV PUSH: CPT

## 2020-05-17 PROCEDURE — 73060 X-RAY EXAM OF HUMERUS: CPT

## 2020-05-17 PROCEDURE — 99285 EMERGENCY DEPT VISIT HI MDM: CPT | Performed by: PHYSICIAN ASSISTANT

## 2020-05-17 PROCEDURE — 99285 EMERGENCY DEPT VISIT HI MDM: CPT

## 2020-05-17 PROCEDURE — 73030 X-RAY EXAM OF SHOULDER: CPT

## 2020-05-17 PROCEDURE — 73200 CT UPPER EXTREMITY W/O DYE: CPT

## 2020-05-17 RX ORDER — KETOROLAC TROMETHAMINE 30 MG/ML
30 INJECTION, SOLUTION INTRAMUSCULAR; INTRAVENOUS ONCE
Status: DISCONTINUED | OUTPATIENT
Start: 2020-05-17 | End: 2020-05-17

## 2020-05-17 RX ORDER — KETOROLAC TROMETHAMINE 30 MG/ML
30 INJECTION, SOLUTION INTRAMUSCULAR; INTRAVENOUS ONCE
Status: COMPLETED | OUTPATIENT
Start: 2020-05-17 | End: 2020-05-17

## 2020-05-17 RX ORDER — DIAZEPAM 5 MG/1
10 TABLET ORAL ONCE
Status: COMPLETED | OUTPATIENT
Start: 2020-05-17 | End: 2020-05-17

## 2020-05-17 RX ADMIN — KETOROLAC TROMETHAMINE 30 MG: 30 INJECTION, SOLUTION INTRAMUSCULAR at 20:44

## 2020-05-17 RX ADMIN — DIAZEPAM 10 MG: 5 TABLET ORAL at 20:48

## 2020-05-18 ENCOUNTER — HOSPITAL ENCOUNTER (OUTPATIENT)
Facility: HOSPITAL | Age: 60
Setting detail: OBSERVATION
Discharge: HOME | End: 2020-05-18
Attending: SURGERY | Admitting: SURGERY
Payer: COMMERCIAL

## 2020-05-18 ENCOUNTER — APPOINTMENT (OUTPATIENT)
Dept: RADIOLOGY | Facility: HOSPITAL | Age: 60
End: 2020-05-18
Payer: COMMERCIAL

## 2020-05-18 ENCOUNTER — PREP FOR PROCEDURE (OUTPATIENT)
Dept: GASTROENTEROLOGY | Facility: CLINIC | Age: 60
End: 2020-05-18

## 2020-05-18 VITALS
HEIGHT: 70 IN | HEART RATE: 102 BPM | WEIGHT: 210 LBS | RESPIRATION RATE: 17 BRPM | BODY MASS INDEX: 30.06 KG/M2 | OXYGEN SATURATION: 94 % | DIASTOLIC BLOOD PRESSURE: 94 MMHG | TEMPERATURE: 101 F | SYSTOLIC BLOOD PRESSURE: 183 MMHG

## 2020-05-18 VITALS
WEIGHT: 209.44 LBS | TEMPERATURE: 98.5 F | DIASTOLIC BLOOD PRESSURE: 95 MMHG | OXYGEN SATURATION: 93 % | HEART RATE: 69 BPM | SYSTOLIC BLOOD PRESSURE: 173 MMHG | RESPIRATION RATE: 18 BRPM | BODY MASS INDEX: 30.05 KG/M2

## 2020-05-18 DIAGNOSIS — K22.9 ABNORMALITY OF ESOPHAGUS: Primary | ICD-10-CM

## 2020-05-18 DIAGNOSIS — Z12.11 COLON CANCER SCREENING: Primary | ICD-10-CM

## 2020-05-18 DIAGNOSIS — K22.89 ESOPHAGEAL THICKENING: ICD-10-CM

## 2020-05-18 DIAGNOSIS — M48.02 CERVICAL STENOSIS OF SPINAL CANAL: ICD-10-CM
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
## 2020-05-18 PROBLEM — G95.20 CORD COMPRESSION (HCC): Status: ACTIVE | Noted: 2020-05-18

## 2020-05-18 LAB
ANION GAP SERPL CALCULATED.3IONS-SCNC: 5 MMOL/L (ref 4–13)
BASOPHILS # BLD AUTO: 0.06 THOUSANDS/ΜL (ref 0–0.1)
BASOPHILS NFR BLD AUTO: 1 % (ref 0–1)
BUN SERPL-MCNC: 15 MG/DL (ref 5–25)
CALCIUM SERPL-MCNC: 8.8 MG/DL (ref 8.3–10.1)
CHLORIDE SERPL-SCNC: 108 MMOL/L (ref 100–108)
CO2 SERPL-SCNC: 26 MMOL/L (ref 21–32)
CREAT SERPL-MCNC: 0.81 MG/DL (ref 0.6–1.3)
EOSINOPHIL # BLD AUTO: 0.24 THOUSAND/ΜL (ref 0–0.61)
EOSINOPHIL NFR BLD AUTO: 3 % (ref 0–6)
ERYTHROCYTE [DISTWIDTH] IN BLOOD BY AUTOMATED COUNT: 13.3 % (ref 11.6–15.1)
GFR SERPL CREATININE-BSD FRML MDRD: 98 ML/MIN/1.73SQ M
GLUCOSE P FAST SERPL-MCNC: 158 MG/DL (ref 65–99)
GLUCOSE SERPL-MCNC: 158 MG/DL (ref 65–140)
HCT VFR BLD AUTO: 42.1 % (ref 36.5–49.3)
HGB BLD-MCNC: 14.5 G/DL (ref 12–17)
IMM GRANULOCYTES # BLD AUTO: 0.02 THOUSAND/UL (ref 0–0.2)
IMM GRANULOCYTES NFR BLD AUTO: 0 % (ref 0–2)
LYMPHOCYTES # BLD AUTO: 2.65 THOUSANDS/ΜL (ref 0.6–4.47)
LYMPHOCYTES NFR BLD AUTO: 35 % (ref 14–44)
MCH RBC QN AUTO: 29.7 PG (ref 26.8–34.3)
MCHC RBC AUTO-ENTMCNC: 34.4 G/DL (ref 31.4–37.4)
MCV RBC AUTO: 86 FL (ref 82–98)
MONOCYTES # BLD AUTO: 0.87 THOUSAND/ΜL (ref 0.17–1.22)
MONOCYTES NFR BLD AUTO: 11 % (ref 4–12)
NEUTROPHILS # BLD AUTO: 3.8 THOUSANDS/ΜL (ref 1.85–7.62)
NEUTS SEG NFR BLD AUTO: 50 % (ref 43–75)
NRBC BLD AUTO-RTO: 0 /100 WBCS
PLATELET # BLD AUTO: 229 THOUSANDS/UL (ref 149–390)
PMV BLD AUTO: 10.9 FL (ref 8.9–12.7)
POTASSIUM SERPL-SCNC: 3.7 MMOL/L (ref 3.5–5.3)
RBC # BLD AUTO: 4.88 MILLION/UL (ref 3.88–5.62)
SARS-COV-2 RNA RESP QL NAA+PROBE: NEGATIVE
SODIUM SERPL-SCNC: 139 MMOL/L (ref 136–145)
WBC # BLD AUTO: 7.64 THOUSAND/UL (ref 4.31–10.16)

## 2020-05-18 PROCEDURE — NC001 PR NO CHARGE: Performed by: NURSE PRACTITIONER

## 2020-05-18 PROCEDURE — NC001 PR NO CHARGE: Performed by: EMERGENCY MEDICINE

## 2020-05-18 PROCEDURE — 99242 OFF/OP CONSLTJ NEW/EST SF 20: CPT | Performed by: NEUROLOGICAL SURGERY

## 2020-05-18 PROCEDURE — 99235 HOSP IP/OBS SAME DATE MOD 70: CPT | Performed by: SURGERY

## 2020-05-18 PROCEDURE — 99244 OFF/OP CNSLTJ NEW/EST MOD 40: CPT | Performed by: INTERNAL MEDICINE

## 2020-05-18 PROCEDURE — U0003 INFECTIOUS AGENT DETECTION BY NUCLEIC ACID (DNA OR RNA); SEVERE ACUTE RESPIRATORY SYNDROME CORONAVIRUS 2 (SARS-COV-2) (CORONAVIRUS DISEASE [COVID-19]), AMPLIFIED PROBE TECHNIQUE, MAKING USE OF HIGH THROUGHPUT TECHNOLOGIES AS DESCRIBED BY CMS-2020-01-R: HCPCS | Performed by: INTERNAL MEDICINE

## 2020-05-18 PROCEDURE — 85025 COMPLETE CBC W/AUTO DIFF WBC: CPT | Performed by: EMERGENCY MEDICINE

## 2020-05-18 PROCEDURE — 99285 EMERGENCY DEPT VISIT HI MDM: CPT

## 2020-05-18 PROCEDURE — 80048 BASIC METABOLIC PNL TOTAL CA: CPT | Performed by: EMERGENCY MEDICINE

## 2020-05-18 PROCEDURE — 72141 MRI NECK SPINE W/O DYE: CPT

## 2020-05-18 PROCEDURE — 97166 OT EVAL MOD COMPLEX 45 MIN: CPT

## 2020-05-18 PROCEDURE — 97163 PT EVAL HIGH COMPLEX 45 MIN: CPT

## 2020-05-18 RX ORDER — OXYCODONE HYDROCHLORIDE 5 MG/1
5 TABLET ORAL EVERY 4 HOURS PRN
Qty: 15 TABLET | Refills: 0 | Status: SHIPPED | OUTPATIENT
Start: 2020-05-18 | End: 2020-05-28

## 2020-05-18 RX ORDER — ACETAMINOPHEN 325 MG/1
650 TABLET ORAL EVERY 4 HOURS PRN
Qty: 30 TABLET | Refills: 0 | Status: SHIPPED | OUTPATIENT
Start: 2020-05-18 | End: 2020-09-30 | Stop reason: SDDI

## 2020-05-18 RX ORDER — OXYCODONE HYDROCHLORIDE 5 MG/1
5 TABLET ORAL EVERY 4 HOURS PRN
Status: DISCONTINUED | OUTPATIENT
Start: 2020-05-18 | End: 2020-05-18 | Stop reason: HOSPADM

## 2020-05-18 RX ORDER — HYDROMORPHONE HCL/PF 1 MG/ML
0.5 SYRINGE (ML) INJECTION
Status: DISCONTINUED | OUTPATIENT
Start: 2020-05-18 | End: 2020-05-18 | Stop reason: HOSPADM

## 2020-05-18 RX ORDER — OXYCODONE HYDROCHLORIDE 10 MG/1
10 TABLET ORAL EVERY 4 HOURS PRN
Status: DISCONTINUED | OUTPATIENT
Start: 2020-05-18 | End: 2020-05-18 | Stop reason: HOSPADM

## 2020-05-18 RX ORDER — HYDROCODONE BITARTRATE AND ACETAMINOPHEN 5; 325 MG/1; MG/1
1 TABLET ORAL ONCE
Status: COMPLETED | OUTPATIENT
Start: 2020-05-18 | End: 2020-05-18

## 2020-05-18 RX ORDER — ACETAMINOPHEN 325 MG/1
650 TABLET ORAL EVERY 4 HOURS PRN
Status: DISCONTINUED | OUTPATIENT
Start: 2020-05-18 | End: 2020-05-18 | Stop reason: HOSPADM

## 2020-05-18 RX ORDER — AMLODIPINE BESYLATE 10 MG/1
10 TABLET ORAL DAILY
Status: DISCONTINUED | OUTPATIENT
Start: 2020-05-18 | End: 2020-05-18 | Stop reason: HOSPADM

## 2020-05-18 RX ORDER — ONDANSETRON 2 MG/ML
4 INJECTION INTRAMUSCULAR; INTRAVENOUS EVERY 4 HOURS PRN
Status: DISCONTINUED | OUTPATIENT
Start: 2020-05-18 | End: 2020-05-18 | Stop reason: HOSPADM

## 2020-05-18 RX ORDER — MELOXICAM 7.5 MG/1
15 TABLET ORAL DAILY
Status: DISCONTINUED | OUTPATIENT
Start: 2020-05-18 | End: 2020-05-18 | Stop reason: HOSPADM

## 2020-05-18 RX ORDER — GLIPIZIDE 10 MG/1
10 TABLET ORAL
Status: DISCONTINUED | OUTPATIENT
Start: 2020-05-18 | End: 2020-05-18 | Stop reason: HOSPADM

## 2020-05-18 RX ORDER — ALBUTEROL SULFATE 90 UG/1
2 AEROSOL, METERED RESPIRATORY (INHALATION) EVERY 6 HOURS PRN
Status: DISCONTINUED | OUTPATIENT
Start: 2020-05-18 | End: 2020-05-18 | Stop reason: HOSPADM

## 2020-05-18 RX ORDER — GABAPENTIN 100 MG/1
100 CAPSULE ORAL
Status: DISCONTINUED | OUTPATIENT
Start: 2020-05-18 | End: 2020-05-18 | Stop reason: HOSPADM

## 2020-05-18 RX ORDER — LISINOPRIL 20 MG/1
40 TABLET ORAL DAILY
Status: DISCONTINUED | OUTPATIENT
Start: 2020-05-18 | End: 2020-05-18 | Stop reason: HOSPADM

## 2020-05-18 RX ORDER — ATORVASTATIN CALCIUM 40 MG/1
40 TABLET, FILM COATED ORAL EVERY EVENING
Status: DISCONTINUED | OUTPATIENT
Start: 2020-05-18 | End: 2020-05-18 | Stop reason: HOSPADM

## 2020-05-18 RX ADMIN — OXYCODONE HYDROCHLORIDE 10 MG: 10 TABLET ORAL at 15:13

## 2020-05-18 RX ADMIN — AMLODIPINE BESYLATE 10 MG: 10 TABLET ORAL at 15:08

## 2020-05-18 RX ADMIN — LISINOPRIL 40 MG: 20 TABLET ORAL at 15:09

## 2020-05-18 RX ADMIN — METFORMIN HYDROCHLORIDE 1000 MG: 500 TABLET ORAL at 17:14

## 2020-05-18 RX ADMIN — ATORVASTATIN CALCIUM 40 MG: 40 TABLET, FILM COATED ORAL at 17:14

## 2020-05-18 RX ADMIN — HYDROCODONE BITARTRATE AND ACETAMINOPHEN 1 TABLET: 5; 325 TABLET ORAL at 02:25

## 2020-05-19 ENCOUNTER — TELEPHONE (OUTPATIENT)
Dept: NEUROSURGERY | Facility: CLINIC | Age: 60
End: 2020-05-19

## 2020-05-19 ENCOUNTER — TELEPHONE (OUTPATIENT)
Dept: NEUROLOGY | Facility: CLINIC | Age: 60
End: 2020-05-19

## 2020-05-19 DIAGNOSIS — M48.02 CERVICAL SPINAL STENOSIS: Primary | ICD-10-CM

## 2020-05-20 ENCOUNTER — OFFICE VISIT (OUTPATIENT)
Dept: NEUROLOGY | Facility: CLINIC | Age: 60
End: 2020-05-20
Payer: COMMERCIAL

## 2020-05-20 VITALS
SYSTOLIC BLOOD PRESSURE: 178 MMHG | BODY MASS INDEX: 29.92 KG/M2 | HEIGHT: 70 IN | WEIGHT: 209 LBS | DIASTOLIC BLOOD PRESSURE: 98 MMHG | HEART RATE: 84 BPM

## 2020-05-20 DIAGNOSIS — M54.12 CERVICAL RADICULOPATHY: Primary | ICD-10-CM

## 2020-05-20 DIAGNOSIS — M48.02 CERVICAL STENOSIS OF SPINAL CANAL: ICD-10-CM

## 2020-05-20 DIAGNOSIS — G95.20 CORD COMPRESSION (HCC): ICD-10-CM

## 2020-05-20 PROBLEM — G56.21 ULNAR NEUROPATHY OF RIGHT UPPER EXTREMITY: Status: ACTIVE | Noted: 2020-05-20

## 2020-05-20 PROCEDURE — 99214 OFFICE O/P EST MOD 30 MIN: CPT | Performed by: PSYCHIATRY & NEUROLOGY

## 2020-05-21 ENCOUNTER — EVALUATION (OUTPATIENT)
Dept: PHYSICAL THERAPY | Facility: CLINIC | Age: 60
End: 2020-05-21
Payer: COMMERCIAL

## 2020-05-21 DIAGNOSIS — M54.12 CERVICAL RADICULOPATHY: Primary | ICD-10-CM

## 2020-05-21 PROCEDURE — 97535 SELF CARE MNGMENT TRAINING: CPT

## 2020-05-21 PROCEDURE — 97161 PT EVAL LOW COMPLEX 20 MIN: CPT

## 2020-05-22 ENCOUNTER — TELEMEDICINE (OUTPATIENT)
Dept: NEUROLOGY | Facility: CLINIC | Age: 60
End: 2020-05-22
Payer: COMMERCIAL

## 2020-05-22 DIAGNOSIS — G56.21 ULNAR NEUROPATHY OF RIGHT UPPER EXTREMITY: ICD-10-CM

## 2020-05-22 DIAGNOSIS — M54.12 CERVICAL RADICULOPATHY: ICD-10-CM

## 2020-05-22 DIAGNOSIS — G95.9 CERVICAL MYELOPATHY (HCC): Primary | ICD-10-CM

## 2020-05-22 DIAGNOSIS — G37.9 CNS DEMYELINATION (HCC): ICD-10-CM

## 2020-05-22 DIAGNOSIS — G95.20 CORD COMPRESSION (HCC): ICD-10-CM

## 2020-05-22 PROCEDURE — G2012 BRIEF CHECK IN BY MD/QHP: HCPCS | Performed by: PSYCHIATRY & NEUROLOGY

## 2020-05-22 RX ORDER — OXYCODONE HYDROCHLORIDE AND ACETAMINOPHEN 5; 325 MG/1; MG/1
1 TABLET ORAL 2 TIMES DAILY
Qty: 2 TABLET | Refills: 0 | Status: SHIPPED | OUTPATIENT
Start: 2020-05-22 | End: 2020-09-30 | Stop reason: SINTOL

## 2020-05-22 RX ORDER — DIAZEPAM 5 MG/1
TABLET ORAL
Qty: 2 TABLET | Refills: 0 | Status: SHIPPED | OUTPATIENT
Start: 2020-05-22

## 2020-05-27 ENCOUNTER — OFFICE VISIT (OUTPATIENT)
Dept: PHYSICAL THERAPY | Facility: CLINIC | Age: 60
End: 2020-05-27
Payer: COMMERCIAL

## 2020-05-27 DIAGNOSIS — M54.12 CERVICAL RADICULOPATHY: Primary | ICD-10-CM

## 2020-05-27 PROCEDURE — 97110 THERAPEUTIC EXERCISES: CPT

## 2020-05-27 PROCEDURE — 97035 APP MDLTY 1+ULTRASOUND EA 15: CPT

## 2020-05-27 PROCEDURE — 97014 ELECTRIC STIMULATION THERAPY: CPT

## 2020-05-29 ENCOUNTER — OFFICE VISIT (OUTPATIENT)
Dept: PHYSICAL THERAPY | Facility: CLINIC | Age: 60
End: 2020-05-29
Payer: COMMERCIAL

## 2020-05-29 DIAGNOSIS — M54.12 CERVICAL RADICULOPATHY: Primary | ICD-10-CM

## 2020-05-29 PROCEDURE — 97035 APP MDLTY 1+ULTRASOUND EA 15: CPT

## 2020-05-29 PROCEDURE — 97110 THERAPEUTIC EXERCISES: CPT

## 2020-05-29 PROCEDURE — 97014 ELECTRIC STIMULATION THERAPY: CPT

## 2020-06-01 ENCOUNTER — TELEPHONE (OUTPATIENT)
Dept: NEUROSURGERY | Facility: CLINIC | Age: 60
End: 2020-06-01

## 2020-06-01 ENCOUNTER — OFFICE VISIT (OUTPATIENT)
Dept: PHYSICAL THERAPY | Facility: CLINIC | Age: 60
End: 2020-06-01
Payer: COMMERCIAL

## 2020-06-01 DIAGNOSIS — M54.12 CERVICAL RADICULOPATHY: Primary | ICD-10-CM

## 2020-06-01 PROCEDURE — 97035 APP MDLTY 1+ULTRASOUND EA 15: CPT

## 2020-06-01 PROCEDURE — 97014 ELECTRIC STIMULATION THERAPY: CPT

## 2020-06-01 PROCEDURE — 97110 THERAPEUTIC EXERCISES: CPT

## 2020-06-03 ENCOUNTER — OFFICE VISIT (OUTPATIENT)
Dept: PHYSICAL THERAPY | Facility: CLINIC | Age: 60
End: 2020-06-03
Payer: COMMERCIAL

## 2020-06-03 DIAGNOSIS — M54.12 CERVICAL RADICULOPATHY: Primary | ICD-10-CM

## 2020-06-03 PROCEDURE — 97035 APP MDLTY 1+ULTRASOUND EA 15: CPT

## 2020-06-03 PROCEDURE — 97110 THERAPEUTIC EXERCISES: CPT

## 2020-06-03 PROCEDURE — 97014 ELECTRIC STIMULATION THERAPY: CPT

## 2020-06-04 ENCOUNTER — TELEPHONE (OUTPATIENT)
Dept: NEUROSURGERY | Facility: CLINIC | Age: 60
End: 2020-06-04

## 2020-06-08 ENCOUNTER — OFFICE VISIT (OUTPATIENT)
Dept: PHYSICAL THERAPY | Facility: CLINIC | Age: 60
End: 2020-06-08
Payer: COMMERCIAL

## 2020-06-08 DIAGNOSIS — M54.12 CERVICAL RADICULOPATHY: Primary | ICD-10-CM

## 2020-06-08 PROCEDURE — 97014 ELECTRIC STIMULATION THERAPY: CPT

## 2020-06-08 PROCEDURE — 97035 APP MDLTY 1+ULTRASOUND EA 15: CPT

## 2020-06-08 PROCEDURE — 97110 THERAPEUTIC EXERCISES: CPT

## 2020-06-11 ENCOUNTER — OFFICE VISIT (OUTPATIENT)
Dept: PHYSICAL THERAPY | Facility: CLINIC | Age: 60
End: 2020-06-11
Payer: COMMERCIAL

## 2020-06-11 DIAGNOSIS — M54.12 CERVICAL RADICULOPATHY: Primary | ICD-10-CM

## 2020-06-11 PROCEDURE — 97110 THERAPEUTIC EXERCISES: CPT

## 2020-06-11 PROCEDURE — 97035 APP MDLTY 1+ULTRASOUND EA 15: CPT

## 2020-06-11 PROCEDURE — 97014 ELECTRIC STIMULATION THERAPY: CPT

## 2020-06-15 RX ORDER — HYDROCODONE BITATRATE AND ACETAMINOPHEN 5; 325 MG/1; MG/1
1 TABLET ORAL EVERY 8 HOURS PRN
COMMUNITY

## 2020-06-15 RX ORDER — DULOXETIN HYDROCHLORIDE 30 MG/1
30 CAPSULE, DELAYED RELEASE ORAL DAILY
COMMUNITY

## 2020-06-16 ENCOUNTER — OFFICE VISIT (OUTPATIENT)
Dept: PHYSICAL THERAPY | Facility: CLINIC | Age: 60
End: 2020-06-16
Payer: COMMERCIAL

## 2020-06-16 DIAGNOSIS — M54.12 CERVICAL RADICULOPATHY: Primary | ICD-10-CM

## 2020-06-16 PROCEDURE — 97014 ELECTRIC STIMULATION THERAPY: CPT

## 2020-06-16 PROCEDURE — 97110 THERAPEUTIC EXERCISES: CPT

## 2020-06-16 PROCEDURE — 97035 APP MDLTY 1+ULTRASOUND EA 15: CPT

## 2020-06-17 ENCOUNTER — HOSPITAL ENCOUNTER (OUTPATIENT)
Dept: MRI IMAGING | Facility: CLINIC | Age: 60
Discharge: HOME | End: 2020-06-17
Payer: COMMERCIAL

## 2020-06-17 DIAGNOSIS — G95.9 CERVICAL MYELOPATHY (HCC): ICD-10-CM

## 2020-06-17 DIAGNOSIS — G37.9 CNS DEMYELINATION (HCC): ICD-10-CM

## 2020-06-17 PROCEDURE — 70553 MRI BRAIN STEM W/O & W/DYE: CPT

## 2020-06-17 PROCEDURE — A9585 GADOBUTROL INJECTION: HCPCS | Performed by: PSYCHIATRY & NEUROLOGY

## 2020-06-17 RX ADMIN — GADOBUTROL 9 ML: 604.72 INJECTION INTRAVENOUS at 12:08

## 2020-06-18 ENCOUNTER — TELEPHONE (OUTPATIENT)
Dept: NEUROSURGERY | Facility: CLINIC | Age: 60
End: 2020-06-18

## 2020-06-18 ENCOUNTER — OFFICE VISIT (OUTPATIENT)
Dept: PHYSICAL THERAPY | Facility: CLINIC | Age: 60
End: 2020-06-18
Payer: COMMERCIAL

## 2020-06-18 DIAGNOSIS — M54.12 CERVICAL RADICULOPATHY: Primary | ICD-10-CM

## 2020-06-18 PROCEDURE — 97014 ELECTRIC STIMULATION THERAPY: CPT

## 2020-06-18 PROCEDURE — 97035 APP MDLTY 1+ULTRASOUND EA 15: CPT

## 2020-06-18 PROCEDURE — 97110 THERAPEUTIC EXERCISES: CPT

## 2020-06-19 ENCOUNTER — OFFICE VISIT (OUTPATIENT)
Dept: GASTROENTEROLOGY | Facility: CLINIC | Age: 60
End: 2020-06-19
Payer: COMMERCIAL

## 2020-06-19 VITALS
TEMPERATURE: 98.4 F | HEIGHT: 70 IN | DIASTOLIC BLOOD PRESSURE: 94 MMHG | SYSTOLIC BLOOD PRESSURE: 168 MMHG | BODY MASS INDEX: 30.26 KG/M2 | HEART RATE: 73 BPM | WEIGHT: 211.4 LBS

## 2020-06-19 DIAGNOSIS — Z12.11 SCREENING FOR COLON CANCER: ICD-10-CM

## 2020-06-19 DIAGNOSIS — R93.89 ABNORMAL CT OF THE CHEST: Primary | ICD-10-CM

## 2020-06-19 PROCEDURE — 99213 OFFICE O/P EST LOW 20 MIN: CPT | Performed by: INTERNAL MEDICINE

## 2020-06-22 ENCOUNTER — TELEPHONE (OUTPATIENT)
Dept: NEUROLOGY | Facility: CLINIC | Age: 60
End: 2020-06-22

## 2020-06-25 ENCOUNTER — EVALUATION (OUTPATIENT)
Dept: PHYSICAL THERAPY | Facility: CLINIC | Age: 60
End: 2020-06-25
Payer: COMMERCIAL

## 2020-06-25 ENCOUNTER — TRANSCRIBE ORDERS (OUTPATIENT)
Dept: PHYSICAL THERAPY | Facility: CLINIC | Age: 60
End: 2020-06-25

## 2020-06-25 DIAGNOSIS — M25.511 RIGHT SHOULDER PAIN, UNSPECIFIED CHRONICITY: Primary | ICD-10-CM

## 2020-06-25 DIAGNOSIS — M25.511 ACUTE PAIN OF RIGHT SHOULDER: Primary | ICD-10-CM

## 2020-06-25 PROCEDURE — 97162 PT EVAL MOD COMPLEX 30 MIN: CPT

## 2020-06-25 PROCEDURE — 97014 ELECTRIC STIMULATION THERAPY: CPT

## 2020-06-26 ENCOUNTER — TELEPHONE (OUTPATIENT)
Dept: GASTROENTEROLOGY | Facility: CLINIC | Age: 60
End: 2020-06-26

## 2020-06-26 DIAGNOSIS — Z12.11 COLON CANCER SCREENING: ICD-10-CM

## 2020-06-26 DIAGNOSIS — K22.89 ESOPHAGEAL THICKENING: ICD-10-CM

## 2020-06-29 ENCOUNTER — LAB REQUISITION (OUTPATIENT)
Dept: LAB | Facility: HOSPITAL | Age: 60
End: 2020-06-29
Payer: COMMERCIAL

## 2020-06-29 DIAGNOSIS — K26.9 DUODENAL ULCER, UNSPECIFIED AS ACUTE OR CHRONIC, WITHOUT HEMORRHAGE OR PERFORATION: ICD-10-CM

## 2020-06-29 DIAGNOSIS — B96.81 HELICOBACTER PYLORI (H. PYLORI) AS THE CAUSE OF DISEASES CLASSIFIED ELSEWHERE: ICD-10-CM

## 2020-06-29 PROCEDURE — 88342 IMHCHEM/IMCYTCHM 1ST ANTB: CPT | Performed by: PATHOLOGY

## 2020-06-29 PROCEDURE — 88305 TISSUE EXAM BY PATHOLOGIST: CPT | Performed by: PATHOLOGY

## 2020-06-30 ENCOUNTER — OFFICE VISIT (OUTPATIENT)
Dept: PHYSICAL THERAPY | Facility: CLINIC | Age: 60
End: 2020-06-30
Payer: COMMERCIAL

## 2020-06-30 DIAGNOSIS — M54.12 CERVICAL RADICULOPATHY: ICD-10-CM

## 2020-06-30 DIAGNOSIS — M25.511 RIGHT SHOULDER PAIN, UNSPECIFIED CHRONICITY: Primary | ICD-10-CM

## 2020-06-30 PROCEDURE — 97014 ELECTRIC STIMULATION THERAPY: CPT

## 2020-06-30 PROCEDURE — 97110 THERAPEUTIC EXERCISES: CPT

## 2020-07-02 ENCOUNTER — OFFICE VISIT (OUTPATIENT)
Dept: PHYSICAL THERAPY | Facility: CLINIC | Age: 60
End: 2020-07-02
Payer: COMMERCIAL

## 2020-07-02 DIAGNOSIS — M25.511 RIGHT SHOULDER PAIN, UNSPECIFIED CHRONICITY: Primary | ICD-10-CM

## 2020-07-02 PROCEDURE — 97110 THERAPEUTIC EXERCISES: CPT | Performed by: PHYSICAL THERAPIST

## 2020-07-02 NOTE — PROGRESS NOTES
Daily Note     Today's date: 2020  Patient name: Claudia Barger  : 1960  MRN: [id number]  Referring provider: Emma Hua PA-C  Dx:   Encounter Diagnosis     ICD-10-CM    1  Right shoulder pain, unspecified chronicity M25 511        Start Time: 930  Stop Time: 1035  Total time in clinic (min): 65 minutes    Subjective: Pt reports increased soreness in the right shoulder at the start of treatment  Notes difficulty sleeping over the last few days, with some difficulty noted due to pain in shoulder and medication not taking effect quickly  Objective: See treatment diary below      Assessment: Tolerated treatment well  Patient would benefit from continued PT  Pt with good tolerance to exercises this visit  Decreased soreness noted post tx  IFC held 2* pt report of decreased sensation around the right shoulder contraindicating use of electrical stimulation  Normal sensation noted to differences in hot/cold  Continue to progress as tolerated by the patient  Plan: Continue per plan of care        Precautions:  Hx Right RTC Repair        Manuals 20       Stretching/  PROM shld                                                       Neuro Re-Ed                                          Ther Ex             pendulums   20x  20x       pulleys   20x  25x Flex       shrugs   20x  20x       scap retrac   20x  20x       Bicep curl   20x  20x       tricep ext             Shld isometrics   10x 5" 10x5" ea  Flex, Ext, ER, IR        Bent over row   20x 20x       nustep    L1 10'  L3 10 min       MTP/LTP       NV                                 Ther Activity                                         Gait Training                                         Modalities             IFC/MHP 13'  Scapula/shld  15'  MHP 10 min

## 2020-07-07 ENCOUNTER — OFFICE VISIT (OUTPATIENT)
Dept: PHYSICAL THERAPY | Facility: CLINIC | Age: 60
End: 2020-07-07
Payer: COMMERCIAL

## 2020-07-07 DIAGNOSIS — M25.511 RIGHT SHOULDER PAIN, UNSPECIFIED CHRONICITY: Primary | ICD-10-CM

## 2020-07-07 PROCEDURE — 97140 MANUAL THERAPY 1/> REGIONS: CPT

## 2020-07-07 PROCEDURE — 97110 THERAPEUTIC EXERCISES: CPT

## 2020-07-07 NOTE — PROGRESS NOTES
Daily Note     Today's date: 2020  Patient name: Antonio Escobar  : 1960  MRN: [id number]  Referring provider: Darrian Chase PA-C  Dx:   Encounter Diagnosis     ICD-10-CM    1  Right shoulder pain, unspecified chronicity M25 511                   Subjective: Tomorrow will be my last day  I'm having my neck fused next week    Objective: See treatment diary below      Assessment: Tolerated treatment fair  Pain continues right shoulder with all activity  Reports decreased sensation RUE/shoulder region  To have cervical fusion on 7-15-20  Patient would benefit from continued PT      Plan: Continue per plan of care        Precautions:  Hx Right RTC Repair        Manuals 20     Stretching/  PROM shld        12'                                               Neuro Re-Ed                                          Ther Ex             pendulums   20x  20x       pulleys   20x  25x Flex  25x     shrugs   20x  20x  20x     scap retrac   20x  20x  20x     Bicep curl   20x  20x  20x     tricep ext             Shld isometrics   10x 5" 10x5" ea  Flex, Ext, ER, IR   20x ea  5"     Bent over row   20x 20x       nustep    L1 10'  L3 10 min  L3  10'     MTP/LTP       NV                                 Ther Activity                                         Gait Training                                         Modalities             IFC/MHP 13'  Scapula/shld  15'  MHP 10 min

## 2020-07-08 ENCOUNTER — OFFICE VISIT (OUTPATIENT)
Dept: PHYSICAL THERAPY | Facility: CLINIC | Age: 60
End: 2020-07-08
Payer: COMMERCIAL

## 2020-07-08 DIAGNOSIS — M25.511 RIGHT SHOULDER PAIN, UNSPECIFIED CHRONICITY: Primary | ICD-10-CM

## 2020-07-08 PROCEDURE — 97110 THERAPEUTIC EXERCISES: CPT

## 2020-07-08 PROCEDURE — 97140 MANUAL THERAPY 1/> REGIONS: CPT

## 2020-07-08 NOTE — PROGRESS NOTES
Daily Note     Today's date: 2020  Patient name: Braydon Dotson  : 1960  MRN: [id number]  Referring provider: Radha Adair PA-C  Dx:   Encounter Diagnosis     ICD-10-CM    1  Right shoulder pain, unspecified chronicity M25 511                   Subjective:  I got medical clearance for the surgery  Everything hurts today      Objective: See treatment diary below      Assessment: Tolerated treatment fair  Pt to discontinue PT after this session as he will be having c-spine fusion  Patient would benefit from continued PT      Plan: Continue per plan of care        Precautions:  Hx Right RTC Repair        Manuals 20   Stretching/  PROM shld        12'  15'                                             Neuro Re-Ed                                          Ther Ex             pendulums   20x  20x       pulleys   20x  25x Flex  25x 20x   shrugs   20x  20x  20x  20x   scap retrac   20x  20x  20x  20x   Bicep curl   20x  20x  20x  20x   tricep ext             Shld isometrics   10x 5" 10x5" ea  Flex, Ext, ER, IR   20x ea  5"  20x 5" flex, ext, ER/IR   Bent over row   20x 20x       nustep    L1 10'  L3 10 min  L3  10'  L3  12'   MTP/LTP       NV                                 Ther Activity                                         Gait Training                                         Modalities             IFC/MHP 13'  Scapula/shld  15'  MHP 10 min     MHP 10'

## 2020-07-09 ENCOUNTER — APPOINTMENT (OUTPATIENT)
Dept: PHYSICAL THERAPY | Facility: CLINIC | Age: 60
End: 2020-07-09
Payer: COMMERCIAL

## 2020-07-13 DIAGNOSIS — K22.89 ESOPHAGEAL THICKENING: Primary | ICD-10-CM

## 2020-07-13 RX ORDER — PANTOPRAZOLE SODIUM 40 MG/1
40 TABLET, DELAYED RELEASE ORAL DAILY
Qty: 30 TABLET | Refills: 3 | Status: SHIPPED | OUTPATIENT
Start: 2020-07-13 | End: 2020-09-15 | Stop reason: SDUPTHER

## 2020-07-13 RX ORDER — PANTOPRAZOLE SODIUM 40 MG/1
TABLET, DELAYED RELEASE ORAL
COMMUNITY
End: 2020-07-13 | Stop reason: SDUPTHER

## 2020-07-21 NOTE — PROGRESS NOTES
PT DISCHARGE     Today's date: 2020  Patient name: Chen Cline  : 1960  MRN: [id number]  Referring provider: Melba Kaur PA-C  Dx:   Encounter Diagnosis     ICD-10-CM    1  Right shoulder pain, unspecified chronicity M25 511        Start Time: 1201  Stop Time: 1306  Total time in clinic (min): 65 minutes    Assessment  Assessment details: Pt presented with increased right shoulder pain after falling onto shoulder on 20  Reports X-ray negative but Ortho MD felt he had fracture  Used sling for one week  Hx right RTC repair  Pt attended 5 total PT sessions for shoulder  Reported continued pain with all activity right shoulder  No significant change in status noted  Pt last attended session was on 20  Pt was to have c-spine fusion after that time  D/C PT services  Impairments: abnormal or restricted ROM, activity intolerance, impaired physical strength and lacks appropriate home exercise program     Prognosis: good    Goals  ST  Decrease pain 50% 4 wk  2  Increase shoulder ROM by 20-30 degrees 4 wk  3  Increase shoulder strength to 4/5 4 wk  Pt will report no difficulty with activity below shoulder level 4 wk  LT  Pt will report no pain 8 wk  2  Increase shoulder ROM to WNL 8 wk  3  Increase shoulder strength to WNL 8 wk    Pt will report no limitations with ADL's 8 wk    Plan  Plan details: D/C PT services  Findings per initial evaluation   Patient would benefit from: PT eval and skilled physical therapy  Planned modality interventions: cryotherapy, thermotherapy: hydrocollator packs, ultrasound and unattended electrical stimulation  Planned therapy interventions: joint mobilization, manual therapy, neuromuscular re-education, strengthening, stretching, therapeutic activities, therapeutic exercise, functional ROM exercises, flexibility and home exercise program        Subjective Evaluation    History of Present Illness  Date of onset: 2020  Mechanism of injury: Pt presents with right shoulder pain following fall onto shoulder on May 17, 2020  Hx Right RTC repair on 19  Following injury X-Ray performed performed and pt reports he was told he had a fracture  Was in sling initially for approx one week but has not used for some time  Saw Ortho MD recently and advised to begin PT tx for shoulder  Pain now primarily AC joint region and upper trap  Has intermittent sharp stabbing pain  Pain in deltoid region noted as well  Reports chronic numbness right hand  Pain varies with activity presently  Intermittent "snap/popping" in shoulder per pt  Pain  Current pain ratin  At best pain ratin  At worst pain ratin  Location: Right shoulder   Quality: sharp, dull ache, throbbing and radiating  Relieving factors: medications and rest    Hand dominance: right    Patient Goals  Patient goals for therapy: decreased pain, increased motion, increased strength, independence with ADLs/IADLs and return to sport/leisure activities          Objective     Tenderness     Right Shoulder  Tenderness in the lateral scapula and scapular spine  Additional Tenderness Details  Hypersensitivity to palpation over scapula    Mid and lateral border most symptomatic     Active Range of Motion     Right Shoulder   Flexion: 85 degrees   External rotation BTH: with pain  Internal rotation BTB: L5 with pain    Additional Active Range of Motion Details  Unable to reach to behind head due to pain      Passive Range of Motion     Right Shoulder   External rotation 90°: 30 degrees with pain  Internal rotation 90°: 50 degrees with pain    Strength/Myotome Testing     Left Shoulder     Planes of Motion   Flexion: 4+   Abduction: 4+   External rotation at 0°: 4+   Internal rotation at 0°: 4+     Isolated Muscles   Biceps: 4+   Triceps: 4   Upper trapezius: 4     Right Shoulder     Planes of Motion   Flexion: 4-   Abduction: 3+   External rotation at 0°: 3+   External rotation BTH: 4-     Isolated Muscles   Biceps: 4-   Triceps: 3+   Upper trapezius: 3-     Tests     Right Shoulder   Positive empty can and painful arc  Negative anterior load and shift, drop arm, Hawkin's, posterior load and shift and posterior apprehension          Flowsheet Rows      Most Recent Value   PT/OT G-Codes   Current Score  52   Projected Score  65

## 2020-08-17 ENCOUNTER — TELEPHONE (OUTPATIENT)
Dept: NEUROLOGY | Facility: CLINIC | Age: 60
End: 2020-08-17

## 2020-08-17 NOTE — TELEPHONE ENCOUNTER
Unable to leave message, pt number is unreachable  Trying to reach pt for appointment confirmation for Thurs, 8/20/20 at 9:55am at [facility] with Dr Juju Saba

## 2020-08-18 ENCOUNTER — TELEPHONE (OUTPATIENT)
Dept: NEUROLOGY | Facility: CLINIC | Age: 60
End: 2020-08-18

## 2020-08-18 NOTE — TELEPHONE ENCOUNTER
LM for patient regarding his appointment  Dr HU would like to offer virtual visit for this F/U  Also spoke to wife who gave me [phone] as husbands phone is not in service at the moment   If patient calls back please offer virtual

## 2020-08-20 ENCOUNTER — TELEMEDICINE (OUTPATIENT)
Dept: NEUROLOGY | Facility: CLINIC | Age: 60
End: 2020-08-20
Payer: COMMERCIAL

## 2020-08-20 ENCOUNTER — TELEPHONE (OUTPATIENT)
Dept: NEUROLOGY | Facility: CLINIC | Age: 60
End: 2020-08-20

## 2020-08-20 DIAGNOSIS — G95.9 CERVICAL MYELOPATHY (HCC): Primary | ICD-10-CM

## 2020-08-20 PROBLEM — G37.9 CNS DEMYELINATION (HCC): Status: RESOLVED | Noted: 2020-05-22 | Resolved: 2020-08-20

## 2020-08-20 PROCEDURE — G2012 BRIEF CHECK IN BY MD/QHP: HCPCS | Performed by: PHYSICIAN ASSISTANT

## 2020-08-20 NOTE — PATIENT INSTRUCTIONS
Continue to follow with Orthopedic surgery post surgery  If physical therapy is prescribed by your surgeon, I would highly recommend following through with that  Please get the labs done that were ordered by Dr Viridiana Salcedo in 3-4 months or sooner if needed

## 2020-08-20 NOTE — TELEPHONE ENCOUNTER
Called and left message to schedule 4m f/u from virtual visit today with  Sj Rehman  Follow up is to be scheduled with Dr Dede Stafford

## 2020-08-20 NOTE — PROGRESS NOTES
[facility]  PATIENT:  Bere Goncalves  MRN:  [id number]  :  1960  DATE OF SERVICE:  2020    Virtual Brief Visit    Assessment/Plan:    Patient is a 60-year-old male who was referred to the [place] [address] [facility] for evaluation of abnormal cervical spine MRI  Patient had herniated disc at C4/C5 with cord signal change and severe canal stenosis with multifocal myelomalacia  There was question of this being an intrinsic abnormality due to demyelinating disease, although less likely  He had MRI of the brain which was not convincing for MS  It showed very minimal chronic microvascular disease, likely due to his vascular risk factors  He did not get the serum workup completed that was ordered  He had C4/C5 ACDF on 07/15/2020 with his orthopedic surgeon Dr Jessica Morelos  We discussed today that it is more likely his cervical cord signal changes are due to the disc protrusion and spinal stenosis  Less likely demyelinating disease, as no convincing evidence of MS in his brain  He is frustrated that he continues to have symptoms after surgery, specifically numbness and paresthesias in the arms, as well as balance difficulty  I told him that this is to be expected and that he is just in the beginning stages of healing  I also discussed with him that it is impossible to predict if he will have full recovery after surgery  He should continue to follow with his orthopedic surgeon and go through the physical therapy that will likely be prescribed to him  He is currently on gabapentin for paresthesias  He will continue at current dose  He can follow with Dr Melissa Gomez locally in a few months  He was advised to call the office for any new or worsening symptoms      Problem List Items Addressed This Visit        Nervous and Auditory    Cervical myelopathy ([facility] [address] ) - Primary                Reason for visit is   Chief Complaint   Patient presents with   [facility] Brief Visit        Encounter provider Alex Murphy PA-C    Provider located at [facility] [address]  [address] [place] [phone]    Recent Visits  No visits were found meeting these conditions  Showing recent visits within past 7 days and meeting all other requirements     Today's Visits  Date Type Provider Dept   08/20/20 [address] [place] [address],  [address], [facility] [address] today's visits and meeting all other requirements     Future Appointments  No visits were found meeting these conditions  Showing future appointments within next 150 days and meeting all other requirements        After connecting through telephone, the patient was identified by name and date of birth  Kareem Barrera was informed that this is a telemedicine visit and that the visit is being conducted through telephone  My office door was closed  No one else was in the room  He acknowledged consent and understanding of privacy and security of the platform  The patient has agreed to participate and understands he can discontinue the visit at any time  Patient is aware this is a billable service  Subjective    Kareem Barrera is a 63 y o  male who presents today for neurologic follow-up  To review, patient been followed by Dr Brian Carrasquillo for cervical radiculopathy and cervical stenosis  He had MRI of the cervical spine on 03/27/2020 which demonstrated multilevel cervical spondylosis, most notable at C4/C5 were multifactorial disease resultant overall moderate canal stenosis with associated cord compression  There were also foci of T2 cord signal hyperintensity centered along the gray matter at C4-C5, C5-C6 and C6-C7  Per Radiology, cord myomalacia favored, as suggested on a prior outside report  Demyelinating etiologies are considered to be less likely    Patient then had a repeat MRI cervical spine on 05/18/2020 which was stable compared to March  There was noted to be severe canal stenosis and multifocal myelomalacia  He was referred to the [facility] for further evaluation, although demyelinating disorder felt to be less likely  He was seen by Dr Russell Freed on 05/22/2020  MRI of the brain was ordered and completed on 06/17/2020  This noted a minor degree of chronic microvascular changes, without any convincing evidence for demyelinating disease  No enhancement  He did not complete the labs that her ordered by Dr Russell Freed  Patient than went back to his orthopedic surgeon and eventually had ACDF C4-5 on 7/15/03345  Patient reports continues to numbness and paresthesias in the arms  He also reports that he continues to have balance difficulty  He denies any falls  He says that he is frustrated that he still has symptoms after surgery      Past Medical History:   Diagnosis Date    Cervical radiculopathy     Diabetes mellitus ([address] )     Humeral fracture     Right    Hyperlipidemia     Hypertension        Past Surgical History:   Procedure Laterality Date    CARPAL TUNNEL RELEASE      CERVICAL SPINE SURGERY  07/15/2020    ACDF C4/C5    CYST REMOVAL      EYE SURGERY      LEG SURGERY      shrapnel removal    ROTATOR CUFF REPAIR Right     ULNAR NERVE TRANSPOSITION         Current Outpatient Medications   Medication Sig Dispense Refill    acetaminophen (TYLENOL) 325 mg tablet Take 2 tablets (650 mg total) by mouth every 4 (four) hours as needed for mild pain 30 tablet 0    amLODIPine (NORVASC) 10 mg tablet Take 1 tablet by mouth Daily      atorvastatin (LIPITOR) 40 mg tablet TAKE 1 TABLET BY MOUTH NIGHTLY      benazepril (LOTENSIN) 40 MG tablet Take 1 tablet by mouth daily      diazepam (VALIUM) 5 mg tablet Take 1 tab 40 min prior to MRI with a second dose 10 min before imaging 2 tablet 0    DULoxetine (Cymbalta) 30 mg delayed release capsule Daily      Empagliflozin (Jardiance) 10 MG TABS Take 10 mg by mouth daily      gabapentin (NEURONTIN) 100 mg capsule TAKE ONE CAPSULE BY MOUTH AT BEDTIME 30 capsule 0    glipiZIDE (GLUCOTROL) 10 mg tablet Take 10 mg by mouth 2 (two) times a day before meals      HYDROcodone-acetaminophen (Norco) 5-325 mg per tablet every 8 (eight) hours      ibuprofen (MOTRIN) 800 mg tablet Take 1 tablet by mouth 3 (three) times a day      meloxicam (MOBIC) 15 mg tablet Take 15 mg by mouth daily      metFORMIN (GLUCOPHAGE) 1000 MG tablet Take 1,000 mg by mouth 2 (two) times a day      Multiple Vitamin (MULTIVITAMIN) tablet Take 1 tablet by mouth daily      oxyCODONE-acetaminophen (PERCOCET) 5-325 mg per tablet Take 1 tablet by mouth 2 (two) times a day Take 1 tab 60 min prior to MRI and 4-6 hours after MRIMax Daily Amount: 2 tablets 2 tablet 0    pantoprazole (PROTONIX) 40 mg tablet Take 1 tablet (40 mg total) by mouth daily 30 tablet 3    bisacodyl (DULCOLAX) 5 mg EC tablet Take 4 tablets (20 mg total) by mouth once for 1 dose Colonoscopy prep 4 tablet 0    polyethylene glycol (GOLYTELY) 4000 mL solution Take 4,000 mL by mouth once for 1 dose Colonoscopy prep 4000 mL 0     No current facility-administered medications for this visit  Allergies   Allergen Reactions    Pravastatin GI Intolerance       Review of Systems   Constitutional: Negative  Negative for appetite change and fever  HENT: Negative  Negative for hearing loss, tinnitus, trouble swallowing and voice change  Eyes: Negative  Negative for photophobia and pain  Respiratory: Negative  Negative for shortness of breath  Cardiovascular: Negative  Negative for palpitations  Gastrointestinal: Negative  Negative for nausea and vomiting  Endocrine: Negative  Negative for cold intolerance  Genitourinary: Negative  Negative for dysuria, frequency and urgency  Musculoskeletal: Positive for gait problem (Balance difficulty)  Negative for myalgias and neck pain  Skin: Negative    Negative for rash    Neurological: Positive for numbness  Negative for dizziness, tremors, seizures, syncope, facial asymmetry, speech difficulty, weakness, light-headedness and headaches  Hematological: Negative  Does not bruise/bleed easily  Psychiatric/Behavioral: Negative  Negative for confusion, hallucinations and sleep disturbance  There were no vitals filed for this visit  I spent 12 minutes directly with the patient during this visit    VIRTUAL VISIT DISCLAIMER    Naveed Cobb acknowledges that he has consented to an online visit or consultation  He understands that the online visit is based solely on information provided by him, and that, in the absence of a face-to-face physical evaluation by the physician, the diagnosis he receives is both limited and provisional in terms of accuracy and completeness  This is not intended to replace a full medical face-to-face evaluation by the physician  Naveed Cobb understands and accepts these terms

## 2020-09-09 ENCOUNTER — TRANSCRIBE ORDERS (OUTPATIENT)
Dept: PHYSICAL THERAPY | Facility: CLINIC | Age: 60
End: 2020-09-09

## 2020-09-09 ENCOUNTER — EVALUATION (OUTPATIENT)
Dept: PHYSICAL THERAPY | Facility: CLINIC | Age: 60
End: 2020-09-09
Payer: COMMERCIAL

## 2020-09-09 DIAGNOSIS — M25.511 RIGHT SHOULDER PAIN, UNSPECIFIED CHRONICITY: Primary | ICD-10-CM

## 2020-09-09 PROCEDURE — 97535 SELF CARE MNGMENT TRAINING: CPT

## 2020-09-09 PROCEDURE — 97162 PT EVAL MOD COMPLEX 30 MIN: CPT

## 2020-09-09 NOTE — PROGRESS NOTES
PT Evaluation     Today's date: 2020  Patient name: Aaron Vang  : 1960  MRN: [id number]  Referring provider: Juvenal Killian MD  Dx:   Encounter Diagnosis     ICD-10-CM    1  Right shoulder pain, unspecified chronicity  M25 511                   Assessment  Assessment details: Pt presents with continued right shoulder pain and functional limitations after falling 2020  Hx right RTC repair prior  Recent c-spine fusion noted  Reports no improvement in pain since that time  Has had continued deficits in strength and ROM as well  Feels symptoms worsening  Recent MRI negative  Had cortisone injection with no change in symptoms  Reports altered sensation throughout RUE which is chronic in nature  Pt to see MD on 20 to discuss possible surgical intervention  Pt will benefit from PT tx to decrease symptoms and improve functional level  Impairments: abnormal or restricted ROM, activity intolerance, impaired physical strength, lacks appropriate home exercise program and pain with function     Prognosis: fair    Goals  ST  Decrease pain 50% 6 wk  2  Increase shoulder PROM to [facility] 6 wk  3  Increase shoulder strength to 3+/5 6 wk  Pt will report no difficulty with activity below shoulder level 6 wk  LT  Pt will report no pain 12 wk  2  Increase shoulder AROM to WNL 12 wk  3  Increase shoulder strength to WNL 12 wk    Pt will report no limitations with ADL's 12 wk    Plan  Patient would benefit from: PT eval and skilled physical therapy  Planned modality interventions: cryotherapy, thermotherapy: hydrocollator packs, unattended electrical stimulation and ultrasound  Planned therapy interventions: joint mobilization, manual therapy, neuromuscular re-education, strengthening, therapeutic activities, stretching, therapeutic exercise, functional ROM exercises, flexibility and home exercise program  Frequency: 3x week  Duration in weeks: 12  Treatment plan discussed with: patient        Subjective Evaluation    History of Present Illness  Mechanism of injury: Pt presents with right shoulder pain  Reports falling May 17, 2020 with injury to right shoulder and ribs  Has history of right RTC repair 8-15-19  Reports since falling he has had increased pain and decreased function of RUE  Inability to raise UE noted  Recent C-spine fusion noted, 2020  No restrictions on activity level reported due to c-spine surgery  Reports recent injection to right shoulder with no change in symptoms  Had MRI which was negative but reports MD feels he likely re-injured RTC  Reports he is to see surgeon again on 20 to discuss possible surgery  Reports altered sensation throughout RUE which is chronic in nature  Pain  Current pain ratin  At best pain ratin  At worst pain rating: 10  Location: Right Shoulder  Quality: sharp and dull ache  Relieving factors: rest and medications  Progression: worsening    Hand dominance: right  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     Treatments  Previous treatment: physical therapy, injection treatment and medication  Current treatment: medication and physical therapy  Patient Goals  Patient goals for therapy: decreased pain, increased strength, increased motion, return to work, independence with ADLs/IADLs and return to sport/leisure activities  Patient goal: Return to prior functional level         Objective     Observations     Right Shoulder  Positive for atrophy  Additional Observation Details  MM Atrophy noted right shoulder, scapular region and UE      Reports he is most comfortable with RUE in guarded position (sling position)    Tenderness     Additional Tenderness Details  Hypersensitive to light palpation lower scapular region  Tender to palpation all aspects right shoulder    Active Range of Motion     Right Shoulder   Flexion: 75 degrees with pain  Abduction: 40 degrees with pain    Additional Active Range of Motion Details  Unable to reach behind head or back due to pain/weakness    Passive Range of Motion     Right Shoulder   Flexion: 35 degrees   Abduction: 50 degrees with pain  External rotation 45°: 0 degrees with pain  Internal rotation 45°: 45 degrees with pain    Additional Passive Range of Motion Details  Sharp pain noted with PROM Flex, Abd, ER    Strength/Myotome Testing     Additional Strength Details  Poor+/Fair- strength Right shoulder all planes  Sharp pain noted with resisted ER    Tests     Additional Tests Details  Unable to tolerate       Flowsheet Rows      Most Recent Value   PT/OT G-Codes   Current Score  41   Projected Score  64                Precautions:  Recent Cervical Fusion    Hx Right RTC Repair       Manuals 9-9-20       PROM Right Shoulder                                Neuro Re-Ed                                                                 Ther Ex        pendulums        shrugs        retractions        Bicep curl        t-band tricep ext        pulleys        Shld Isometrics        Table slides        Gripper                                 Ther Activity                        Gait Training                        Modalities        CP/MHP

## 2020-09-09 NOTE — LETTER
2020    MD Olga Amador 187    Patient: Vitaliy Kerns   YOB: 1960   Date of Visit: 2020     Encounter Diagnosis     ICD-10-CM    1  Right shoulder pain, unspecified chronicity  M25 511        Dear Dr Mariana Crane: Thank you for your recent referral of Vitaliy Kerns  Please review the attached evaluation summary from Farzad's recent visit  Please verify that you agree with the plan of care by signing the attached order  If you have any questions or concerns, please do not hesitate to call  I sincerely appreciate the opportunity to share in the care of one of your patients and hope to have another opportunity to work with you in the near future  Sincerely,    Elly Menon, PT      Referring Provider:      I certify that I have read the below Plan of Care and certify the need for these services furnished under this plan of treatment while under my care  MD Olga Amador [address]: [phone]          PT Evaluation     Today's date: 2020  Patient name: Vitaily Kerns  : 1960  MRN: [id number]  Referring provider: Maria E Green MD  Dx:   Encounter Diagnosis     ICD-10-CM    1  Right shoulder pain, unspecified chronicity  M25 511                   Assessment  Assessment details: Pt presents with continued right shoulder pain and functional limitations after falling 2020  Hx right RTC repair prior  Recent c-spine fusion noted  Reports no improvement in pain since that time  Has had continued deficits in strength and ROM as well  Feels symptoms worsening  Recent MRI negative  Had cortisone injection with no change in symptoms  Reports altered sensation throughout RUE which is chronic in nature  Pt to see MD on 20 to discuss possible surgical intervention    Pt will benefit from PT tx to decrease symptoms and improve functional level  Impairments: abnormal or restricted ROM, activity intolerance, impaired physical strength, lacks appropriate home exercise program and pain with function     Prognosis: fair    Goals  ST  Decrease pain 50% 6 wk  2  Increase shoulder PROM to [facility] 6 wk  3  Increase shoulder strength to 3+/5 6 wk  Pt will report no difficulty with activity below shoulder level 6 wk  LT  Pt will report no pain 12 wk  2  Increase shoulder AROM to WNL 12 wk  3  Increase shoulder strength to WNL 12 wk  Pt will report no limitations with ADL's 12 wk    Plan  Patient would benefit from: PT eval and skilled physical therapy  Planned modality interventions: cryotherapy, thermotherapy: hydrocollator packs, unattended electrical stimulation and ultrasound  Planned therapy interventions: joint mobilization, manual therapy, neuromuscular re-education, strengthening, therapeutic activities, stretching, therapeutic exercise, functional ROM exercises, flexibility and home exercise program  Frequency: 3x week  Duration in weeks: 12  Treatment plan discussed with: patient        Subjective Evaluation    History of Present Illness  Mechanism of injury: Pt presents with right shoulder pain  Reports falling May 17, 2020 with injury to right shoulder and ribs  Has history of right RTC repair 8-15-19  Reports since falling he has had increased pain and decreased function of RUE  Inability to raise UE noted  Recent C-spine fusion noted, 2020  No restrictions on activity level reported due to c-spine surgery  Reports recent injection to right shoulder with no change in symptoms  Had MRI which was negative but reports MD feels he likely re-injured RTC  Reports he is to see surgeon again on 20 to discuss possible surgery  Reports altered sensation throughout RUE which is chronic in nature      Pain  Current pain ratin  At best pain ratin  At worst pain rating: 10  Location: Right Shoulder  Quality: sharp and dull ache  Relieving factors: rest and medications  Progression: worsening    Hand dominance: right  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     Treatments  Previous treatment: physical therapy, injection treatment and medication  Current treatment: medication and physical therapy  Patient Goals  Patient goals for therapy: decreased pain, increased strength, increased motion, return to work, independence with ADLs/IADLs and return to sport/leisure activities  Patient goal: Return to prior functional level         Objective     Observations     Right Shoulder  Positive for atrophy  Additional Observation Details  MM Atrophy noted right shoulder, scapular region and UE  Reports he is most comfortable with RUE in guarded position (sling position)    Tenderness     Additional Tenderness Details  Hypersensitive to light palpation lower scapular region  Tender to palpation all aspects right shoulder    Active Range of Motion     Right Shoulder   Flexion: 75 degrees with pain  Abduction: 40 degrees with pain    Additional Active Range of Motion Details  Unable to reach behind head or back due to pain/weakness    Passive Range of Motion     Right Shoulder   Flexion: 35 degrees   Abduction: 50 degrees with pain  External rotation 45°: 0 degrees with pain  Internal rotation 45°: 45 degrees with pain    Additional Passive Range of Motion Details  Sharp pain noted with PROM Flex, Abd, ER    Strength/Myotome Testing     Additional Strength Details  Poor+/Fair- strength Right shoulder all planes  Sharp pain noted with resisted ER    Tests     Additional Tests Details  Unable to tolerate       Flowsheet Rows      Most Recent Value   PT/OT G-Codes   Current Score  41   Projected Score  64                Precautions:  Recent Cervical Fusion    Hx Right RTC Repair       Manuals 9-9-20       PROM Right Shoulder                                Neuro Re-Ed Ther Ex        pendulums        shrugs        retractions        Bicep curl        t-band tricep ext        pulleys        Shld Isometrics        Table slides        Gripper                                 Ther Activity                        Gait Training                        Modalities        CP/MHP

## 2020-09-11 ENCOUNTER — OFFICE VISIT (OUTPATIENT)
Dept: PHYSICAL THERAPY | Facility: CLINIC | Age: 60
End: 2020-09-11
Payer: COMMERCIAL

## 2020-09-11 DIAGNOSIS — M25.511 RIGHT SHOULDER PAIN, UNSPECIFIED CHRONICITY: Primary | ICD-10-CM

## 2020-09-11 PROCEDURE — 97140 MANUAL THERAPY 1/> REGIONS: CPT

## 2020-09-11 PROCEDURE — 97110 THERAPEUTIC EXERCISES: CPT

## 2020-09-11 NOTE — PROGRESS NOTES
Daily Note     Today's date: 2020  Patient name: Digna Pollard  : 1960   MRN: [id number]  Referring provider: Oleksandr Aguilar PA-C  Dx:   Encounter Diagnosis     ICD-10-CM    1  Right shoulder pain, unspecified chronicity  M25 511                   Subjective:  Everything is hurting today      Objective: See treatment diary below      Assessment: Tolerated treatment fair  Guarding and mm tightness noted with PROM right shoulder  Limited AROM noted with TE  Patient would benefit from continued PT      Plan: Continue per plan of care  Precautions:  Recent Cervical Fusion    Hx Right RTC Repair       Manuals 20      PROM Right Shoulder  15'                              Neuro Re-Ed                                                                 Ther Ex        pendulums  20x        shrugs  20x      retractions        Bicep curl  30x      t-band tricep ext        pulleys  5'      Shld Isometrics        Table slides  20x      Gripper         LTP/MTP  Red 20x ea                      Ther Activity                        Gait Training                        Modalities        CP/MHP  MHP  10'

## 2020-09-15 ENCOUNTER — TELEPHONE (OUTPATIENT)
Dept: GASTROENTEROLOGY | Facility: CLINIC | Age: 60
End: 2020-09-15

## 2020-09-15 DIAGNOSIS — K22.89 ESOPHAGEAL THICKENING: ICD-10-CM

## 2020-09-15 RX ORDER — PANTOPRAZOLE SODIUM 40 MG/1
40 TABLET, DELAYED RELEASE ORAL DAILY
Qty: 30 TABLET | Refills: 3 | Status: SHIPPED | OUTPATIENT
Start: 2020-09-15 | End: 2020-09-17 | Stop reason: SDUPTHER

## 2020-09-16 ENCOUNTER — OFFICE VISIT (OUTPATIENT)
Dept: PHYSICAL THERAPY | Facility: CLINIC | Age: 60
End: 2020-09-16
Payer: COMMERCIAL

## 2020-09-16 DIAGNOSIS — M25.511 RIGHT SHOULDER PAIN, UNSPECIFIED CHRONICITY: Primary | ICD-10-CM

## 2020-09-16 PROCEDURE — 97140 MANUAL THERAPY 1/> REGIONS: CPT

## 2020-09-16 PROCEDURE — 97110 THERAPEUTIC EXERCISES: CPT

## 2020-09-16 NOTE — PROGRESS NOTES
Daily Note     Today's date: 2020  Patient name: Chen Cline  : 1960  MRN: [id number]  Referring provider: Melba Karu PA-C  Dx:   Encounter Diagnosis     ICD-10-CM    1  Right shoulder pain, unspecified chronicity  M25 511                   Subjective: The shoulder is sore  Objective: See treatment diary below      Assessment: Tolerated treatment fair  Reports pain across bilateral upper trap/scapular regions  To see spine surgeon 20  Continued deficits in ROM/Strength noted right shoulder due to pain  Patient would benefit from continued PT      Plan: Continue per plan of care  Precautions:  Recent Cervical Fusion    Hx Right RTC Repair       Manuals 20     PROM Right Shoulder  15' 15'                             Neuro Re-Ed                                                                 Ther Ex        pendulums  20x   30x     shrugs  20x 20x     retractions   20x     Bicep curl  30x 30x     t-band tricep ext        pulleys  5' 5'     Shld Isometrics        Table slides  20x      Gripper         LTP/MTP  Red 20x ea Red 20x ea     T-band ER/IR   Red 20x ea             Ther Activity                        Gait Training                        Modalities        CP/MHP  MHP  10' MHP 10'

## 2020-09-17 ENCOUNTER — TELEPHONE (OUTPATIENT)
Dept: GASTROENTEROLOGY | Facility: CLINIC | Age: 60
End: 2020-09-17

## 2020-09-17 DIAGNOSIS — K22.89 ESOPHAGEAL THICKENING: ICD-10-CM

## 2020-09-17 RX ORDER — PANTOPRAZOLE SODIUM 40 MG/1
40 TABLET, DELAYED RELEASE ORAL 2 TIMES DAILY
Qty: 180 TABLET | Refills: 3 | Status: SHIPPED | OUTPATIENT
Start: 2020-09-17 | End: 2020-12-01

## 2020-09-17 NOTE — TELEPHONE ENCOUNTER
RX: Protonix 40 mg/2 per day 180 qty /refills  Uses: [phone]  Please phone [phone] if, any  questions

## 2020-09-18 ENCOUNTER — OFFICE VISIT (OUTPATIENT)
Dept: PHYSICAL THERAPY | Facility: CLINIC | Age: 60
End: 2020-09-18
Payer: COMMERCIAL

## 2020-09-18 DIAGNOSIS — M25.511 RIGHT SHOULDER PAIN, UNSPECIFIED CHRONICITY: Primary | ICD-10-CM

## 2020-09-18 PROCEDURE — 97140 MANUAL THERAPY 1/> REGIONS: CPT

## 2020-09-18 PROCEDURE — 97110 THERAPEUTIC EXERCISES: CPT

## 2020-09-18 NOTE — PROGRESS NOTES
Daily Note     Today's date: 2020  Patient name: Cinthya Romo  : 1960  MRN: [id number]  Referring provider: Quinton Harrington PA-C  Dx:   Encounter Diagnosis     ICD-10-CM    1  Right shoulder pain, unspecified chronicity  M25 511                   Subjective:  I see the surgeon for my neck today  The shoulder hurts      Objective: See treatment diary below      Assessment: Tolerated treatment well  Reports continued pain right shoulder  Altered sensation continues RUE which is chronic  Reports hitting boards with a hammer using RUE to see how it feels  Advised against this  Patient would benefit from continued PT      Plan: Continue per plan of care  Precautions:  Recent Cervical Fusion    Hx Right RTC Repair       Manuals 20    PROM Right Shoulder  15' 15 16'                            Neuro Re-Ed                                                                 Ther Ex        pendulums  20x   30x 30x    shrugs  20x 20x 30x    retractions   20x 30x    Bent over row    30x    Bicep curl  30x 30x 30x    t-band tricep ext        pulleys  5' 5' 5'    Shld Isometrics        Table slides  20x      Gripper         LTP/MTP  Red 20x ea Red 20x ea Red 30x    T-band ER/IR   Red 20x ea Red 30x            Ther Activity                        Gait Training                        Modalities        CP/MHP  MHP  10' MHP 10' MHP 10'

## 2020-09-21 NOTE — TELEPHONE ENCOUNTER
Pt called, states that [facility] did not receive the RX for his Protonix and he only has 3 pills left  Please resend and let pt know when it is sent in   Ty

## 2020-09-21 NOTE — TELEPHONE ENCOUNTER
MT: Spoke with patient  He understands his omeprazole is too soon to fill  10/9 is his next refill of pantoprazole  He will take omeprazole for two weeks until he can get the pantoprazole

## 2020-09-23 ENCOUNTER — OFFICE VISIT (OUTPATIENT)
Dept: PHYSICAL THERAPY | Facility: CLINIC | Age: 60
End: 2020-09-23
Payer: COMMERCIAL

## 2020-09-23 DIAGNOSIS — M25.511 RIGHT SHOULDER PAIN, UNSPECIFIED CHRONICITY: Primary | ICD-10-CM

## 2020-09-23 PROCEDURE — 97110 THERAPEUTIC EXERCISES: CPT

## 2020-09-23 PROCEDURE — 97140 MANUAL THERAPY 1/> REGIONS: CPT

## 2020-09-23 NOTE — PROGRESS NOTES
Daily Note     Today's date: 2020  Patient name: Dianne Su  : 1960  MRN: [id number]  Referring provider: Vero Gibbons PA-C  Dx:   Encounter Diagnosis     ICD-10-CM    1  Right shoulder pain, unspecified chronicity  M25 511                   Subjective:  I saw Dr Yenni Allred  They did x-rays of my neck and everything looked good      Objective: See treatment diary below      Assessment: Tolerated treatment fair  Improved tolerance to t-band ER/IR noted  Pain continues at all end ranges with manual therapy  Patient would benefit from continued PT      Plan: Continue per plan of care  Precautions:  Recent Cervical Fusion    Hx Right RTC Repair       Manuals 20   PROM Right Shoulder  15' 15' 16' 13'                           Neuro Re-Ed                                                                 Ther Ex        pendulums  20x   30x 30x 30x   shrugs  20x 20x 30x 30x   retractions   20x 30x 30x   Bent over row    30x    Bicep curl  30x 30x 30x 30x   t-band tricep ext        pulleys  5' 5' 5' 5'   Shld Isometrics        Table slides  20x      Gripper         LTP/MTP  Red 20x ea Red 20x ea Red 30x Red 30x   T-band ER/IR   Red 20x ea Red 30x Red 30x           Ther Activity                        Gait Training                        Modalities        CP/MHP  MHP  10' MHP 10' MHP 10' MHP 10'

## 2020-09-25 ENCOUNTER — OFFICE VISIT (OUTPATIENT)
Dept: PHYSICAL THERAPY | Facility: CLINIC | Age: 60
End: 2020-09-25
Payer: COMMERCIAL

## 2020-09-25 DIAGNOSIS — M25.511 RIGHT SHOULDER PAIN, UNSPECIFIED CHRONICITY: Primary | ICD-10-CM

## 2020-09-25 PROCEDURE — 97140 MANUAL THERAPY 1/> REGIONS: CPT

## 2020-09-25 PROCEDURE — 97110 THERAPEUTIC EXERCISES: CPT

## 2020-09-25 NOTE — PROGRESS NOTES
Daily Note     Today's date: 2020  Patient name: Vitaliy Kerns  : 1960  MRN: [id number]  Referring provider: Doris Vazquez PA-C  Dx:   Encounter Diagnosis     ICD-10-CM    1  Right shoulder pain, unspecified chronicity  M25 511                   Subjective:  On Wednesday I passed out and fell forward  I don't know how long I was out for but remember falling face first         Objective: See treatment diary below      Assessment: Tolerated treatment fair  Reports seeing spine surgeon 20 and discussed fall with MD   Reports taking pain medications at night and still feeling "high" in the mornings  Pain limiting factor with ROM progression all planes  Patient would benefit from continued PT      Plan: Continue per plan of care  Precautions:  Recent Cervical Fusion    Hx Right RTC Repair       Manuals 20   PROM Right Shoulder 15' 15' 16' 13' 15'                           Neuro Re-Ed                                                                 Ther Ex        pendulums 30x 20x   30x 30x 30x   shrugs 30x 20x 20x 30x 30x   retractions 30x  20x 30x 30x   Bent over row 30x   30x    Bicep curl 30x 30x 30x 30x 30x   t-band tricep ext        pulleys 5' 5' 5' 5' 5'   Shld Isometrics        Table slides  20x      Gripper         LTP/MTP Red 30x Red 20x ea Red 20x ea Red 30x Red 30x   T-band ER/IR Red 30x  Red 20x ea Red 30x Red 30x           Ther Activity                        Gait Training                        Modalities        CP/MHP MHP 10' MHP  10' MHP 10' MHP 10' MHP 10'

## 2020-09-30 ENCOUNTER — TELEMEDICINE (OUTPATIENT)
Dept: NEUROLOGY | Facility: CLINIC | Age: 60
End: 2020-09-30
Payer: COMMERCIAL

## 2020-09-30 ENCOUNTER — APPOINTMENT (OUTPATIENT)
Dept: PHYSICAL THERAPY | Facility: CLINIC | Age: 60
End: 2020-09-30
Payer: COMMERCIAL

## 2020-09-30 DIAGNOSIS — G56.21 ULNAR NEUROPATHY OF RIGHT UPPER EXTREMITY: ICD-10-CM

## 2020-09-30 DIAGNOSIS — M25.511 CHRONIC RIGHT SHOULDER PAIN: ICD-10-CM

## 2020-09-30 DIAGNOSIS — G89.29 CHRONIC RIGHT SHOULDER PAIN: ICD-10-CM

## 2020-09-30 DIAGNOSIS — M54.12 CERVICAL RADICULOPATHY: Primary | ICD-10-CM

## 2020-09-30 PROCEDURE — G2012 BRIEF CHECK IN BY MD/QHP: HCPCS | Performed by: PSYCHIATRY & NEUROLOGY

## 2020-09-30 RX ORDER — PREGABALIN 75 MG/1
CAPSULE ORAL
COMMUNITY
Start: 2020-07-16

## 2020-09-30 RX ORDER — POLYETHYLENE GLYCOL 3350, SODIUM CHLORIDE, SODIUM BICARBONATE, POTASSIUM CHLORIDE 420; 11.2; 5.72; 1.48 G/4L; G/4L; G/4L; G/4L
POWDER, FOR SOLUTION ORAL
COMMUNITY
End: 2020-09-30 | Stop reason: ALTCHOICE

## 2020-09-30 RX ORDER — ACETAMINOPHEN AND CODEINE PHOSPHATE 300; 30 MG/1; MG/1
TABLET ORAL EVERY 6 HOURS
COMMUNITY
End: 2020-09-30 | Stop reason: ALTCHOICE

## 2020-09-30 RX ORDER — GABAPENTIN 300 MG/1
CAPSULE ORAL
COMMUNITY

## 2020-09-30 RX ORDER — FLUTICASONE PROPIONATE 50 MCG
2 SPRAY, SUSPENSION (ML) NASAL
COMMUNITY
Start: 2020-07-20

## 2020-09-30 RX ORDER — LISINOPRIL 10 MG/1
10 TABLET ORAL DAILY
COMMUNITY

## 2020-09-30 NOTE — PROGRESS NOTES
Virtual Brief Visit    Assessment/Plan:  1  Cervical radiculopathy    2  Ulnar neuropathy of right upper extremity    3  Chronic right shoulder pain        Patient seems to be doing good with his neck pain which has completely resolved, he does have some numbness in the right hand and feels that overall he is 90% better, he is in physical therapy for his right shoulder pain and is in follow up with the orthopedic surgeon, he was seen by MS specialist and felt that he does not have MS, he was advised to continue follow-up with his orthopedic surgeon and spine surgeon, he was advised to keep his blood pressure cholesterol and sugar under control, go to the hospital if has any worsening symptoms and call me otherwise to see me back in 4-6 months and follow up with his other physicians  Problem List Items Addressed This Visit     None                Reason for visit is   Chief Complaint   Patient presents with    Virtual Brief Visit        Encounter provider Carl Jose MD    Provider located at [address] [address]    Recent Visits  No visits were found meeting these conditions  Showing recent visits within past 7 days and meeting all other requirements     Today's Visits  Date Type Provider Dept   09/30/20 Telemedicine Carl Jose MD [address] [place] [address] Drive today's visits and meeting all other requirements     Future Appointments  No visits were found meeting these conditions  Showing future appointments within next 150 days and meeting all other requirements        After connecting through telephone, the patient was identified by name and date of birth  Braydon Dotson was informed that this is a telemedicine visit and that the visit is being conducted through telephone  My office door was closed  No one else was in the room    He acknowledged consent and understanding of privacy and security of the platform  The patient has agreed to participate and understands he can discontinue the visit at any time  Patient is aware this is a billable service  Subjective    Olena Dutta is a 63 y o  male in follow-up for his neck pain radiating to the right arm, he status post cervical surgery for spinal stenosis and cord compression, since his surgery in July he feels that he is 90% better he does not have any neck pain, he does have some numbness in the right hand but no pain in the right arm, he does have right shoulder pain for which she is in follow up with his orthopedic surgeon for his rotator cuff issues, he has had ulnar nerve surgery in May of last year, he denies any bowel and bladder incontinence, no vision or speech difficulty, no headache, no dizziness, no symptoms in the left arm or the left lower extremity, no other neurological complaints        Past Medical History:   Diagnosis Date    Cervical radiculopathy     Diabetes mellitus ([address] )     Humeral fracture     Right    Hyperlipidemia     Hypertension        Past Surgical History:   Procedure Laterality Date    CARPAL TUNNEL RELEASE      CERVICAL SPINE SURGERY  07/15/2020    ACDF C4/C5    CYST REMOVAL      EYE SURGERY      LEG SURGERY      shrapnel removal    ROTATOR CUFF REPAIR Right     ULNAR NERVE TRANSPOSITION         Current Outpatient Medications   Medication Sig Dispense Refill    acetaminophen (TYLENOL) 325 mg tablet Take 2 tablets (650 mg total) by mouth every 4 (four) hours as needed for mild pain 30 tablet 0    amLODIPine (NORVASC) 10 mg tablet Take 1 tablet by mouth Daily      atorvastatin (LIPITOR) 40 mg tablet TAKE 1 TABLET BY MOUTH NIGHTLY      benazepril (LOTENSIN) 40 MG tablet Take 1 tablet by mouth daily      bisacodyl (DULCOLAX) 5 mg EC tablet Take 4 tablets (20 mg total) by mouth once for 1 dose Colonoscopy prep 4 tablet 0    diazepam (VALIUM) 5 mg tablet Take 1 tab 40 min prior to MRI with a second dose 10 min before imaging 2 tablet 0    DULoxetine (Cymbalta) 30 mg delayed release capsule Daily      Empagliflozin (Jardiance) 10 MG TABS Take 10 mg by mouth daily      gabapentin (NEURONTIN) 100 mg capsule TAKE ONE CAPSULE BY MOUTH AT BEDTIME 30 capsule 0    glipiZIDE (GLUCOTROL) 10 mg tablet Take 10 mg by mouth 2 (two) times a day before meals      HYDROcodone-acetaminophen (Norco) 5-325 mg per tablet every 8 (eight) hours      ibuprofen (MOTRIN) 800 mg tablet Take 1 tablet by mouth 3 (three) times a day      meloxicam (MOBIC) 15 mg tablet Take 15 mg by mouth daily      metFORMIN (GLUCOPHAGE) 1000 MG tablet Take 1,000 mg by mouth 2 (two) times a day      Multiple Vitamin (MULTIVITAMIN) tablet Take 1 tablet by mouth daily      oxyCODONE-acetaminophen (PERCOCET) 5-325 mg per tablet Take 1 tablet by mouth 2 (two) times a day Take 1 tab 60 min prior to MRI and 4-6 hours after MRIMax Daily Amount: 2 tablets 2 tablet 0    pantoprazole (PROTONIX) 40 mg tablet Take 1 tablet (40 mg total) by mouth 2 (two) times a day 180 tablet 3    polyethylene glycol (GOLYTELY) 4000 mL solution Take 4,000 mL by mouth once for 1 dose Colonoscopy prep 4000 mL 0     No current facility-administered medications for this visit  Allergies   Allergen Reactions    Pravastatin GI Intolerance       Review of Systems   Constitutional: Negative for appetite change, fatigue and fever  HENT: Negative for ear pain, tinnitus, trouble swallowing and voice change  Eyes: Negative for photophobia, pain and visual disturbance  Respiratory: Negative for apnea, chest tightness and shortness of breath  Gastrointestinal: Negative for abdominal pain, constipation, diarrhea, nausea and vomiting  Endocrine: Negative for cold intolerance and heat intolerance  Genitourinary: Negative for difficulty urinating, frequency and urgency     Musculoskeletal: Negative for back pain, gait problem, joint swelling, myalgias and neck pain  Right shoulder pain   Skin: Negative for rash  Neurological: Negative for dizziness, tremors, seizures, syncope, facial asymmetry, speech difficulty, weakness, light-headedness, numbness and headaches  Psychiatric/Behavioral: Negative for confusion, decreased concentration and sleep disturbance  The patient is not nervous/anxious and is not hyperactive  I have reviewed the past medical history, surgical history, social and family history, current medications, allergies vitals, review of systems, and updated this information as appropriate today  There were no vitals filed for this visit  It was my intent to perform this visit via video technology but the patient was not able to do a video connection so the visit was completed via audio telephone only  I spent 10 minutes directly with the patient during this visit    VIRTUAL VISIT DISCLAIMER    Mariela Swan acknowledges that he has consented to an online visit or consultation  He understands that the online visit is based solely on information provided by him, and that, in the absence of a face-to-face physical evaluation by the physician, the diagnosis he receives is both limited and provisional in terms of accuracy and completeness  This is not intended to replace a full medical face-to-face evaluation by the physician  Mariela Swan understands and accepts these terms

## 2020-10-02 ENCOUNTER — OFFICE VISIT (OUTPATIENT)
Dept: PHYSICAL THERAPY | Facility: CLINIC | Age: 60
End: 2020-10-02
Payer: COMMERCIAL

## 2020-10-02 DIAGNOSIS — M25.511 RIGHT SHOULDER PAIN, UNSPECIFIED CHRONICITY: Primary | ICD-10-CM

## 2020-10-02 PROCEDURE — 97140 MANUAL THERAPY 1/> REGIONS: CPT

## 2020-10-02 PROCEDURE — 97110 THERAPEUTIC EXERCISES: CPT

## 2020-10-07 ENCOUNTER — OFFICE VISIT (OUTPATIENT)
Dept: PHYSICAL THERAPY | Facility: CLINIC | Age: 60
End: 2020-10-07
Payer: COMMERCIAL

## 2020-10-07 DIAGNOSIS — M25.511 RIGHT SHOULDER PAIN, UNSPECIFIED CHRONICITY: Primary | ICD-10-CM

## 2020-10-07 PROCEDURE — 97110 THERAPEUTIC EXERCISES: CPT

## 2020-10-07 PROCEDURE — 97140 MANUAL THERAPY 1/> REGIONS: CPT

## 2020-10-09 ENCOUNTER — OFFICE VISIT (OUTPATIENT)
Dept: PHYSICAL THERAPY | Facility: CLINIC | Age: 60
End: 2020-10-09
Payer: COMMERCIAL

## 2020-10-09 DIAGNOSIS — M25.511 RIGHT SHOULDER PAIN, UNSPECIFIED CHRONICITY: Primary | ICD-10-CM

## 2020-10-09 PROCEDURE — 97110 THERAPEUTIC EXERCISES: CPT

## 2020-10-09 PROCEDURE — 97140 MANUAL THERAPY 1/> REGIONS: CPT

## 2020-10-14 ENCOUNTER — OFFICE VISIT (OUTPATIENT)
Dept: PHYSICAL THERAPY | Facility: CLINIC | Age: 60
End: 2020-10-14
Payer: COMMERCIAL

## 2020-10-14 DIAGNOSIS — M25.511 RIGHT SHOULDER PAIN, UNSPECIFIED CHRONICITY: Primary | ICD-10-CM

## 2020-10-14 PROCEDURE — 97110 THERAPEUTIC EXERCISES: CPT | Performed by: PHYSICAL THERAPIST

## 2020-10-14 PROCEDURE — 97140 MANUAL THERAPY 1/> REGIONS: CPT | Performed by: PHYSICAL THERAPIST

## 2020-10-16 ENCOUNTER — OFFICE VISIT (OUTPATIENT)
Dept: PHYSICAL THERAPY | Facility: CLINIC | Age: 60
End: 2020-10-16
Payer: COMMERCIAL

## 2020-10-16 DIAGNOSIS — M25.511 RIGHT SHOULDER PAIN, UNSPECIFIED CHRONICITY: Primary | ICD-10-CM

## 2020-10-21 ENCOUNTER — OFFICE VISIT (OUTPATIENT)
Dept: PHYSICAL THERAPY | Facility: CLINIC | Age: 60
End: 2020-10-21
Payer: COMMERCIAL

## 2020-10-21 DIAGNOSIS — M25.511 RIGHT SHOULDER PAIN, UNSPECIFIED CHRONICITY: Primary | ICD-10-CM

## 2020-10-21 PROCEDURE — 97110 THERAPEUTIC EXERCISES: CPT

## 2020-10-21 PROCEDURE — 97140 MANUAL THERAPY 1/> REGIONS: CPT

## 2020-10-23 ENCOUNTER — TRANSCRIBE ORDERS (OUTPATIENT)
Dept: PHYSICAL THERAPY | Facility: CLINIC | Age: 60
End: 2020-10-23

## 2020-10-23 ENCOUNTER — OFFICE VISIT (OUTPATIENT)
Dept: PHYSICAL THERAPY | Facility: CLINIC | Age: 60
End: 2020-10-23
Payer: COMMERCIAL

## 2020-10-23 DIAGNOSIS — M25.511 RIGHT SHOULDER PAIN, UNSPECIFIED CHRONICITY: Primary | ICD-10-CM

## 2020-10-23 PROCEDURE — 97110 THERAPEUTIC EXERCISES: CPT

## 2020-10-23 PROCEDURE — 97140 MANUAL THERAPY 1/> REGIONS: CPT

## 2020-10-23 PROCEDURE — 97164 PT RE-EVAL EST PLAN CARE: CPT

## 2020-10-28 ENCOUNTER — APPOINTMENT (OUTPATIENT)
Dept: PHYSICAL THERAPY | Facility: CLINIC | Age: 60
End: 2020-10-28
Payer: COMMERCIAL

## 2020-10-29 ENCOUNTER — APPOINTMENT (OUTPATIENT)
Dept: PHYSICAL THERAPY | Facility: CLINIC | Age: 60
End: 2020-10-29
Payer: COMMERCIAL

## 2020-10-30 ENCOUNTER — APPOINTMENT (OUTPATIENT)
Dept: PHYSICAL THERAPY | Facility: CLINIC | Age: 60
End: 2020-10-30
Payer: COMMERCIAL

## 2020-11-30 DIAGNOSIS — K22.89 ESOPHAGEAL THICKENING: ICD-10-CM

## 2020-12-01 RX ORDER — PANTOPRAZOLE SODIUM 40 MG/1
TABLET, DELAYED RELEASE ORAL
Qty: 30 TABLET | Refills: 3 | Status: SHIPPED | OUTPATIENT
Start: 2020-12-01 | End: 2021-03-08

## 2021-01-10 NOTE — TELEPHONE ENCOUNTER
160.02 Called patient to see if he would be available to come in tomorrow August 6 at 830 or 930 to see Dr Erica Puga  Patient is on wait list for sooner appointment  No answer  Left voicemail

## 2021-01-12 ENCOUNTER — EVALUATION (OUTPATIENT)
Dept: PHYSICAL THERAPY | Facility: CLINIC | Age: 61
End: 2021-01-12
Payer: COMMERCIAL

## 2021-01-12 ENCOUNTER — TRANSCRIBE ORDERS (OUTPATIENT)
Dept: PHYSICAL THERAPY | Facility: CLINIC | Age: 61
End: 2021-01-12

## 2021-01-12 DIAGNOSIS — Z98.890 S/P COMPLETE REPAIR OF ROTATOR CUFF: ICD-10-CM

## 2021-01-12 DIAGNOSIS — M12.811 ROTATOR CUFF TEAR ARTHROPATHY OF RIGHT SHOULDER: ICD-10-CM

## 2021-01-12 DIAGNOSIS — Z98.890 S/P ROTATOR CUFF SURGERY: Primary | ICD-10-CM

## 2021-01-12 DIAGNOSIS — Z98.890 S/P RIGHT ROTATOR CUFF REPAIR: Primary | ICD-10-CM

## 2021-01-12 DIAGNOSIS — M75.101 ROTATOR CUFF TEAR ARTHROPATHY OF RIGHT SHOULDER: ICD-10-CM

## 2021-01-12 PROCEDURE — 97162 PT EVAL MOD COMPLEX 30 MIN: CPT

## 2021-01-12 PROCEDURE — 97535 SELF CARE MNGMENT TRAINING: CPT

## 2021-01-12 NOTE — PROGRESS NOTES
PT Evaluation     Today's date: 2021  Patient name: Tex Fink  : 1960  MRN: [id number]  Referring provider: Sally Liao MD  Dx:   Encounter Diagnosis     ICD-10-CM    1  S/P right rotator cuff repair  Z98 890    2  Rotator cuff tear arthropathy of right shoulder  M75 101     M12 811                   Assessment  Assessment details: Pt presents s/p Right RTC Repair on 20  This was second RTC repair as pt fell 20 which resulted in re-injury of prior repair  Reports sling discharged at last MD follow up  Reports continued pain in shoulder  Pt also has neurologic symptoms RUE which are chronic in nature  Pt is to have right carpal tunnel release on 21  PT notes limited ROM and strength all planes of right shoulder  Limited use of UE noted with ADL's however he does report trying to use it as possible  Pt would benefit from PT tx to decrease symptoms and restore normal functional level  Impairments: abnormal or restricted ROM, activity intolerance, impaired physical strength, lacks appropriate home exercise program, pain with function and poor posture      Prognosis: good    Goals  ST  Decrease pain 50% 6 wk  2  Increase shoulder PROM to WNL 6 wk  3  Increase shoulder strength to 3+/5 6 wk  4  Pt will report no difficulty with activity below shoulder level 6 wk  LT  Pt will report no pain 12 wk  2  Increase shoulder AROM to WNL 12 wk  3  Increase shoulder strength to WNL 12 wk  4  Pt will report no limitations with ADL's 8 wk  5    Pt will be able to return to normal work duties 12 wk    Plan  Patient would benefit from: skilled physical therapy and PT eval  Planned modality interventions: cryotherapy, thermotherapy: hydrocollator packs and unattended electrical stimulation  Planned therapy interventions: joint mobilization, manual therapy, neuromuscular re-education, strengthening, stretching, therapeutic activities, therapeutic exercise, flexibility, functional ROM exercises and home exercise program  Frequency: 3x week  Duration in weeks: 12  Treatment plan discussed with: patient        Subjective Evaluation    History of Present Illness  Date of surgery: 2020  Mechanism of injury: Pt presents s/p right RTC Repair on 20  Had prior right RTC repair 19  Pt fell 20 with re-injury to shoulder resulting in re-tear requiring second surgery  Was in sling until 21  Presents today with no sling  Reports continued pain right shoulder  Reports "pins/needles" sensation right hand or numbness  Is to have have right Carpal Tunnel Release on 21  Possible ulnar nerve release also  Reports involuntary movement/twitching RUE for which he is taking Gabapentin  Reports chronic radiating pain RUE from c-spine issues  Reports using RUE as possible with ADL's  Pain  Current pain ratin  At best pain ratin  At worst pain ratin  Location: Right shoulder   Quality: dull ache  Relieving factors: ice and medications    Hand dominance: right  Life stress: Works with [employer]  [address]    Patient Goals  Patient goals for therapy: increased strength, decreased pain, increased motion, return to sport/leisure activities, return to work and independence with ADLs/IADLs          Objective     Postural Observations    Additional Postural Observation Details  Forward rounded shoulders  Forward Head     Observations     Right Shoulder  Positive for incision       Additional Observation Details  Well healing incisions  No cs/s of infection noted and Minimal swelling right olecranon bursa    Tenderness     Right Shoulder  No tenderness     Additional Tenderness Details  Reports no tenderness to palpation  Does report pain deep in shoulder  Reports intermittent tenderness right upper trap region    Active Range of Motion     Right Elbow   Flexion: WFL  Extension: -25 degrees     Passive Range of Motion     Right Shoulder   Flexion: 118 degrees with pain  Abduction: 112 degrees with pain  External rotation 45°: 20 degrees with pain  Internal rotation 45°: 45 degrees with pain    Strength/Myotome Testing     Additional Strength Details  Fair- isometric shoulder strength all planes  Reports no pain             Precautions:  S/p right RTC repair    See MD Protocol        Manuals 1-12-21       PROM right shoulder                                Neuro Re-Ed                                                                 Ther Ex        pendulums        pulleys        shrugs        scap retract        Bicep curl        Table slides  Flex/abd        Lisabeth November all planes        Sub-max Isometrics  Flex/ext, Add, IR                                        HEP Instructed and issued HEP       Ther Activity                        Gait Training                        Modalities        CP/MHP

## 2021-01-12 NOTE — LETTER
2021    [facility]    Patient: Scar Martinez   YOB: 1960   Date of Visit: 2021     Encounter Diagnosis     ICD-10-CM    1  S/P right rotator cuff repair  Z98 890    2  Rotator cuff tear arthropathy of right shoulder  M75 101     M12 811        Dear Dr Gopi Duffy: Thank you for your recent referral of Sacr Martinez  Please review the attached evaluation summary from Farzad's recent visit  Please verify that you agree with the plan of care by signing the attached order  If you have any questions or concerns, please do not hesitate to call  I sincerely appreciate the opportunity to share in the care of one of your patients and hope to have another opportunity to work with you in the near future  Sincerely,    Clara Flores, PT      Referring Provider:      I certify that I have read the below Plan of Care and certify the need for these services furnished under this plan of treatment while under my care  MD Olga Villanue[address] [place] [id number]  Via Fax: [phone]          PT Evaluation     Today's date: 2021  Patient name: Scar Martinez  : 1960  MRN: [id number]  Referring provider: Reena Valenzuela MD  Dx:   Encounter Diagnosis     ICD-10-CM    1  S/P right rotator cuff repair  Z98 890    2  Rotator cuff tear arthropathy of right shoulder  M75 101     M12 811                   Assessment  Assessment details: Pt presents s/p Right RTC Repair on 20  This was second RTC repair as pt fell 20 which resulted in re-injury of prior repair  Reports sling discharged at last MD follow up  Reports continued pain in shoulder  Pt also has neurologic symptoms RUE which are chronic in nature  Pt is to have right carpal tunnel release on 21  PT notes limited ROM and strength all planes of right shoulder  Limited use of UE noted with ADL's however he does report trying to use it as possible  Pt would benefit from PT tx to decrease symptoms and restore normal functional level  Impairments: abnormal or restricted ROM, activity intolerance, impaired physical strength, lacks appropriate home exercise program, pain with function and poor posture      Prognosis: good    Goals  ST  Decrease pain 50% 6 wk  2  Increase shoulder PROM to WNL 6 wk  3  Increase shoulder strength to 3+/5 6 wk  4  Pt will report no difficulty with activity below shoulder level 6 wk  LT  Pt will report no pain 12 wk  2  Increase shoulder AROM to WNL 12 wk  3  Increase shoulder strength to WNL 12 wk  4  Pt will report no limitations with ADL's 8 wk  5  Pt will be able to return to normal work duties 12 wk    Plan  Patient would benefit from: skilled physical therapy and PT eval  Planned modality interventions: cryotherapy, thermotherapy: hydrocollator packs and unattended electrical stimulation  Planned therapy interventions: joint mobilization, manual therapy, neuromuscular re-education, strengthening, stretching, therapeutic activities, therapeutic exercise, flexibility, functional ROM exercises and home exercise program  Frequency: 3x week  Duration in weeks: 12  Treatment plan discussed with: patient        Subjective Evaluation    History of Present Illness  Date of surgery: 2020  Mechanism of injury: Pt presents s/p right RTC Repair on 20  Had prior right RTC repair 19  Pt fell 20 with re-injury to shoulder resulting in re-tear requiring second surgery  Was in sling until 21  Presents today with no sling  Reports continued pain right shoulder  Reports "pins/needles" sensation right hand or numbness  Is to have have right Carpal Tunnel Release on 21  Possible ulnar nerve release also  Reports involuntary movement/twitching RUE for which he is taking Gabapentin    Reports chronic radiating pain RUE from c-spine issues  Reports using RUE as possible with ADL's  Pain  Current pain ratin  At best pain ratin  At worst pain ratin  Location: Right shoulder   Quality: dull ache  Relieving factors: ice and medications    Hand dominance: right  Life stress: Works with [employer]    Patient Goals  Patient goals for therapy: increased strength, decreased pain, increased motion, return to sport/leisure activities, return to work and independence with ADLs/IADLs          Objective     Postural Observations    Additional Postural Observation Details  Forward rounded shoulders  Forward Head     Observations     Right Shoulder  Positive for incision  Additional Observation Details  Well healing incisions  No cs/s of infection noted and Minimal swelling right olecranon bursa    Tenderness     Right Shoulder  No tenderness     Additional Tenderness Details  Reports no tenderness to palpation  Does report pain deep in shoulder  Reports intermittent tenderness right upper trap region    Active Range of Motion     Right Elbow   Flexion: WFL  Extension: -25 degrees     Passive Range of Motion     Right Shoulder   Flexion: 118 degrees with pain  Abduction: 112 degrees with pain  External rotation 45°: 20 degrees with pain  Internal rotation 45°: 45 degrees with pain    Strength/Myotome Testing     Additional Strength Details  Fair- isometric shoulder strength all planes  Reports no pain             Precautions:  S/p right RTC repair    See MD Protocol        Manuals 21       PROM right shoulder                                Neuro Re-Ed                                                                 Ther Ex        pendulums        pulleys        shrugs        scap retract        Bicep curl        Table slides  Flex/abd        Jerolyn Dine all planes        Sub-max Isometrics  Flex/ext, Add, IR                                        HEP Instructed and issued HEP       Ther Activity Gait Training                        Modalities        CP/MHP

## 2021-01-14 ENCOUNTER — OFFICE VISIT (OUTPATIENT)
Dept: PHYSICAL THERAPY | Facility: CLINIC | Age: 61
End: 2021-01-14
Payer: COMMERCIAL

## 2021-01-14 DIAGNOSIS — M75.101 ROTATOR CUFF TEAR ARTHROPATHY OF RIGHT SHOULDER: ICD-10-CM

## 2021-01-14 DIAGNOSIS — Z98.890 S/P RIGHT ROTATOR CUFF REPAIR: Primary | ICD-10-CM

## 2021-01-14 DIAGNOSIS — M12.811 ROTATOR CUFF TEAR ARTHROPATHY OF RIGHT SHOULDER: ICD-10-CM

## 2021-01-14 PROCEDURE — 97110 THERAPEUTIC EXERCISES: CPT

## 2021-01-14 PROCEDURE — 97140 MANUAL THERAPY 1/> REGIONS: CPT

## 2021-01-14 NOTE — PROGRESS NOTES
Daily Note     Today's date: 2021  Patient name: Austen Theodore  : 1960  MRN: [id number]  Referring provider: Caridad Rendon PA-C  Dx:   Encounter Diagnosis     ICD-10-CM    1  S/P right rotator cuff repair  Z98 890    2  Rotator cuff tear arthropathy of right shoulder  M75 101     M12 811                   Subjective: The shoulder is sore  My whole body is hurting today due to the weather  The right hand is numb  Objective: See treatment diary below      Assessment: Tolerated treatment well  MM tightness noted with manual therapy all planes  Continues to report neurologic symptoms RUE and hand  Patient would benefit from continued PT      Plan: Continue per plan of care  Precautions:  Recent Cervical Fusion  Hx Right RTC Repair      Precautions:  S/p right RTC repair    See MD Protocol        Manuals 21      PROM right shoulder  15'                              Neuro Re-Ed                                                                 Ther Ex        pendulums  30x      pulleys  30x      shrugs  30x      scap retract  30x      Bicep curl  30x      Table slides  Flex/abd  20x ea  3-5" hold      Cane AAROM all planes        Sub-max Isometrics  Flex/ext, Add, IR  20x  Ea  5"                                      HEP Instructed and issued HEP       Ther Activity                        Gait Training                        Modalities        CP/MHP  MHP 10'

## 2021-01-19 ENCOUNTER — APPOINTMENT (OUTPATIENT)
Dept: PHYSICAL THERAPY | Facility: CLINIC | Age: 61
End: 2021-01-19
Payer: COMMERCIAL

## 2021-01-20 ENCOUNTER — OFFICE VISIT (OUTPATIENT)
Dept: PHYSICAL THERAPY | Facility: CLINIC | Age: 61
End: 2021-01-20
Payer: COMMERCIAL

## 2021-01-20 DIAGNOSIS — Z98.890 S/P RIGHT ROTATOR CUFF REPAIR: Primary | ICD-10-CM

## 2021-01-20 DIAGNOSIS — M75.101 ROTATOR CUFF TEAR ARTHROPATHY OF RIGHT SHOULDER: ICD-10-CM

## 2021-01-20 DIAGNOSIS — M12.811 ROTATOR CUFF TEAR ARTHROPATHY OF RIGHT SHOULDER: ICD-10-CM

## 2021-01-20 PROCEDURE — 97110 THERAPEUTIC EXERCISES: CPT

## 2021-01-20 PROCEDURE — 97530 THERAPEUTIC ACTIVITIES: CPT

## 2021-01-20 NOTE — PROGRESS NOTES
Daily Note     Today's date: 2021  Patient name: Maximino Doyle  : 1960  MRN: [id number]  Referring provider: Vivian Lloyd MD  Dx:   Encounter Diagnosis     ICD-10-CM    1  S/P right rotator cuff repair  Z98 890    2  Rotator cuff tear arthropathy of right shoulder  M75 101     M12 811                   Subjective: The patient states that he is ok  Objective: See treatment diary below      Assessment: Tolerated treatment fair  Patient exhibited good technique with therapeutic exercises and would benefit from continued PT  The patient was tight in his right shoulder  Plan: Continue per plan of care  Precautions:  Recent Cervical Fusion  Hx Right RTC Repair      Precautions:  S/p right RTC repair    See MD Protocol        Manuals 21     PROM right shoulder  15'                              Neuro Re-Ed                                                                 Ther Ex        pendulums  30x 30x     pulleys  30x 30x     shrugs  30x 30x     scap retract  30x 30x     Bicep curl  30x 30x     Table slides  Flex/abd  20x ea  3-5" hold 20x 5"     Cane AAROM all planes        Sub-max Isometrics  Flex/ext, Add, IR  20x  Ea  5" 20x 5"                                     HEP Instructed and issued HEP       Ther Activity        R Shoulder PROM   15'             Gait Training                        Modalities        CP/MHP  MHP 10' 10' MHP

## 2021-01-21 ENCOUNTER — OFFICE VISIT (OUTPATIENT)
Dept: PHYSICAL THERAPY | Facility: CLINIC | Age: 61
End: 2021-01-21
Payer: COMMERCIAL

## 2021-01-21 DIAGNOSIS — M12.811 ROTATOR CUFF TEAR ARTHROPATHY OF RIGHT SHOULDER: ICD-10-CM

## 2021-01-21 DIAGNOSIS — M75.101 ROTATOR CUFF TEAR ARTHROPATHY OF RIGHT SHOULDER: ICD-10-CM

## 2021-01-21 DIAGNOSIS — Z98.890 S/P RIGHT ROTATOR CUFF REPAIR: Primary | ICD-10-CM

## 2021-01-21 PROCEDURE — 97110 THERAPEUTIC EXERCISES: CPT

## 2021-01-21 PROCEDURE — 97140 MANUAL THERAPY 1/> REGIONS: CPT

## 2021-01-21 NOTE — PROGRESS NOTES
Daily Note     Today's date: 2021  Patient name: Jameson Chávez  : 1960  MRN: [id number]  Referring provider: Alejandro Vasques MD  Dx:   Encounter Diagnosis     ICD-10-CM    1  S/P right rotator cuff repair  Z98 890    2  Rotator cuff tear arthropathy of right shoulder  M75 101     M12 811                   Subjective: It feels the same      Objective: See treatment diary below      Assessment: Tolerated treatment well  Reports increased pain superior/anterior shoulder the last 2-3 days  No mechanism of onset noted  Pt to have right CTR on 21  Patient would benefit from continued PT      Plan: Continue per plan of care  Precautions:  Recent Cervical Fusion  Hx Right RTC Repair      Precautions:  S/p right RTC repair    See MD Protocol        Manuals 21    PROM right shoulder  15'  15'                            Neuro Re-Ed                                                                 Ther Ex        pendulums  30x 30x 30x    pulleys  30x 30x 30x    shrugs  30x 30x 30x    scap retract  30x 30x 30x    Bicep curl  30x 30x 30x    Table slides  Flex/abd  20x ea  3-5" hold 20x 5" 20x ea  5"    Cane AAROM all planes        Sub-max Isometrics  Flex/ext, Add, IR  20x  Ea  5" 20x 5" 20x ea  5"                                    HEP Instructed and issued HEP       Ther Activity        R Shoulder PROM   15'             Gait Training                        Modalities        CP/MHP  MHP 10' 10' MHP 10'MHP

## 2021-01-26 ENCOUNTER — APPOINTMENT (OUTPATIENT)
Dept: PHYSICAL THERAPY | Facility: CLINIC | Age: 61
End: 2021-01-26
Payer: COMMERCIAL

## 2021-01-27 ENCOUNTER — OFFICE VISIT (OUTPATIENT)
Dept: PHYSICAL THERAPY | Facility: CLINIC | Age: 61
End: 2021-01-27
Payer: COMMERCIAL

## 2021-01-27 DIAGNOSIS — Z98.890 S/P RIGHT ROTATOR CUFF REPAIR: Primary | ICD-10-CM

## 2021-01-27 DIAGNOSIS — M12.811 ROTATOR CUFF TEAR ARTHROPATHY OF RIGHT SHOULDER: ICD-10-CM

## 2021-01-27 DIAGNOSIS — M75.101 ROTATOR CUFF TEAR ARTHROPATHY OF RIGHT SHOULDER: ICD-10-CM

## 2021-01-27 PROCEDURE — 97530 THERAPEUTIC ACTIVITIES: CPT

## 2021-01-27 PROCEDURE — 97110 THERAPEUTIC EXERCISES: CPT

## 2021-01-27 NOTE — PROGRESS NOTES
Daily Note     Today's date: 2021  Patient name: Landy Mauricio  : 1960  MRN: [id number]  Referring provider: Chari Araujo MD  Dx:   Encounter Diagnosis     ICD-10-CM    1  S/P right rotator cuff repair  Z98 890    2  Rotator cuff tear arthropathy of right shoulder  M75 101     M12 811                   Subjective: The patient states that he is doing ok  Objective: See treatment diary below      Assessment: Tolerated treatment well  Patient exhibited good technique with therapeutic exercises and would benefit from continued PT  The patient's treatment modified today secondary to having Carpal Tunnel Surgery  Continue to progress as able  Plan: Continue per plan of care  Precautions:  Recent Cervical Fusion  Hx Right RTC Repair      Precautions:  S/p right RTC repair    See MD Protocol        Manuals 21   PROM right shoulder  15'  15'                            Neuro Re-Ed                                                                 Ther Ex        pendulums  30x 30x 30x 30x   pulleys  30x 30x 30x 30x   shrugs  30x 30x 30x 30x   scap retract  30x 30x 30x 30x   Bicep curl  30x 30x 30x 30x   Table slides  Flex/abd  20x ea  3-5" hold 20x 5" 20x ea  5" 20x   Cane AAROM all planes        Sub-max Isometrics  Flex/ext, Add, IR  20x  Ea  5" 20x 5" 20x ea  5" 20x 5" (Modified)   SL ER     20x   Standing Shld Flex     20x in mirror                   HEP Instructed and issued HEP       Ther Activity        R Shoulder PROM   15'  10'           Gait Training                        Modalities        CP/MHP  MHP 10' 10' MHP 10'MHP 5' mhp

## 2021-01-28 ENCOUNTER — OFFICE VISIT (OUTPATIENT)
Dept: PHYSICAL THERAPY | Facility: CLINIC | Age: 61
End: 2021-01-28
Payer: COMMERCIAL

## 2021-01-28 DIAGNOSIS — M75.101 ROTATOR CUFF TEAR ARTHROPATHY OF RIGHT SHOULDER: ICD-10-CM

## 2021-01-28 DIAGNOSIS — Z98.890 S/P RIGHT ROTATOR CUFF REPAIR: Primary | ICD-10-CM

## 2021-01-28 DIAGNOSIS — M12.811 ROTATOR CUFF TEAR ARTHROPATHY OF RIGHT SHOULDER: ICD-10-CM

## 2021-01-28 PROCEDURE — 97530 THERAPEUTIC ACTIVITIES: CPT

## 2021-01-28 PROCEDURE — 97110 THERAPEUTIC EXERCISES: CPT

## 2021-01-28 NOTE — PROGRESS NOTES
Daily Note     Today's date: 2021  Patient name: Jessica Danielle  : 1960  MRN: [id number]  Referring provider: Symone Garcia MD  Dx:   Encounter Diagnosis     ICD-10-CM    1  S/P right rotator cuff repair  Z98 890    2  Rotator cuff tear arthropathy of right shoulder  M75 101     M12 811                   Subjective:  I feel the shoulder is better than the first surgery      Objective: See treatment diary below      Assessment: Tolerated treatment well  Reports falling a few days ago onto outstretched right arm/hand  No increase in shoulder pain noted  MM tightness noted at end ranges  Patient would benefit from continued PT      Plan: Continue per plan of care  Precautions:  Recent Cervical Fusion  Hx Right RTC Repair      Precautions:  S/p right RTC repair    See MD Protocol        Manuals 21   PROM right shoulder  15'  15'                            Neuro Re-Ed                                                                 Ther Ex        pendulums  30x 30x 30x 30x   pulleys 30x 5" 30x 30x 30x 30x   shrugs  30x 30x 30x 30x   scap retract  30x 30x 30x 30x   Bicep curl  30x 30x 30x 30x   Table slides  Flex/abd  20x ea  3-5" hold 20x 5" 20x ea  5" 20x   Wall slides 2x10        Sub-max Isometrics  Flex/ext, Add, IR Ext, ER/IR 10x 5" 20x  Ea  5" 20x 5" 20x ea  5" 20x 5" (Modified)   SL ER/ABD 30x ea ER/ABD    20x   Standing Shld Flex Flex, Abd 2x10 ea    20x in mirror                   HEP        Ther Activity        R Shoulder PROM 15  15  10'           Gait Training                        Modalities        CP/MHP MHP 10' MHP 10' 10' MHP 10'MHP 5' mhp

## 2021-02-01 NOTE — PROGRESS NOTES
Virtual Brief Visit    Assessment/Plan:  1  Cervical radiculopathy    2  Ulnar neuropathy of right upper extremity    3  Chronic right shoulder pain    4  Cervical stenosis of spinal canal       patient is 95% better, denies having any neck pain, he has slight tingling sensation in the right hand from the elbow down at times, he is in follow up with his orthopedic surgeon and spine surgeon, he was advised to keep his blood pressure cholesterol and sugar under control, to go to the hospital if has any worsening symptoms and call me  Otherwise to see me back in 4-6 months and follow up with other physicians  Problem List Items Addressed This Visit     None                Reason for visit is   Chief Complaint   Patient presents with    Cervical radiculopathy    Virtual Brief Visit        Encounter provider Erasmo Harman MD    Provider located at [address] [address]    Recent Visits  No visits were found meeting these conditions  Showing recent visits within past 7 days and meeting all other requirements     Future Appointments  Date Type Provider Dept   02/02/21 Telemedicine Erasmo Harman MD [facility]   Showing future appointments within next 150 days and meeting all other requirements        After connecting through telephone, the patient was identified by name and date of birth  Raz Navarro was informed that this is a telemedicine visit and that the visit is being conducted through telephone  My office door was closed  No one else was in the room  He acknowledged consent and understanding of privacy and security of the platform  The patient has agreed to participate and understands he can discontinue the visit at any time  Patient is aware this is a billable service       Subjective    Raz Navarro is a 62 y o  male  Here in follow-up for his neck pain radiating to the right arm  Associated with numbness and tingling, he is status post cervical surgery for spinal stenosis and cord compression, he has minimal numbness and tingling in the right arm from the elbow down into the ulnar aspect but it is not bothersome, he also had surgery for his right rotator cuff and is feeling much better, he denies any bowel and bladder incontinence, no focal weakness, no vision or speech difficulty, no headaches, no symptoms in the left arm of the left lower extremity, no other neurological complaints        Past Medical History:   Diagnosis Date    Cervical radiculopathy     Diabetes mellitus ([address] )     Humeral fracture     Right    Hyperlipidemia     Hypertension        Past Surgical History:   Procedure Laterality Date    CARPAL TUNNEL RELEASE      CERVICAL SPINE SURGERY  07/15/2020    ACDF C4/C5    CYST REMOVAL      EYE SURGERY      LEG SURGERY      shrapnel removal    ROTATOR CUFF REPAIR Right     ROTATOR CUFF REPAIR Right     second repair right    ULNAR NERVE TRANSPOSITION         Current Outpatient Medications   Medication Sig Dispense Refill    amLODIPine (NORVASC) 10 mg tablet Take 1 tablet by mouth Daily      atorvastatin (LIPITOR) 40 mg tablet TAKE 1 TABLET BY MOUTH NIGHTLY      benazepril (LOTENSIN) 40 MG tablet Take 1 tablet by mouth daily      DULoxetine (Cymbalta) 30 mg delayed release capsule Take 30 mg by mouth Daily       Empagliflozin (Jardiance) 10 MG TABS Take 10 mg by mouth daily      fluticasone (FLONASE) 50 mcg/act nasal spray 2 sprays into each nostril      gabapentin (NEURONTIN) 300 mg capsule gabapentin 300 mg capsule      glipiZIDE (GLUCOTROL) 10 mg tablet Take 10 mg by mouth 2 (two) times a day before meals      ibuprofen (MOTRIN) 800 mg tablet Take 1 tablet by mouth 3 (three) times a day      lisinopril (ZESTRIL) 10 mg tablet Take 10 mg by mouth daily      meloxicam (MOBIC) 15 mg tablet Take 15 mg by mouth daily      metFORMIN (GLUCOPHAGE) 1000 MG tablet Take 1,000 mg by mouth 2 (two) times a day      pantoprazole (PROTONIX) 40 mg tablet TAKE (1) TABLET BY MOUTH ONCE DAILY 30 tablet 3    bisacodyl (DULCOLAX) 5 mg EC tablet Take 4 tablets (20 mg total) by mouth once for 1 dose Colonoscopy prep 4 tablet 0    diazepam (VALIUM) 5 mg tablet Take 1 tab 40 min prior to MRI with a second dose 10 min before imaging (Patient not taking: Reported on 2/1/2021) 2 tablet 0    HYDROcodone-acetaminophen (Norco) 5-325 mg per tablet Take 1 tablet by mouth every 8 (eight) hours as needed       Multiple Vitamin (MULTIVITAMIN) tablet Take 1 tablet by mouth daily      pregabalin (LYRICA) 75 mg capsule pregabalin 75 mg capsule       No current facility-administered medications for this visit  Allergies   Allergen Reactions    Pravastatin GI Intolerance and Rash       Review of Systems   Constitutional: Negative  Negative for appetite change and fever  HENT: Negative  Negative for hearing loss, tinnitus, trouble swallowing and voice change  Eyes: Negative  Negative for photophobia and pain  Respiratory: Negative  Negative for shortness of breath  Cardiovascular: Negative  Negative for palpitations  Gastrointestinal: Positive for nausea (has a virus) and vomiting  Endocrine: Negative  Negative for cold intolerance  Genitourinary: Negative  Negative for dysuria, frequency and urgency  Musculoskeletal: Positive for joint swelling, neck pain and neck stiffness  Negative for myalgias  Skin: Negative  Negative for rash  Neurological: Positive for dizziness (from a virus), weakness and headaches (from a sinus infection)  Negative for tremors, seizures, syncope, facial asymmetry, speech difficulty, light-headedness and numbness  Hematological: Negative  Does not bruise/bleed easily  Psychiatric/Behavioral: Negative  Negative for confusion, hallucinations and sleep disturbance       I have reviewed the past medical history, surgical history, social and family history, current medications, allergies vitals, review of systems, and updated this information as appropriate today  There were no vitals filed for this visit  It was my intent to perform this visit via video technology but the patient was not able to do a video connection so the visit was completed via audio telephone only  I spent 10 minutes directly with the patient during this visit    VIRTUAL VISIT DISCLAIMER    Benito Urena acknowledges that he has consented to an online visit or consultation  He understands that the online visit is based solely on information provided by him, and that, in the absence of a face-to-face physical evaluation by the physician, the diagnosis he receives is both limited and provisional in terms of accuracy and completeness  This is not intended to replace a full medical face-to-face evaluation by the physician  Benito Urena understands and accepts these terms

## 2021-02-02 ENCOUNTER — APPOINTMENT (OUTPATIENT)
Dept: PHYSICAL THERAPY | Facility: CLINIC | Age: 61
End: 2021-02-02
Payer: COMMERCIAL

## 2021-02-02 ENCOUNTER — TELEMEDICINE (OUTPATIENT)
Dept: NEUROLOGY | Facility: CLINIC | Age: 61
End: 2021-02-02
Payer: COMMERCIAL

## 2021-02-02 DIAGNOSIS — G89.29 CHRONIC RIGHT SHOULDER PAIN: ICD-10-CM

## 2021-02-02 DIAGNOSIS — M48.02 CERVICAL STENOSIS OF SPINAL CANAL: ICD-10-CM

## 2021-02-02 DIAGNOSIS — M54.12 RADICULOPATHY, CERVICAL REGION: ICD-10-CM

## 2021-02-02 DIAGNOSIS — M54.12 CERVICAL RADICULOPATHY: Primary | ICD-10-CM

## 2021-02-02 DIAGNOSIS — M25.511 RIGHT SHOULDER PAIN: ICD-10-CM

## 2021-02-02 DIAGNOSIS — G89.29 OTHER CHRONIC PAIN: ICD-10-CM

## 2021-02-02 DIAGNOSIS — M25.511 CHRONIC RIGHT SHOULDER PAIN: ICD-10-CM

## 2021-02-02 DIAGNOSIS — G56.21 LESION OF ULNAR NERVE, RIGHT UPPER LIMB: ICD-10-CM

## 2021-02-02 DIAGNOSIS — G56.21 ULNAR NEUROPATHY OF RIGHT UPPER EXTREMITY: ICD-10-CM

## 2021-02-02 PROCEDURE — G2012 BRIEF CHECK IN BY MD/QHP: HCPCS | Performed by: PSYCHIATRY & NEUROLOGY

## 2021-02-04 ENCOUNTER — OFFICE VISIT (OUTPATIENT)
Dept: PHYSICAL THERAPY | Facility: CLINIC | Age: 61
End: 2021-02-04
Payer: COMMERCIAL

## 2021-02-04 DIAGNOSIS — M75.101 ROTATOR CUFF TEAR ARTHROPATHY OF RIGHT SHOULDER: ICD-10-CM

## 2021-02-04 DIAGNOSIS — M12.811 ROTATOR CUFF TEAR ARTHROPATHY OF RIGHT SHOULDER: ICD-10-CM

## 2021-02-04 DIAGNOSIS — Z98.890 S/P RIGHT ROTATOR CUFF REPAIR: Primary | ICD-10-CM

## 2021-02-04 PROCEDURE — 97530 THERAPEUTIC ACTIVITIES: CPT

## 2021-02-04 PROCEDURE — 97110 THERAPEUTIC EXERCISES: CPT

## 2021-02-04 NOTE — PROGRESS NOTES
Daily Note     Today's date: 2021  Patient name: Austen Theodore  : 1960  MRN: [id number]  Referring provider: Yoni Downing MD  Dx:   Encounter Diagnosis     ICD-10-CM    1  S/P right rotator cuff repair  Z98 890    2  Rotator cuff tear arthropathy of right shoulder  M75 101     M12 811                   Subjective: The shoulder is sore from shoveling  Objective: See treatment diary below      Assessment: Tolerated treatment well  New TE added without complaint  Reports shoulder soreness from shoveling as well as performing exercises with 5# DB at home  Advised to perform light activity only to protect repair  Patient would benefit from continued PT      Plan: Continue per plan of care  Precautions:  Recent Cervical Fusion  Hx Right RTC Repair      Precautions:  S/p right RTC repair    See MD Protocol        Manuals 21   PROM right shoulder    15'                            Neuro Re-Ed                                                                 Ther Ex        pendulums   30x 30x 30x   pulleys 30x 5" 30x 30x 30x 30x   shrugs   30x 30x 30x   scap retract   30x 30x 30x   Bicep curl   30x 30x 30x   LTP/MTP  Red 20x      T-band ER/IR  Red 20x      T-band Add  Red 20x                      Table slides  Flex/abd   20x 5" 20x ea  5" 20x   Wall slides 2x10  10x  5"      Sub-max Isometrics  Flex/ext, Add, IR Ext, ER/IR 10x 5"  20x 5" 20x ea  5" 20x 5" (Modified)   SL ER/ABD 30x ea ER/ABD 30x  ER/ABD   20x   Standing Shld Flex Flex, Abd 2x10 ea With cane  Flex 20x   20x in mirror                   HEP        Ther Activity        R Shoulder PROM 15' 10' 15'  10'           Gait Training                        Modalities        CP/MHP MHP 10' MHP 10' 10' MHP 10'MHP 5' mhp

## 2021-02-05 ENCOUNTER — OFFICE VISIT (OUTPATIENT)
Dept: PHYSICAL THERAPY | Facility: CLINIC | Age: 61
End: 2021-02-05
Payer: COMMERCIAL

## 2021-02-05 DIAGNOSIS — M75.101 ROTATOR CUFF TEAR ARTHROPATHY OF RIGHT SHOULDER: ICD-10-CM

## 2021-02-05 DIAGNOSIS — M12.811 ROTATOR CUFF TEAR ARTHROPATHY OF RIGHT SHOULDER: ICD-10-CM

## 2021-02-05 DIAGNOSIS — Z98.890 S/P RIGHT ROTATOR CUFF REPAIR: Primary | ICD-10-CM

## 2021-02-05 PROCEDURE — 97110 THERAPEUTIC EXERCISES: CPT

## 2021-02-05 PROCEDURE — 97530 THERAPEUTIC ACTIVITIES: CPT

## 2021-02-05 NOTE — PROGRESS NOTES
Daily Note     Today's date: 2021  Patient name: Laqueta Simmonds  : 1960  MRN: [id number]  Referring provider: Rosalia Cosme MD  Dx:   Encounter Diagnosis     ICD-10-CM    1  S/P right rotator cuff repair  Z98 890    2  Rotator cuff tear arthropathy of right shoulder  M75 101     M12 811                   Subjective: The shoulder is tight and sore today  I spent 3 hours yesterday taking out and putting in a new toilet  Objective: See treatment diary below      Assessment: Tolerated treatment well  Reports soreness in shoulder with increased performance of higher level activity  Reports ibuprofen helps to decrease symptoms  Patient would benefit from continued PT      Plan: Continue per plan of care  Precautions:  Recent Cervical Fusion  Hx Right RTC Repair      Precautions:  S/p right RTC repair    See MD Protocol        Manuals 21   PROM right shoulder    15'                            Neuro Re-Ed                                                                 Ther Ex        pendulums    30x 30x   pulleys 30x 5" 30x 30x 30x 30x   shrugs    30x 30x   scap retract    30x 30x   Bicep curl    30x 30x   LTP/MTP  Red 20x Red 20x     T-band ER/IR  Red 20x Red 20x     T-band Add  Red 20x Red 20x                     Table slides  Flex/abd    20x ea  5" 20x   Wall slides 2x10  10x  5" 20x  5"     Sub-max Isometrics  Flex/ext, Add, IR Ext, ER/IR 10x 5"   20x ea  5" 20x 5" (Modified)   SL ER/ABD 30x ea ER/ABD 30x  ER/ABD 30x  ER/ABD  20x   Standing Shld Flex Flex, Abd 2x10 ea With cane  Flex 20x 20x  cane  20x in mirror                   HEP        Ther Activity        R Shoulder PROM 15' 10' 15'  10'           Gait Training                        Modalities        CP/MHP MHP 10' MHP 10' 10' MHP 10'MHP 5' mhp

## 2021-02-09 ENCOUNTER — OFFICE VISIT (OUTPATIENT)
Dept: PHYSICAL THERAPY | Facility: CLINIC | Age: 61
End: 2021-02-09
Payer: COMMERCIAL

## 2021-02-09 DIAGNOSIS — M75.101 ROTATOR CUFF TEAR ARTHROPATHY OF RIGHT SHOULDER: ICD-10-CM

## 2021-02-09 DIAGNOSIS — M12.811 ROTATOR CUFF TEAR ARTHROPATHY OF RIGHT SHOULDER: ICD-10-CM

## 2021-02-09 DIAGNOSIS — Z98.890 S/P RIGHT ROTATOR CUFF REPAIR: Primary | ICD-10-CM

## 2021-02-09 PROCEDURE — 97140 MANUAL THERAPY 1/> REGIONS: CPT

## 2021-02-09 PROCEDURE — 97110 THERAPEUTIC EXERCISES: CPT

## 2021-02-09 PROCEDURE — 97164 PT RE-EVAL EST PLAN CARE: CPT

## 2021-02-09 NOTE — LETTER
2021    [facility]    Patient: Vickie Cadet   YOB: 1960   Date of Visit: 2021     Encounter Diagnosis     ICD-10-CM    1  S/P right rotator cuff repair  Z98 890    2  Rotator cuff tear arthropathy of right shoulder  M75 101     M12 811        Dear Dr Christiano English: Thank you for your recent referral of Vickie Cadet  Please review the attached evaluation summary from Farzad's recent visit  Please verify that you agree with the plan of care by signing the attached order  If you have any questions or concerns, please do not hesitate to call  I sincerely appreciate the opportunity to share in the care of one of your patients and hope to have another opportunity to work with you in the near future  Sincerely,    Waldron Lefort, PT      Referring Provider:      I certify that I have read the below Plan of Care and certify the need for these services furnished under this plan of treatment while under my care  MD Olga [address] [place] [id number]  Via Fax: [phone]          PT RE-EVALUATION     Today's date: 2021  Patient name: Vickie Cadet  : 1960  MRN: [id number]  Referring provider: Dalia Kaufman MD  Dx:   Encounter Diagnosis     ICD-10-CM    1  S/P right rotator cuff repair  Z98 890    2  Rotator cuff tear arthropathy of right shoulder  M75 101     M12 811                   Assessment  Assessment details: Pt presents s/p Right RTC Repair on 20  This was second RTC repair as pt fell 20 which resulted in re-injury of prior repair  Pt had right carpal tunnel release on 21:  Pt demonstrates improved ROM and strength in all planes  Reports continued pain at rest and with activity  Increasing activity levels noted outside PT    Continued neurologic symptoms persist RUE which were present prior to surgery  Continues to require further PT to restore full ROM and strength right shoulder/UE  Pt will beneift from continued PT tx to decrease symptoms and restore normal functional level  Impairments: abnormal or restricted ROM, activity intolerance, impaired physical strength, lacks appropriate home exercise program, pain with function and poor posture      Prognosis: good    Goals  ST  Decrease pain 50% 6 wk  2  Increase shoulder PROM to WNL 6 wk  3  Increase shoulder strength to 3+/5 6 wk  4  Pt will report no difficulty with activity below shoulder level 6 wk (partially met)  LT  Pt will report no pain 12 wk  2  Increase shoulder AROM to WNL 12 wk  3  Increase shoulder strength to WNL 12 wk  4  Pt will report no limitations with ADL's 8 wk  5  Pt will be able to return to normal work duties 12 wk (not met)    Plan  Patient would benefit from: skilled physical therapy and PT eval  Planned modality interventions: cryotherapy, thermotherapy: hydrocollator packs and unattended electrical stimulation  Planned therapy interventions: joint mobilization, manual therapy, neuromuscular re-education, strengthening, stretching, therapeutic activities, therapeutic exercise, flexibility, functional ROM exercises and home exercise program  Frequency: 3x week  Duration in weeks: 12  Treatment plan discussed with: patient        Subjective Evaluation    History of Present Illness  Date of surgery: 2020  Mechanism of injury: Pt presents s/p right RTC Repair on 20  Had prior right RTC repair 19  Pt fell 20 with re-injury to shoulder resulting in re-tear requiring second surgery  Was in sling until 21  Presents today with no sling  Reports continued pain right shoulder  Reports "pins/needles" sensation right hand or numbness  Is to have have right Carpal Tunnel Release on 21  Possible ulnar nerve release also    Reports involuntary movement/twitching RUE for which he is taking Gabapentin  Reports chronic radiating pain RUE from c-spine issues  Reports using RUE as possible with ADL's  Pain  Current pain ratin  At best pain ratin  At worst pain ratin  Location: Right shoulder   Quality: dull ache, tight and throbbing  Relieving factors: medications and heat  Progression: improved    Hand dominance: right  Life stress: Works with [employer]    Patient Goals  Patient goals for therapy: increased strength, decreased pain, increased motion, return to sport/leisure activities, return to work and independence with ADLs/IADLs          Objective     Postural Observations    Additional Postural Observation Details  Forward rounded shoulders  Forward Head     Observations     Right Shoulder  Positive for incision  Additional Observation Details  Minimal swelling right olecranon bursa    Tenderness     Right Shoulder  No tenderness     Active Range of Motion     Right Shoulder   Flexion: 120 degrees     Right Elbow   Flexion: WFL  Extension: -25 degrees     Additional Active Range of Motion Details  ER to right ear  IR to right SI region    Passive Range of Motion     Right Shoulder   Flexion: 155 degrees with pain  Abduction: 130 degrees with pain  External rotation 90°: 60 degrees with pain  Internal rotation 90°: 60 degrees with pain    Strength/Myotome Testing     Right Shoulder     Planes of Motion   Flexion: 3+   Abduction: 3+   External rotation at 0°: 3+   Internal rotation at 0°: 3+     Isolated Muscles   Biceps: 3+   Triceps: 3+        Precautions:  Recent Cervical Fusion  Hx Right RTC Repair      Precautions:  S/p right RTC repair    See MD Protocol        Manuals 21   PROM right shoulder                                Neuro Re-Ed                                                                 Ther Ex        pendulums     30x   pulleys 30x 5" 30x 30x 30x 30x   shrugs     30x   scap retract     30x   Bicep curl     30x   LTP/MTP  Red 20x Red 20x Red 30x    T-band ER/IR  Red 20x Red 20x Red 30x    T-band Add  Red 20x Red 20x Red 30x                    Table slides  Flex/abd     20x   Wall slides 2x10  10x  5" 20x  5"     Sub-max Isometrics  Flex/ext, Add, IR Ext, ER/IR 10x 5"    20x 5" (Modified)   SL ER/ABD 30x ea ER/ABD 30x  ER/ABD 30x  ER/ABD 30x ea 20x   Standing Shld Flex Flex, Abd 2x10 ea With cane  Flex 20x 20x  cane  20x in mirror                   HEP        Ther Activity        R Shoulder PROM 15' 10' 15' 10' 10'           Gait Training                        Modalities        CP/MHP MHP 10' MHP 10' 10' MHP  5' mhp

## 2021-02-09 NOTE — PROGRESS NOTES
PT RE-EVALUATION     Today's date: 2021  Patient name: Perico Tello  : 1960  MRN: [id number]  Referring provider: Maggie Anderson MD  Dx:   Encounter Diagnosis     ICD-10-CM    1  S/P right rotator cuff repair  Z98 890    2  Rotator cuff tear arthropathy of right shoulder  M75 101     M12 811                   Assessment  Assessment details: Pt presents s/p Right RTC Repair on 20  This was second RTC repair as pt fell 20 which resulted in re-injury of prior repair  Pt had right carpal tunnel release on 21:  Pt demonstrates improved ROM and strength in all planes  Reports continued pain at rest and with activity  Increasing activity levels noted outside PT  Continued neurologic symptoms persist RUE which were present prior to surgery  Continues to require further PT to restore full ROM and strength right shoulder/UE  Pt will beneift from continued PT tx to decrease symptoms and restore normal functional level  Impairments: abnormal or restricted ROM, activity intolerance, impaired physical strength, lacks appropriate home exercise program, pain with function and poor posture      Prognosis: good    Goals  ST  Decrease pain 50% 6 wk  2  Increase shoulder PROM to WNL 6 wk  3  Increase shoulder strength to 3+/5 6 wk  4  Pt will report no difficulty with activity below shoulder level 6 wk (partially met)  LT  Pt will report no pain 12 wk  2  Increase shoulder AROM to WNL 12 wk  3  Increase shoulder strength to WNL 12 wk  4  Pt will report no limitations with ADL's 8 wk  5    Pt will be able to return to normal work duties 12 wk (not met)    Plan  Patient would benefit from: skilled physical therapy and PT eval  Planned modality interventions: cryotherapy, thermotherapy: hydrocollator packs and unattended electrical stimulation  Planned therapy interventions: joint mobilization, manual therapy, neuromuscular re-education, strengthening, stretching, therapeutic activities, therapeutic exercise, flexibility, functional ROM exercises and home exercise program  Frequency: 3x week  Duration in weeks: 12  Treatment plan discussed with: patient        Subjective Evaluation    History of Present Illness  Date of surgery: 2020  Mechanism of injury: Pt presents s/p right RTC Repair on 20  Had prior right RTC repair 19  Pt fell 20 with re-injury to shoulder resulting in re-tear requiring second surgery  Was in sling until 21  Presents today with no sling  Reports continued pain right shoulder  Reports "pins/needles" sensation right hand or numbness  Is to have have right Carpal Tunnel Release on 21  Possible ulnar nerve release also  Reports involuntary movement/twitching RUE for which he is taking Gabapentin  Reports chronic radiating pain RUE from c-spine issues  Reports using RUE as possible with ADL's  Pain  Current pain ratin  At best pain ratin  At worst pain ratin  Location: Right shoulder   Quality: dull ache, tight and throbbing  Relieving factors: medications and heat  Progression: improved    Hand dominance: right  Life stress: Works with [employer]    Patient Goals  Patient goals for therapy: increased strength, decreased pain, increased motion, return to sport/leisure activities, return to work and independence with ADLs/IADLs          Objective     Postural Observations    Additional Postural Observation Details  Forward rounded shoulders  Forward Head     Observations     Right Shoulder  Positive for incision       Additional Observation Details  Minimal swelling right olecranon bursa    Tenderness     Right Shoulder  No tenderness     Active Range of Motion     Right Shoulder   Flexion: 120 degrees     Right Elbow   Flexion: WFL  Extension: -25 degrees     Additional Active Range of Motion Details  ER to right ear  IR to right SI region    Passive Range of Motion     Right Shoulder Flexion: 155 degrees with pain  Abduction: 130 degrees with pain  External rotation 90°: 60 degrees with pain  Internal rotation 90°: 60 degrees with pain    Strength/Myotome Testing     Right Shoulder     Planes of Motion   Flexion: 3+   Abduction: 3+   External rotation at 0°: 3+   Internal rotation at 0°: 3+     Isolated Muscles   Biceps: 3+   Triceps: 3+        Precautions:  Recent Cervical Fusion  Hx Right RTC Repair      Precautions:  S/p right RTC repair    See MD Protocol        Manuals 1-28-21 2-4-21 2/5/21 2-9-21 1/27/21   PROM right shoulder                                Neuro Re-Ed                                                                 Ther Ex        pendulums     30x   pulleys 30x 5" 30x 30x 30x 30x   shrugs     30x   scap retract     30x   Bicep curl     30x   LTP/MTP  Red 20x Red 20x Red 30x    T-band ER/IR  Red 20x Red 20x Red 30x    T-band Add  Red 20x Red 20x Red 30x                    Table slides  Flex/abd     20x   Wall slides 2x10  10x  5" 20x  5"     Sub-max Isometrics  Flex/ext, Add, IR Ext, ER/IR 10x 5"    20x 5" (Modified)   SL ER/ABD 30x ea ER/ABD 30x  ER/ABD 30x  ER/ABD 30x ea 20x   Standing Shld Flex Flex, Abd 2x10 ea With cane  Flex 20x 20x  cane  20x in mirror                   HEP        Ther Activity        R Shoulder PROM 15' 10' 15' 10' 10'           Gait Training                        Modalities        CP/MHP MHP 10' MHP 10' 10' MHP  5' mhp

## 2021-02-11 ENCOUNTER — OFFICE VISIT (OUTPATIENT)
Dept: PHYSICAL THERAPY | Facility: CLINIC | Age: 61
End: 2021-02-11
Payer: COMMERCIAL

## 2021-02-11 ENCOUNTER — TRANSCRIBE ORDERS (OUTPATIENT)
Dept: NEUROLOGY | Facility: CLINIC | Age: 61
End: 2021-02-11

## 2021-02-11 DIAGNOSIS — G89.29 OTHER CHRONIC PAIN: ICD-10-CM

## 2021-02-11 DIAGNOSIS — M54.12 RADICULOPATHY, CERVICAL REGION: Primary | ICD-10-CM

## 2021-02-11 DIAGNOSIS — G56.21 LESION OF ULNAR NERVE, RIGHT UPPER LIMB: ICD-10-CM

## 2021-02-11 DIAGNOSIS — M75.101 ROTATOR CUFF TEAR ARTHROPATHY OF RIGHT SHOULDER: ICD-10-CM

## 2021-02-11 DIAGNOSIS — Z98.890 S/P RIGHT ROTATOR CUFF REPAIR: Primary | ICD-10-CM

## 2021-02-11 DIAGNOSIS — M12.811 ROTATOR CUFF TEAR ARTHROPATHY OF RIGHT SHOULDER: ICD-10-CM

## 2021-02-11 DIAGNOSIS — M25.511 RIGHT SHOULDER PAIN: ICD-10-CM

## 2021-02-11 PROCEDURE — 97530 THERAPEUTIC ACTIVITIES: CPT

## 2021-02-11 PROCEDURE — 97110 THERAPEUTIC EXERCISES: CPT

## 2021-02-11 NOTE — PROGRESS NOTES
Daily Note     Today's date: 2021  Patient name: Georgette Martinez  : 1960  MRN: [id number]  Referring provider: Rosa Isela Alva MD  Dx:   Encounter Diagnosis     ICD-10-CM    1  S/P right rotator cuff repair  Z98 890    2  Rotator cuff tear arthropathy of right shoulder  M75 101     M12 811                   Subjective:  Pt reports his shoulder is sore from shoveling      Objective: See treatment diary below      Assessment: Tolerated treatment well  MM tightness noted at end ranges with PROM  No complaints voiced with TE  Patient would benefit from continued PT      Plan: Continue per plan of care  Precautions:  Recent Cervical Fusion  Hx Right RTC Repair      Precautions:  S/p right RTC repair    See MD Protocol        Manuals 21   PROM right shoulder                                Neuro Re-Ed                                                                 Ther Ex        pendulums        pulleys 30x 5" 30x 30x 30x 30x   shrugs     30x   scap retract     30x   Bicep curl     30x   LTP/MTP  Red 20x Red 20x Red 30x Red 30x   T-band ER/IR  Red 20x Red 20x Red 30x Red 30x   T-band Add  Red 20x Red 20x Red 30x Red 30x                   Table slides  Flex/abd        Wall slides 2x10  10x  5" 20x  5"  20x  5"   Sub-max Isometrics  Flex/ext, Add, IR Ext, ER/IR 10x 5"    15x ea  5"   SL ER/ABD 30x ea ER/ABD 30x  ER/ABD 30x  ER/ABD 30x ea 30x   Standing Shld Flex Flex, Abd 2x10 ea With cane  Flex 20x 20x  cane                     HEP        Ther Activity        R Shoulder PROM 15' 10' 15' 10' 10'           Gait Training                        Modalities        CP/MHP MHP 10' MHP 10' 10' MHP  10' MHP

## 2021-02-16 ENCOUNTER — APPOINTMENT (OUTPATIENT)
Dept: PHYSICAL THERAPY | Facility: CLINIC | Age: 61
End: 2021-02-16
Payer: COMMERCIAL

## 2021-02-18 ENCOUNTER — APPOINTMENT (OUTPATIENT)
Dept: PHYSICAL THERAPY | Facility: CLINIC | Age: 61
End: 2021-02-18
Payer: COMMERCIAL

## 2021-02-18 ENCOUNTER — OFFICE VISIT (OUTPATIENT)
Dept: PHYSICAL THERAPY | Facility: CLINIC | Age: 61
End: 2021-02-18
Payer: COMMERCIAL

## 2021-02-18 DIAGNOSIS — M75.101 ROTATOR CUFF TEAR ARTHROPATHY OF RIGHT SHOULDER: ICD-10-CM

## 2021-02-18 DIAGNOSIS — M12.811 ROTATOR CUFF TEAR ARTHROPATHY OF RIGHT SHOULDER: ICD-10-CM

## 2021-02-18 DIAGNOSIS — Z98.890 S/P RIGHT ROTATOR CUFF REPAIR: Primary | ICD-10-CM

## 2021-02-18 PROCEDURE — 97140 MANUAL THERAPY 1/> REGIONS: CPT

## 2021-02-18 PROCEDURE — 97110 THERAPEUTIC EXERCISES: CPT

## 2021-02-18 NOTE — PROGRESS NOTES
Daily Note     Today's date: 2021  Patient name: Gabriela Curry  : 1960  MRN: [id number]  Referring provider: Ashlie Yates MD  Dx:   Encounter Diagnosis     ICD-10-CM    1  S/P right rotator cuff repair  Z98 890    2  Rotator cuff tear arthropathy of right shoulder  M75 101     M12 811                   Subjective: The shoulder is alright  Some good and some bad days      Objective: See treatment diary below      Assessment: Tolerated treatment well  Reports shoveling with soreness afterwards right shoulder  Reports seeing MD for CTS and no issues noted following surgery  MM tightness continues at end ranges  Patient would benefit from continued PT      Plan: Continue per plan of care  Precautions:  Recent Cervical Fusion  Hx Right RTC Repair      Precautions:  S/p right RTC repair    See MD Protocol        Manuals 21   PROM right shoulder                                Neuro Re-Ed                                                                 Ther Ex        pendulums        pulleys 30x 5" 30x 30x 30x 30x   shrugs     30x   scap retract     30x   Bicep curl     30x   LTP/MTP Red 30x Red 20x Red 20x Red 30x Red 30x   T-band ER/IR Red 30x Red 20x Red 20x Red 30x Red 30x   T-band Add Red 30x Red 20x Red 20x Red 30x Red 30x                   Table slides  Flex/abd        Wall slides 2x10  10x  5" 20x  5"  20x  5"   Sub-max Isometrics   20x 5"  Flex/Ext, ER/IR    15x ea  5"   SL ER/ABD 30x ea  ER/ABD 30x  ER/ABD 30x  ER/ABD 30x ea 30x   Standing Shld Flex Flex, Abd 2x10 ea With cane  Flex 20x 20x  cane                     HEP        Ther Activity        R Shoulder PROM 10' 10' 15' 10' 10'           Gait Training                        Modalities        CP/MHP MHP 10' MHP 10' 10' MHP  10' MHP

## 2021-02-19 ENCOUNTER — OFFICE VISIT (OUTPATIENT)
Dept: PHYSICAL THERAPY | Facility: CLINIC | Age: 61
End: 2021-02-19
Payer: COMMERCIAL

## 2021-02-19 DIAGNOSIS — M75.101 ROTATOR CUFF TEAR ARTHROPATHY OF RIGHT SHOULDER: ICD-10-CM

## 2021-02-19 DIAGNOSIS — Z98.890 S/P RIGHT ROTATOR CUFF REPAIR: Primary | ICD-10-CM

## 2021-02-19 DIAGNOSIS — M12.811 ROTATOR CUFF TEAR ARTHROPATHY OF RIGHT SHOULDER: ICD-10-CM

## 2021-02-19 PROCEDURE — 97140 MANUAL THERAPY 1/> REGIONS: CPT

## 2021-02-19 PROCEDURE — 97110 THERAPEUTIC EXERCISES: CPT

## 2021-02-19 NOTE — PROGRESS NOTES
Daily Note     Today's date: 2021  Patient name: Benito Urena  : 1960  MRN: [id number]  Referring provider: Stan Bullock MD  Dx:   Encounter Diagnosis     ICD-10-CM    1  S/P right rotator cuff repair  Z98 890    2  Rotator cuff tear arthropathy of right shoulder  M75 101     M12 811                   Subjective: The shoulder is alright      Objective: See treatment diary below      Assessment: Tolerated treatment well  Will look to progress TE and self stretching next session  Reports shoveling with soreness in shoulder after  Patient would benefit from continued PT      Plan: Continue per plan of care  Precautions:  S/p right RTC repair    See MD Protocol        Manuals 21   PROM right shoulder                                Neuro Re-Ed                                                                 Ther Ex        pendulums        pulleys 30x 5" 30x 30x 30x 30x   shrugs     30x   scap retract     30x   Bicep curl     30x   LTP/MTP Red 30x Red 30x Red 20x Red 30x Red 30x   T-band ER/IR Red 30x Red 30x Red 20x Red 30x Red 30x   T-band Add Red 30x Red 30x Red 20x Red 30x Red 30x                   Table slides  Flex/abd        Wall slides 2x10  20x  5" 20x  5"  20x  5"   Sub-max Isometrics   20x 5"  Flex/Ext, ER/IR 20x  5" Flex/Ext, ER/IR   15x ea  5"   SL ER/ABD 30x ea  ER/ABD 30x  ER/ABD 30x  ER/ABD 30x ea 30x   Standing Shld Flex Flex, Abd 2x10 ea Flex/Abd 20x ea 20x  cane                     HEP        Ther Activity        R Shoulder PROM 10' 10' 15' 10' 10'           Gait Training                        Modalities        CP/MHP MHP 10' MHP 10' 10' MHP  10' MHP

## 2021-02-23 ENCOUNTER — OFFICE VISIT (OUTPATIENT)
Dept: PHYSICAL THERAPY | Facility: CLINIC | Age: 61
End: 2021-02-23
Payer: COMMERCIAL

## 2021-02-23 DIAGNOSIS — M12.811 ROTATOR CUFF TEAR ARTHROPATHY OF RIGHT SHOULDER: ICD-10-CM

## 2021-02-23 DIAGNOSIS — M75.101 ROTATOR CUFF TEAR ARTHROPATHY OF RIGHT SHOULDER: ICD-10-CM

## 2021-02-23 DIAGNOSIS — Z98.890 S/P RIGHT ROTATOR CUFF REPAIR: Primary | ICD-10-CM

## 2021-02-23 PROCEDURE — 97110 THERAPEUTIC EXERCISES: CPT

## 2021-02-23 PROCEDURE — 97140 MANUAL THERAPY 1/> REGIONS: CPT

## 2021-02-23 NOTE — PROGRESS NOTES
Daily Note     Today's date: 2021  Patient name: Jameson Chávez  : 1960  MRN: [id number]  Referring provider: Alejandro Vasques MD  Dx:   Encounter Diagnosis     ICD-10-CM    1  S/P right rotator cuff repair  Z98 890    2  Rotator cuff tear arthropathy of right shoulder  M75 101     M12 811                   Subjective:   I'm doing OK      Objective: See treatment diary below      Assessment: Tolerated treatment well  Reports he is to see surgeon tomorrow  Will look to continue to progress TE to restore strength RUE  Patient would benefit from continued PT      Plan: Continue per plan of care  Precautions:  Recent Cervical Fusion  Hx Right RTC Repair      Precautions:  S/p right RTC repair    See MD Protocol        Manuals 21   PROM right shoulder                                Neuro Re-Ed                                                                 Ther Ex        pendulums        pulleys 30x 5" 30x 30x 30x 30x   shrugs     30x   scap retract     30x   Bicep curl  3#  30x   30x   LTP/MTP Red 30x Red 30x Red 20x Red 30x Red 30x   T-band ER/IR Red 30x Red 30x Red 20x Red 30x Red 30x   T-band Add Red 30x Red 30x Red 20x Red 30x Red 30x                   Table slides  Flex/abd        Wall slides 2x10  20x  5" 20x  5"  20x  5"   Sub-max Isometrics   20x 5"  Flex/Ext, ER/IR 20x  5"  Flex/Ext, ER/IR   15x ea  5"   SL ER/ABD 30x ea  ER/ABD 30x  ER/ABD 30x  ER/ABD 30x ea 30x   Standing Shld Flex Flex, Abd 2x10 ea  20x  cane     UBE  4'  Fwd  90 speed              HEP        Ther Activity        R Shoulder PROM 10' 10' 15' 10' 10'           Gait Training                        Modalities        CP/MHP MHP 10' MHP 10' 10' MHP  10' MHP

## 2021-02-25 ENCOUNTER — OFFICE VISIT (OUTPATIENT)
Dept: PHYSICAL THERAPY | Facility: CLINIC | Age: 61
End: 2021-02-25
Payer: COMMERCIAL

## 2021-02-25 DIAGNOSIS — M75.101 ROTATOR CUFF TEAR ARTHROPATHY OF RIGHT SHOULDER: ICD-10-CM

## 2021-02-25 DIAGNOSIS — M12.811 ROTATOR CUFF TEAR ARTHROPATHY OF RIGHT SHOULDER: ICD-10-CM

## 2021-02-25 DIAGNOSIS — Z98.890 S/P RIGHT ROTATOR CUFF REPAIR: Primary | ICD-10-CM

## 2021-02-25 PROCEDURE — 97140 MANUAL THERAPY 1/> REGIONS: CPT

## 2021-02-25 PROCEDURE — 97110 THERAPEUTIC EXERCISES: CPT

## 2021-02-25 NOTE — PROGRESS NOTES
Daily Note     Today's date: 2021  Patient name: Tex Fink  : 1960  MRN: [id number]  Referring provider: Sally Liao MD  Dx:   Encounter Diagnosis     ICD-10-CM    1  S/P right rotator cuff repair  Z98 890    2  Rotator cuff tear arthropathy of right shoulder  M75 101     M12 811                   Subjective: The shoulder is doing good      Objective: See treatment diary below      Assessment: Tolerated treatment well  Added wt with TE to increase strength to prepare for RTW  Reports MD advised one more month of PT then he can RTW  Fatigue noted but no increase in pain  Patient would benefit from continued PT      Plan: Continue per plan of care  Precautions:  Recent Cervical Fusion  Hx Right RTC Repair      Precautions:  S/p right RTC repair    See MD Protocol        Manuals 21   PROM right shoulder                                Neuro Re-Ed                                                                 Ther Ex        pendulums        pulleys 30x 5" 30x 30x 30x 30x   shrugs   1#  30x  30x   scap retract     30x   Bicep curl  3#  30x 1#  30x  30x   LTP/MTP Red 30x Red 30x Red 30x Red 30x Red 30x   T-band ER/IR Red 30x Red 30x Red 30x Red 30x Red 30x   T-band Add Red 30x Red 30x Red 30x Red 30x Red 30x                   Table slides  Flex/abd        Wall slides 2x10  20x  5"   20x  5"   Sub-max Isometrics   20x 5"  Flex/Ext, ER/IR 20x  5"  Flex/Ext, ER/IR   15x ea  5"   SL ER/ABD 30x ea  ER/ABD 30x  ER/ABD 1#  30x  ER/ABD 30x ea 30x   Standing Shld Flex Flex, Abd 2x10 ea  1#  20x  Flex/Abd ea      UBE  4'  Fwd  90 speed 5'  Fwd  90 speed             HEP        Ther Activity        R Shoulder PROM 10' 10' 10' 10' 10'           Gait Training                        Modalities        CP/MHP MHP 10' MHP 10' 10' MHP  10' MHP

## 2021-03-02 ENCOUNTER — OFFICE VISIT (OUTPATIENT)
Dept: PHYSICAL THERAPY | Facility: CLINIC | Age: 61
End: 2021-03-02
Payer: COMMERCIAL

## 2021-03-02 DIAGNOSIS — M75.101 ROTATOR CUFF TEAR ARTHROPATHY OF RIGHT SHOULDER: ICD-10-CM

## 2021-03-02 DIAGNOSIS — Z98.890 S/P RIGHT ROTATOR CUFF REPAIR: Primary | ICD-10-CM

## 2021-03-02 DIAGNOSIS — M12.811 ROTATOR CUFF TEAR ARTHROPATHY OF RIGHT SHOULDER: ICD-10-CM

## 2021-03-02 PROCEDURE — 97530 THERAPEUTIC ACTIVITIES: CPT

## 2021-03-02 PROCEDURE — 97110 THERAPEUTIC EXERCISES: CPT

## 2021-03-02 NOTE — PROGRESS NOTES
Daily Note     Today's date: 3/2/2021  Patient name: Georgette Martinez  : 1960  MRN: [id number]  Referring provider: Rosa Isela Alva MD  Dx:   Encounter Diagnosis     ICD-10-CM    1  S/P right rotator cuff repair  Z98 890    2  Rotator cuff tear arthropathy of right shoulder  M75 101     M12 811                   Subjective: The shoulder is ok      Objective: See treatment diary below      Assessment: Tolerated treatment well  Improving ROM noted  Tolerating progressive strengthening well  Reports he is still limited with heavier activity outside PT  Patient would benefit from continued PT      Plan: Continue per plan of care  Precautions:  Recent Cervical Fusion  Hx Right RTC Repair      Precautions:  S/p right RTC repair    See MD Protocol        Manuals 2-18-21 2-23-21 2/25/21 3-2-21 2/11/21   PROM right shoulder                                Neuro Re-Ed                                                                 Ther Ex        pendulums        pulleys 30x 5" 30x 30x 30x 30x   shrugs   1#  30x 3#  30x 30x   Bent over row    3#  30x    Bicep curl  3#  30x 1#  30x 3#  30x 30x   LTP/MTP Red 30x Red 30x Red 30x Green 30x Red 30x   T-band ER/IR Red 30x Red 30x Red 30x Green 30x Red 30x   T-band Add Red 30x Red 30x Red 30x Green 30x Red 30x                   Table slides  Flex/abd        Wall slides 2x10  20x  5"  20x  5" 20x  5"   Sub-max Isometrics   20x 5"  Flex/Ext, ER/IR 20x  5"  Flex/Ext, ER/IR   15x ea  5"   SL ER/ABD 30x ea  ER/ABD 30x  ER/ABD 1#  30x  ER/ABD 30x ea  1# 30x   Standing Shld Flex Flex, Abd 2x10 ea  1#  20x  Flex/Abd ea  1#  20x ea Flex/Abd/Scap    UBE  4'  Fwd  90 speed 5'  Fwd  90 speed 5'  Fwd 90 speed            HEP        Ther Activity        R Shoulder PROM 10' 10' 10' 10' 10'           Gait Training                        Modalities        CP/MHP MHP 10' MHP 10' 10' MHP 10'  MHP 10' MHP

## 2021-03-04 ENCOUNTER — OFFICE VISIT (OUTPATIENT)
Dept: PHYSICAL THERAPY | Facility: CLINIC | Age: 61
End: 2021-03-04
Payer: COMMERCIAL

## 2021-03-04 DIAGNOSIS — M75.101 ROTATOR CUFF TEAR ARTHROPATHY OF RIGHT SHOULDER: ICD-10-CM

## 2021-03-04 DIAGNOSIS — M12.811 ROTATOR CUFF TEAR ARTHROPATHY OF RIGHT SHOULDER: ICD-10-CM

## 2021-03-04 DIAGNOSIS — Z98.890 S/P RIGHT ROTATOR CUFF REPAIR: Primary | ICD-10-CM

## 2021-03-04 PROCEDURE — 97110 THERAPEUTIC EXERCISES: CPT

## 2021-03-04 PROCEDURE — 97530 THERAPEUTIC ACTIVITIES: CPT

## 2021-03-04 NOTE — PROGRESS NOTES
Daily Note     Today's date: 3/4/2021  Patient name: Erica Haas  : 1960  MRN: [id number]  Referring provider: Kim Mitchell MD  Dx:   Encounter Diagnosis     ICD-10-CM    1  S/P right rotator cuff repair  Z98 890    2  Rotator cuff tear arthropathy of right shoulder  M75 101     M12 811                   Subjective:  I'm doing alright      Objective: See treatment diary below      Assessment: Tolerated treatment well  No pain noted with TE  Reports continued improvement in use of shoulder/UE outside PT  Patient would benefit from continued PT      Plan: Continue per plan of care  Precautions:  Recent Cervical Fusion  Hx Right RTC Repair      Precautions:  S/p right RTC repair    See MD Protocol        Manuals 2-18-21 2-23-21 2/25/21 3-2-21 3/4/21   PROM right shoulder                                Neuro Re-Ed                                                                 Ther Ex        pendulums        pulleys 30x 5" 30x 30x 30x 30x   shrugs   1#  30x 3#  30x 30x  3#   Bent over row    3#  30x 3#  30x   Bicep curl  3#  30x 1#  30x 3#  30x 30x  3#   LTP/MTP Red 30x Red 30x Red 30x Green 30x Green 30x   T-band ER/IR Red 30x Red 30x Red 30x Green 30x Green 30x   T-band Add Red 30x Red 30x Red 30x Green 30x Green 30x                   Table slides  Flex/abd        Wall slides 2x10  20x  5"  20x  5" 20x  5"   Sub-max Isometrics   20x 5"  Flex/Ext, ER/IR 20x  5"  Flex/Ext, ER/IR      SL ER/ABD 30x ea  ER/ABD 30x  ER/ABD 1#  30x  ER/ABD 30x ea  1# 30x 2#   Standing Shld Flex Flex, Abd 2x10 ea  1#  20x  Flex/Abd ea  1#  20x ea Flex/Abd/Scap 2# 20x ea flex/abd/scap   UBE  4'  Fwd  90 speed 5'  Fwd  90 speed 5'  Fwd 90 speed 5'  Fwd 70 speed           HEP        Ther Activity        R Shoulder PROM 10' 10' 10' 10' 10'           Gait Training                        Modalities        CP/MHP MHP 10' MHP 10' 10' MHP 10'  MHP 10' MHP

## 2021-03-08 DIAGNOSIS — K22.89 ESOPHAGEAL THICKENING: ICD-10-CM

## 2021-03-08 RX ORDER — PANTOPRAZOLE SODIUM 40 MG/1
TABLET, DELAYED RELEASE ORAL
Qty: 30 TABLET | Refills: 10 | Status: SHIPPED | OUTPATIENT
Start: 2021-03-08 | End: 2022-02-10

## 2021-03-09 ENCOUNTER — OFFICE VISIT (OUTPATIENT)
Dept: PHYSICAL THERAPY | Facility: CLINIC | Age: 61
End: 2021-03-09
Payer: COMMERCIAL

## 2021-03-09 DIAGNOSIS — Z98.890 S/P RIGHT ROTATOR CUFF REPAIR: Primary | ICD-10-CM

## 2021-03-09 DIAGNOSIS — M12.811 ROTATOR CUFF TEAR ARTHROPATHY OF RIGHT SHOULDER: ICD-10-CM

## 2021-03-09 DIAGNOSIS — M75.101 ROTATOR CUFF TEAR ARTHROPATHY OF RIGHT SHOULDER: ICD-10-CM

## 2021-03-09 PROCEDURE — 97110 THERAPEUTIC EXERCISES: CPT

## 2021-03-09 PROCEDURE — 97530 THERAPEUTIC ACTIVITIES: CPT

## 2021-03-09 NOTE — PROGRESS NOTES
Daily Note     Today's date: 3/9/2021  Patient name: Gabriela Curry  : 1960  MRN: [id number]  Referring provider: Ashlie Yates MD  Dx:   Encounter Diagnosis     ICD-10-CM    1  S/P right rotator cuff repair  Z98 890    2  Rotator cuff tear arthropathy of right shoulder  M75 101     M12 811                   Subjective: The shoulder is doing ok  Its a 2/10 today      Objective: See treatment diary below      Assessment: Tolerated treatment well  No difficulties noted with TE  Reports attempting to throw a light ball at home with difficulty reported  Patient would benefit from continued PT      Plan: Continue per plan of care  Precautions:  Recent Cervical Fusion  Hx Right RTC Repair      Precautions:  S/p right RTC repair    See MD Protocol        Manuals 3-9-21 2-23-21 2/25/21 3-2-21 3/4/21   PROM right shoulder                                Neuro Re-Ed                                                                 Ther Ex        pendulums        pulleys 30x 5" 30x 30x 30x 30x   shrugs 3#  30x  1#  30x 3#  30x 30x  3#   Bent over row 3#  30x   3#  30x 3#  30x   Bicep curl 3# 30x 3#  30x 1#  30x 3#  30x 30x  3#   LTP/MTP Green 30x Red 30x Red 30x Green 30x Green 30x   T-band ER/IR Green 30x Red 30x Red 30x Green 30x Green 30x   T-band Add Green 30x Red 30x Red 30x Green 30x Green 30x                   Table slides  Flex/abd        Wall slides 2x10  20x  5"  20x  5" 20x  5"   Sub-max Isometrics    20x  5"  Flex/Ext, ER/IR      SL ER/ABD 2#  30x ea  ER/ABD 30x  ER/ABD 1#  30x  ER/ABD 30x ea  1# 30x 2#   Standing Shld Flex Flex, Abd, Scapt  1#  2x10 ea  1#  20x  Flex/Abd ea  1#  20x ea Flex/Abd/Scap 2# 20x ea flex/abd/scap   UBE 6'   4'  Fwd  90 speed 5'  Fwd  90 speed 5'  Fwd 90 speed 5'  Fwd 70 speed           HEP        Ther Activity        R Shoulder PROM 10' 10' 10' 10' 10'           Gait Training                        Modalities        CP/MHP  MHP 10' 10' MHP 10'  MHP 10' MHP

## 2021-03-11 ENCOUNTER — TRANSCRIBE ORDERS (OUTPATIENT)
Dept: PHYSICAL THERAPY | Facility: CLINIC | Age: 61
End: 2021-03-11

## 2021-03-11 ENCOUNTER — OFFICE VISIT (OUTPATIENT)
Dept: PHYSICAL THERAPY | Facility: CLINIC | Age: 61
End: 2021-03-11
Payer: COMMERCIAL

## 2021-03-11 DIAGNOSIS — Z98.890 S/P ROTATOR CUFF SURGERY: Primary | ICD-10-CM

## 2021-03-11 DIAGNOSIS — M12.811 ROTATOR CUFF TEAR ARTHROPATHY OF RIGHT SHOULDER: ICD-10-CM

## 2021-03-11 DIAGNOSIS — Z98.890 S/P RIGHT ROTATOR CUFF REPAIR: Primary | ICD-10-CM

## 2021-03-11 DIAGNOSIS — M75.101 ROTATOR CUFF TEAR ARTHROPATHY OF RIGHT SHOULDER: ICD-10-CM

## 2021-03-11 PROCEDURE — 97164 PT RE-EVAL EST PLAN CARE: CPT

## 2021-03-11 PROCEDURE — 97530 THERAPEUTIC ACTIVITIES: CPT

## 2021-03-11 PROCEDURE — 97110 THERAPEUTIC EXERCISES: CPT

## 2021-03-11 NOTE — PROGRESS NOTES
PT RE-EVALUATION     Today's date: 3/11/2021  Patient name: Ozzy Eddy  : 1960  MRN: [id number]  Referring provider: Asa Ware MD  Dx:   Encounter Diagnosis     ICD-10-CM    1  S/P right rotator cuff repair  Z98 890    2  Rotator cuff tear arthropathy of right shoulder  M75 101     M12 811                   Assessment  Assessment details: Pt presents s/p Right RTC Repair on 20  This was second RTC repair as pt fell 20 which resulted in re-injury of prior repair  Pt had right carpal tunnel release on 1-22-21       3-11-21:  Pt continues to improve with PT tx  Decreased overall pain levels noted  Reports he notes continued improvement in shoulder  Reports improving functional level outside PT  PT notes improved ROM and strength right shoulder all planes  Pt goal is to be vidal to RTW in 3-4 weeks  Pt would benefit from continued PT to restore full normal functional level  Impairments: abnormal or restricted ROM, activity intolerance, impaired physical strength, lacks appropriate home exercise program, pain with function and poor posture      Prognosis: good    Goals  ST  Decrease pain 50% 6 wk  2  Increase shoulder PROM to WNL 6 wk  3  Increase shoulder strength to 3+/5 6 wk  4  Pt will report no difficulty with activity below shoulder level 6 wk (met)  LT  Pt will report no pain 12 wk  2  Increase shoulder AROM to WNL 12 wk  3  Increase shoulder strength to WNL 12 wk  4  Pt will report no limitations with ADL's 8 wk  5    Pt will be able to return to normal work duties 12 wk (partially met)    Plan  Patient would benefit from: skilled physical therapy and PT eval  Planned modality interventions: cryotherapy, thermotherapy: hydrocollator packs and unattended electrical stimulation  Planned therapy interventions: joint mobilization, manual therapy, neuromuscular re-education, strengthening, stretching, therapeutic activities, therapeutic exercise, flexibility, functional ROM exercises and home exercise program  Frequency: 3x week  Duration in weeks: 12  Treatment plan discussed with: patient        Subjective Evaluation    History of Present Illness  Date of surgery: 2020  Mechanism of injury: Pt presents s/p right RTC Repair on 20  Had prior right RTC repair 19  Pt fell 20 with re-injury to shoulder resulting in re-tear requiring second surgery  Was in sling until 21  Presents today with no sling  Reports continued pain right shoulder  Reports "pins/needles" sensation right hand or numbness  Is to have have right Carpal Tunnel Release on 21  Possible ulnar nerve release also  Reports involuntary movement/twitching RUE for which he is taking Gabapentin  Reports chronic radiating pain RUE from c-spine issues  Reports using RUE as possible with ADL's  Pain  Current pain ratin  At best pain ratin  At worst pain rating: 3  Location: Right shoulder   Quality: dull ache, tight and throbbing  Relieving factors: medications and heat  Progression: improved    Hand dominance: right  Life stress: Works with [employer]    Patient Goals  Patient goals for therapy: increased strength, decreased pain, increased motion, return to sport/leisure activities, return to work and independence with ADLs/IADLs          Objective     Postural Observations    Additional Postural Observation Details  Forward rounded shoulders  Forward Head     Observations     Right Shoulder  Positive for incision       Additional Observation Details  Minimal swelling right olecranon bursa    Tenderness     Right Shoulder  No tenderness     Active Range of Motion     Right Shoulder   Flexion: 145 degrees   External rotation BTH: C3   Internal rotation BTB: L4     Right Elbow   Flexion: WFL  Extension: -25 degrees     Passive Range of Motion     Right Shoulder   Flexion: 160 degrees   Abduction: 140 degrees   External rotation 90°: 65 degrees Internal rotation 90°: 70 degrees     Additional Passive Range of Motion Details  MM tightness noted at end ranges    Strength/Myotome Testing     Right Shoulder     Planes of Motion   Flexion: 4   Abduction: 4   External rotation at 0°: 4   Internal rotation at 0°: 4     Isolated Muscles   Biceps: 4   Triceps: 4       Precautions:  S/p right RTC repair    See MD Protocol        Manuals 3-9-21 3-11-21 2/25/21 3-2-21 3/4/21   PROM right shoulder                                Neuro Re-Ed                                                                 Ther Ex        pendulums        pulleys 30x 5" 30x 30x 30x 30x   shrugs 3#  30x 3#  30x 1#  30x 3#  30x 30x  3#   Bent over row 3#  30x 3#  30x  3#  30x 3#  30x   Bicep curl 3# 30x 3#  30x 1#  30x 3#  30x 30x  3#   LTP/MTP Green 30x Green 30x Red 30x Green 30x Green 30x   T-band ER/IR Green 30x Green 30x Red 30x Green 30x Green 30x   T-band Add Green 30x Green 30x Red 30x Green 30x Green 30x                   Table slides  Flex/abd        Wall slides 2x10    20x  5" 20x  5"   Sub-max Isometrics          SL ER/ABD 2#  30x ea  ER/ABD 2#  30x  ER/ABD 1#  30x  ER/ABD 30x ea  1# 30x 2#   Standing Shld Flex/Abd/Scap Flex, Abd, Scapt  1#  2x10 ea 2#  20x ea 1#  20x  Flex/Abd ea  1#  20x ea Flex/Abd/Scap 2# 20x ea flex/abd/scap   UBE 6'   6' fwd 5'  Fwd  90 speed 5'  Fwd 90 speed 5'  Fwd 70 speed           HEP        Ther Activity        R Shoulder PROM 10' 10' 10' 10' 10'           Gait Training                        Modalities        CP/MHP  MHP 10' 10' MHP 10'  MHP 10' MHP

## 2021-03-11 NOTE — LETTER
2021    [facility] [address], [facility]    Patient: Rosibel Bruner   YOB: 1960   Date of Visit: 3/11/2021     Encounter Diagnosis     ICD-10-CM    1  S/P right rotator cuff repair  Z98 890    2  Rotator cuff tear arthropathy of right shoulder  M75 101     M12 811        Dear Dr Lucy Bowman: Thank you for your recent referral of Rosibel Bruner  Please review the attached evaluation summary from Farzad's recent visit  Please verify that you agree with the plan of care by signing the attached order  If you have any questions or concerns, please do not hesitate to call  I sincerely appreciate the opportunity to share in the care of one of your patients and hope to have another opportunity to work with you in the near future  Sincerely,    Elyssa Lehman, PT      Referring Provider:      I certify that I have read the below Plan of Care and certify the need for these services furnished under this plan of treatment while under my care  MD Olga [address] [place] [id number]  Via Fax: [phone]          PT RE-EVALUATION     Today's date: 3/11/2021  Patient name: Rosibel Bruner  : 1960  MRN: [id number]  Referring provider: Chandra Wong MD  Dx:   Encounter Diagnosis     ICD-10-CM    1  S/P right rotator cuff repair  Z98 890    2  Rotator cuff tear arthropathy of right shoulder  M75 101     M12 811                   Assessment  Assessment details: Pt presents s/p Right RTC Repair on 20  This was second RTC repair as pt fell 20 which resulted in re-injury of prior repair  Pt had right carpal tunnel release on 1-22-21       3-11-21:  Pt continues to improve with PT tx  Decreased overall pain levels noted  Reports he notes continued improvement in shoulder  Reports improving functional level outside PT    PT notes improved ROM and strength right shoulder all planes  Pt goal is to be vidal to RTW in 3-4 weeks  Pt would benefit from continued PT to restore full normal functional level  Impairments: abnormal or restricted ROM, activity intolerance, impaired physical strength, lacks appropriate home exercise program, pain with function and poor posture      Prognosis: good    Goals  ST  Decrease pain 50% 6 wk  2  Increase shoulder PROM to WNL 6 wk  3  Increase shoulder strength to 3+/5 6 wk  4  Pt will report no difficulty with activity below shoulder level 6 wk (met)  LT  Pt will report no pain 12 wk  2  Increase shoulder AROM to WNL 12 wk  3  Increase shoulder strength to WNL 12 wk  4  Pt will report no limitations with ADL's 8 wk  5  Pt will be able to return to normal work duties 12 wk (partially met)    Plan  Patient would benefit from: skilled physical therapy and PT eval  Planned modality interventions: cryotherapy, thermotherapy: hydrocollator packs and unattended electrical stimulation  Planned therapy interventions: joint mobilization, manual therapy, neuromuscular re-education, strengthening, stretching, therapeutic activities, therapeutic exercise, flexibility, functional ROM exercises and home exercise program  Frequency: 3x week  Duration in weeks: 12  Treatment plan discussed with: patient        Subjective Evaluation    History of Present Illness  Date of surgery: 2020  Mechanism of injury: Pt presents s/p right RTC Repair on 20  Had prior right RTC repair 19  Pt fell 20 with re-injury to shoulder resulting in re-tear requiring second surgery  Was in sling until 21  Presents today with no sling  Reports continued pain right shoulder  Reports "pins/needles" sensation right hand or numbness  Is to have have right Carpal Tunnel Release on 21  Possible ulnar nerve release also  Reports involuntary movement/twitching RUE for which he is taking Gabapentin    Reports chronic radiating pain RUE from c-spine issues  Reports using RUE as possible with ADL's  Pain  Current pain ratin  At best pain ratin  At worst pain rating: 3  Location: Right shoulder   Quality: dull ache, tight and throbbing  Relieving factors: medications and heat  Progression: improved    Hand dominance: right  Life stress: Works with [employer]    Patient Goals  Patient goals for therapy: increased strength, decreased pain, increased motion, return to sport/leisure activities, return to work and independence with ADLs/IADLs          Objective     Postural Observations    Additional Postural Observation Details  Forward rounded shoulders  Forward Head     Observations     Right Shoulder  Positive for incision  Additional Observation Details  Minimal swelling right olecranon bursa    Tenderness     Right Shoulder  No tenderness     Active Range of Motion     Right Shoulder   Flexion: 145 degrees   External rotation BTH: C3   Internal rotation BTB: L4     Right Elbow   Flexion: WFL  Extension: -25 degrees     Passive Range of Motion     Right Shoulder   Flexion: 160 degrees   Abduction: 140 degrees   External rotation 90°: 65 degrees   Internal rotation 90°: 70 degrees     Additional Passive Range of Motion Details  MM tightness noted at end ranges    Strength/Myotome Testing     Right Shoulder     Planes of Motion   Flexion: 4   Abduction: 4   External rotation at 0°: 4   Internal rotation at 0°: 4     Isolated Muscles   Biceps: 4   Triceps: 4       Precautions:  S/p right RTC repair    See MD Protocol        Manuals 3-9-21 3-11-21 2/25/21 3-2-21 3/4/21   PROM right shoulder                                Neuro Re-Ed                                                                 Ther Ex        pendulums        pulleys 30x 5" 30x 30x 30x 30x   shrugs 3#  30x 3#  30x 1#  30x 3#  30x 30x  3#   Bent over row 3#  30x 3#  30x  3#  30x 3#  30x   Bicep curl 3# 30x 3#  30x 1#  30x 3#  30x 30x  3#   LTP/MTP Green 30x Green 30x Red 30x Green 30x Green 30x   T-band ER/IR Green 30x Green 30x Red 30x Green 30x Green 30x   T-band Add Green 30x Green 30x Red 30x Green 30x Green 30x                   Table slides  Flex/abd        Wall slides 2x10    20x  5" 20x  5"   Sub-max Isometrics          SL ER/ABD 2#  30x ea  ER/ABD 2#  30x  ER/ABD 1#  30x  ER/ABD 30x ea  1# 30x 2#   Standing Shld Flex/Abd/Scap Flex, Abd, Scapt  1#  2x10 ea 2#  20x ea 1#  20x  Flex/Abd ea  1#  20x ea Flex/Abd/Scap 2# 20x ea flex/abd/scap   UBE 6'   6' fwd 5'  Fwd  90 speed 5'  Fwd 90 speed 5'  Fwd 70 speed           HEP        Ther Activity        R Shoulder PROM 10' 10' 10' 10' 10'           Gait Training                        Modalities        CP/MHP  MHP 10' 10' MHP 10'  MHP 10' MHP

## 2021-03-16 ENCOUNTER — OFFICE VISIT (OUTPATIENT)
Dept: PHYSICAL THERAPY | Facility: CLINIC | Age: 61
End: 2021-03-16
Payer: COMMERCIAL

## 2021-03-16 DIAGNOSIS — Z98.890 S/P RIGHT ROTATOR CUFF REPAIR: Primary | ICD-10-CM

## 2021-03-16 DIAGNOSIS — M75.101 ROTATOR CUFF TEAR ARTHROPATHY OF RIGHT SHOULDER: ICD-10-CM

## 2021-03-16 DIAGNOSIS — M12.811 ROTATOR CUFF TEAR ARTHROPATHY OF RIGHT SHOULDER: ICD-10-CM

## 2021-03-16 PROCEDURE — 97530 THERAPEUTIC ACTIVITIES: CPT

## 2021-03-16 PROCEDURE — 97110 THERAPEUTIC EXERCISES: CPT

## 2021-03-16 NOTE — PROGRESS NOTES
Daily Note     Today's date: 3/16/2021  Patient name: Perico Tello  : 1960  MRN: [id number]  Referring provider: Maggie Anderson MD  Dx:   Encounter Diagnosis     ICD-10-CM    1  S/P right rotator cuff repair  Z98 890    2  Rotator cuff tear arthropathy of right shoulder  M75 101     M12 811                   Subjective: The shoulder is alright  I see the surgeon this week  Objective: See treatment diary below      Assessment: Tolerated treatment well  Reports shoulder continues to improve  Increased time noted on UBE  Tx modified due to transportation  Patient would benefit from continued PT      Plan: Continue per plan of care  Precautions:  Recent Cervical Fusion  Hx Right RTC Repair          Precautions:  S/p right RTC repair    See MD Protocol        Manuals 3-9-21 3-11-21 3/16/21 3-2-21 3/4/21   PROM right shoulder                                Neuro Re-Ed                                                                 Ther Ex        pendulums        pulleys 30x 5" 30x 30x 30x 30x   shrugs 3#  30x 3#  30x 2#  30x 3#  30x 30x  3#   Bent over row 3#  30x 3#  30x 2#  30x 3#  30x 3#  30x   Bicep curl 3# 30x 3#  30x 2#  30x 3#  30x 30x  3#   LTP/MTP Green 30x Green 30x  Green 30x Green 30x   T-band ER/IR Green 30x Green 30x  Green 30x Green 30x   T-band Add Green 30x Green 30x  Green 30x Green 30x                   Table slides  Flex/abd        Wall slides 2x10    20x  5" 20x  5"   Sub-max Isometrics          SL ER/ABD 2#  30x ea  ER/ABD 2#  30x  ER/ABD 2#  30x  ER/ABD 30x ea  1# 30x 2#   Standing Shld Flex/Abd/Scap Flex, Abd, Scapt  1#  2x10 ea 2#  20x ea 2#  30x  Flex/Abd ea  1#  20x ea Flex/Abd/Scap 2# 20x ea flex/abd/scap   UBE 6'   6' fwd 10'  Fwd  85 speed 5'  Fwd 90 speed 5'  Fwd 70 speed           HEP        Ther Activity        R Shoulder PROM 10' 10' 10' 10' 10'           Gait Training                        Modalities        CP/MHP  MHP 10'  10'  MHP 10' MHP

## 2021-03-18 ENCOUNTER — OFFICE VISIT (OUTPATIENT)
Dept: PHYSICAL THERAPY | Facility: CLINIC | Age: 61
End: 2021-03-18
Payer: COMMERCIAL

## 2021-03-18 DIAGNOSIS — M12.811 ROTATOR CUFF TEAR ARTHROPATHY OF RIGHT SHOULDER: ICD-10-CM

## 2021-03-18 DIAGNOSIS — Z98.890 S/P RIGHT ROTATOR CUFF REPAIR: Primary | ICD-10-CM

## 2021-03-18 DIAGNOSIS — M75.101 ROTATOR CUFF TEAR ARTHROPATHY OF RIGHT SHOULDER: ICD-10-CM

## 2021-03-18 PROCEDURE — 97530 THERAPEUTIC ACTIVITIES: CPT

## 2021-03-18 PROCEDURE — 97110 THERAPEUTIC EXERCISES: CPT

## 2021-03-18 NOTE — PROGRESS NOTES
Daily Note     Today's date: 3/18/2021  Patient name: Jameson Chávez  : 1960  MRN: [id number]  Referring provider: Alejandro Vasques MD  Dx:   Encounter Diagnosis     ICD-10-CM    1  S/P right rotator cuff repair  Z98 890    2  Rotator cuff tear arthropathy of right shoulder  M75 101     M12 811                   Subjective:   I'm doing ok  I see the doctor next week for the shoulder      Objective: See treatment diary below      Assessment: Tolerated treatment well  Reported BUE fatigue with TE today  No pain noted  Pt hopeful to be cleared for RTW at next MD follow up  Patient would benefit from continued PT      Plan: Continue per plan of care  Precautions:  Recent Cervical Fusion  Hx Right RTC Repair        Precautions:  S/p right RTC repair    See MD Protocol        Manuals 3-9-21 3-11-21 3/16/21 3-18-21 3/4/21   PROM right shoulder                                Neuro Re-Ed                                                                 Ther Ex        pendulums        pulleys 30x 5" 30x 30x 30x 30x   shrugs 3#  30x 3#  30x 2#  30x 3#  30x 30x  3#   Bent over row 3#  30x 3#  30x 2#  30x 3#  30x 3#  30x   Bicep curl 3# 30x 3#  30x 2#  30x 3#  30x 30x  3#   LTP/MTP Green 30x Green 30x  Green 30x Green 30x   T-band ER/IR Green 30x Green 30x  Green 30x Green 30x   T-band Add Green 30x Green 30x  Green 30x Green 30x                   Table slides  Flex/abd        Wall slides 2x10     20x  5"   Sub-max Isometrics          SL ER/ABD 2#  30x ea  ER/ABD 2#  30x  ER/ABD 2#  30x  ER/ABD 30x ea  2#  ER/ABD 30x 2#   Standing Shld Flex/Abd/Scap Flex, Abd, Scapt  1#  2x10 ea 2#  20x ea 2#  30x  Flex/Abd ea  2#  20x ea Flex/Abd/Scap 2# 20x ea flex/abd/scap   UBE 6'   6' fwd 10'  Fwd  85 speed 10'  80 speed 5'  Fwd 70 speed           HEP        Ther Activity        R Shoulder PROM 10' 10' 10' 10' 10'           Gait Training                        Modalities        CP/MHP  MHP 10'  5'  MHP 10' P

## 2021-03-23 ENCOUNTER — OFFICE VISIT (OUTPATIENT)
Dept: PHYSICAL THERAPY | Facility: CLINIC | Age: 61
End: 2021-03-23
Payer: COMMERCIAL

## 2021-03-23 DIAGNOSIS — M12.811 ROTATOR CUFF TEAR ARTHROPATHY OF RIGHT SHOULDER: ICD-10-CM

## 2021-03-23 DIAGNOSIS — M75.101 ROTATOR CUFF TEAR ARTHROPATHY OF RIGHT SHOULDER: ICD-10-CM

## 2021-03-23 DIAGNOSIS — Z98.890 S/P RIGHT ROTATOR CUFF REPAIR: Primary | ICD-10-CM

## 2021-03-23 PROCEDURE — 97530 THERAPEUTIC ACTIVITIES: CPT

## 2021-03-23 PROCEDURE — 97110 THERAPEUTIC EXERCISES: CPT

## 2021-03-23 NOTE — PROGRESS NOTES
Daily Note     Today's date: 3/23/2021  Patient name: Gabriela Curry  : 1960  MRN: [id number]  Referring provider: Ashlie Yates MD  Dx:   Encounter Diagnosis     ICD-10-CM    1  S/P right rotator cuff repair  Z98 890    2  Rotator cuff tear arthropathy of right shoulder  M75 101     M12 811                   Subjective:  I saw the spine surgeon and he said my neck fusion looks good      Objective: See treatment diary below      Assessment: Tolerated treatment well  Reports he feels shoulder is continuing to improve  To see Ortho MD on 3-25-21 with possible clearance to RTW afterwards  Increased wt with TE with no difficulties  Patient would benefit from continued PT      Plan: Continue per plan of care  Precautions:  Recent Cervical Fusion  Hx Right RTC Repair        Precautions:  S/p right RTC repair    See MD Protocol        Manuals 3-9-21 3-11-21 3/16/21 3-18-21 3/23/21   PROM right shoulder                                Neuro Re-Ed                                                                 Ther Ex        pendulums        pulleys 30x 5" 30x 30x 30x 30x   shrugs 3#  30x 3#  30x 2#  30x 3#  30x 30x  3#   Bent over row 3#  30x 3#  30x 2#  30x 3#  30x 3#  30x   Bicep curl 3# 30x 3#  30x 2#  30x 3#  30x 30x  3#   LTP/MTP Green 30x Green 30x  Green 30x Green 30x   T-band ER/IR Green 30x Green 30x  Green 30x Green 30x   T-band Add Green 30x Green 30x  Green 30x Green 30x                   Table slides  Flex/abd        Wall slides 2x10        Sub-max Isometrics          SL ER/ABD 2#  30x ea  ER/ABD 2#  30x  ER/ABD 2#  30x  ER/ABD 30x ea  2#  ER/ABD 30x 3#   Standing Shld Flex/Abd/Scap Flex, Abd, Scapt  1#  2x10 ea 2#  20x ea 2#  30x  Flex/Abd ea  2#  20x ea Flex/Abd/Scap 3# 20x ea flex/abd/scap   UBE 6'   6' fwd 10'  Fwd  85 speed 10'  80 speed 10'  60 speed           HEP        Ther Activity        R Shoulder PROM 10' 10' 10' 10' 10'           Gait Training Modalities        CP/MHP  MHP 10'  5'  MHP 10' MHP

## 2021-03-25 ENCOUNTER — OFFICE VISIT (OUTPATIENT)
Dept: PHYSICAL THERAPY | Facility: CLINIC | Age: 61
End: 2021-03-25
Payer: COMMERCIAL

## 2021-03-25 DIAGNOSIS — Z98.890 S/P RIGHT ROTATOR CUFF REPAIR: Primary | ICD-10-CM

## 2021-03-25 DIAGNOSIS — M75.101 ROTATOR CUFF TEAR ARTHROPATHY OF RIGHT SHOULDER: ICD-10-CM

## 2021-03-25 DIAGNOSIS — M12.811 ROTATOR CUFF TEAR ARTHROPATHY OF RIGHT SHOULDER: ICD-10-CM

## 2021-03-25 PROCEDURE — 97110 THERAPEUTIC EXERCISES: CPT

## 2021-03-25 PROCEDURE — 97530 THERAPEUTIC ACTIVITIES: CPT

## 2021-03-25 NOTE — PROGRESS NOTES
Daily Note     Today's date: 3/25/2021  Patient name: Stephen Cmapbell  : 1960  MRN: [id number]  Referring provider: Rafael Pond MD  Dx:   Encounter Diagnosis     ICD-10-CM    1  S/P right rotator cuff repair  Z98 890    2  Rotator cuff tear arthropathy of right shoulder  M75 101     M12 811                   Subjective: The patient states that he is doing ok  Objective: See treatment diary below      Assessment: Tolerated treatment well  Patient exhibited good technique with therapeutic exercises  The patient did have good ROM in his shoulder  Demonstrated good strength  Patient reported that he is going to be done with therapy  Plan: Patient will continue HEP  Precautions:  Recent Cervical Fusion  Hx Right RTC Repair        Precautions:  S/p right RTC repair    See MD Protocol        Manuals 3-25-21 3-11-21 3/16/21 3-18-21 3/23/21   PROM right shoulder                                Neuro Re-Ed                                                                 Ther Ex        pendulums        pulleys 30x 5" 30x 30x 30x 30x   shrugs 3#  30x 3#  30x 2#  30x 3#  30x 30x  3#   Bent over row 3#  30x 3#  30x 2#  30x 3#  30x 3#  30x   Bicep curl 3# 30x 3#  30x 2#  30x 3#  30x 30x  3#   LTP/MTP Green 30x Green 30x  Green 30x Green 30x   T-band ER/IR Green 30x Green 30x  Green 30x Green 30x   T-band Add Green 30x Green 30x  Green 30x Green 30x                   Table slides  Flex/abd        Wall slides        Sub-max Isometrics          SL ER/ABD 3#  30x ea  ER/ABD 2#  30x  ER/ABD 2#  30x  ER/ABD 30x ea  2#  ER/ABD 30x 3#   Standing Shld Flex/Abd/Scap Flex, Abd, Scapt  3#  2x10 ea 2#  20x ea 2#  30x  Flex/Abd ea  2#  20x ea Flex/Abd/Scap 3# 20x ea flex/abd/scap   UBE 10' 60 RPM 6' fwd 10'  Fwd  85 speed 10'  80 speed 10'  60 speed           HEP        Ther Activity        R Shoulder PROM 10' 10' 10' 10' 10'           Gait Training                        Modalities        CP/MHP MHP 5' MHP 10' 5'  P 10' P

## 2021-03-25 NOTE — PROGRESS NOTES
PT DISCHARGE     Today's date: 3/25/2021  Patient name: Landy Mauricio  : 1960  MRN: [id number]  Referring provider: Chari Araujo MD  Dx:   Encounter Diagnosis     ICD-10-CM    1  S/P right rotator cuff repair  Z98 890    2  Rotator cuff tear arthropathy of right shoulder  M75 101     M12 811        Start Time: 9928  Stop Time: 0945  Total time in clinic (min): 50 minutes    Assessment  Assessment details: Pt presents s/p Right RTC Repair on 20  This was second RTC repair as pt fell 20 which resulted in re-injury of prior repair  Pt had right carpal tunnel release on 1-22-21       3-25-21:  Pt progressed well with PT tx  Decreased overall pain levels noted  PT noted improved ROM and strength right shoulder all planes  Pt reported continued improvement in functional levels outside PT  Pt reported seeing Emily Atkins MD and stated MD advised he can D/C PT on 3-25-21 and cleared to RTW  Pt final session was on 3-25-21 and discharged from PT at that time  Impairments: abnormal or restricted ROM, activity intolerance, impaired physical strength, lacks appropriate home exercise program, pain with function and poor posture      Prognosis: good    Goals  ST  Decrease pain 50% 6 wk  2  Increase shoulder PROM to WNL 6 wk  3  Increase shoulder strength to 3+/5 6 wk  4  Pt will report no difficulty with activity below shoulder level 6 wk (met)  LT  Pt will report no pain 12 wk  2  Increase shoulder AROM to WNL 12 wk  3  Increase shoulder strength to WNL 12 wk  4  Pt will report no limitations with ADL's 8 wk  5    Pt will be able to return to normal work duties 12 wk (partially met/Met)    Plan  Plan details: D/C PT services per MD instruction  Findings per last re-evaluation   Patient would benefit from: skilled physical therapy and PT eval  Planned modality interventions: cryotherapy, thermotherapy: hydrocollator packs and unattended electrical stimulation  Planned therapy interventions: joint mobilization, manual therapy, neuromuscular re-education, strengthening, stretching, therapeutic activities, therapeutic exercise, flexibility, functional ROM exercises and home exercise program  Treatment plan discussed with: patient        Subjective Evaluation    History of Present Illness  Date of surgery: 2020  Mechanism of injury: Pt presents s/p right RTC Repair on 20  Had prior right RTC repair 19  Pt fell 20 with re-injury to shoulder resulting in re-tear requiring second surgery  Was in sling until 21  Presents today with no sling  Reports continued pain right shoulder  Reports "pins/needles" sensation right hand or numbness  Is to have have right Carpal Tunnel Release on 21  Possible ulnar nerve release also  Reports involuntary movement/twitching RUE for which he is taking Gabapentin  Reports chronic radiating pain RUE from c-spine issues  Reports using RUE as possible with ADL's  Pain  Current pain ratin  At best pain ratin  At worst pain rating: 3  Location: Right shoulder   Quality: dull ache, tight and throbbing  Relieving factors: medications and heat  Progression: improved    Hand dominance: right  Life stress: Works with [employer]    Patient Goals  Patient goals for therapy: increased strength, decreased pain, increased motion, return to sport/leisure activities, return to work and independence with ADLs/IADLs          Objective     Postural Observations    Additional Postural Observation Details  Forward rounded shoulders  Forward Head     Observations     Right Shoulder  Positive for incision       Additional Observation Details  Minimal swelling right olecranon bursa    Tenderness     Right Shoulder  No tenderness     Active Range of Motion     Right Shoulder   Flexion: 145 degrees   External rotation BTH: C3   Internal rotation BTB: L4     Right Elbow   Flexion: WFL  Extension: -25 degrees     Passive Range of Motion Right Shoulder   Flexion: 160 degrees   Abduction: 140 degrees   External rotation 90°: 65 degrees   Internal rotation 90°: 70 degrees     Additional Passive Range of Motion Details  MM tightness noted at end ranges    Strength/Myotome Testing     Right Shoulder     Planes of Motion   Flexion: 4   Abduction: 4   External rotation at 0°: 4   Internal rotation at 0°: 4     Isolated Muscles   Biceps: 4   Triceps: 4

## 2021-03-30 ENCOUNTER — APPOINTMENT (OUTPATIENT)
Dept: PHYSICAL THERAPY | Facility: CLINIC | Age: 61
End: 2021-03-30
Payer: COMMERCIAL

## 2021-05-06 ENCOUNTER — TRANSCRIBE ORDERS (OUTPATIENT)
Dept: PHYSICAL THERAPY | Facility: CLINIC | Age: 61
End: 2021-05-06

## 2021-05-06 ENCOUNTER — EVALUATION (OUTPATIENT)
Dept: PHYSICAL THERAPY | Facility: CLINIC | Age: 61
End: 2021-05-06
Payer: COMMERCIAL

## 2021-05-06 DIAGNOSIS — M54.50 LOW BACK PAIN, UNSPECIFIED BACK PAIN LATERALITY, UNSPECIFIED CHRONICITY, UNSPECIFIED WHETHER SCIATICA PRESENT: Primary | ICD-10-CM

## 2021-05-06 PROCEDURE — 97535 SELF CARE MNGMENT TRAINING: CPT

## 2021-05-06 PROCEDURE — 97162 PT EVAL MOD COMPLEX 30 MIN: CPT

## 2021-05-06 NOTE — LETTER
May 11, 2021    James Cote MD  [address] [address]    Patient: Digna Pollard   YOB: 1960   Date of Visit: 2021     Encounter Diagnosis     ICD-10-CM    1  Low back pain, unspecified back pain laterality, unspecified chronicity, unspecified whether sciatica present  M54 5        Dear Dr Price Smith: Thank you for your recent referral of Digna Pollard  Please review the attached evaluation summary from Farzad's recent visit  Please verify that you agree with the plan of care by signing the attached order  If you have any questions or concerns, please do not hesitate to call  I sincerely appreciate the opportunity to share in the care of one of your patients and hope to have another opportunity to work with you in the near future  Sincerely,    Kala Pereira, PT      Referring Provider:      I certify that I have read the below Plan of Care and certify the need for these services furnished under this plan of treatment while under my care  James Cote MD  [address] [place] [id number]  Via Fax: [phone]          PT Evaluation     Today's date: 2021  Patient name: Digna Pollard  : 1960  MRN: [id number]  Referring provider: Emma Flores MD  Dx:   Encounter Diagnosis     ICD-10-CM    1  Low back pain, unspecified back pain laterality, unspecified chronicity, unspecified whether sciatica present  M54 5                   Assessment  Assessment details: Pt presents with new onset LBP  Reports carrying piece of plywood under left arm and turning to right with increased left LBP  Denies symptoms to BLE  Pain primarily left low back  Reports limited mobility and ADL performance due to pain  Pt will benefit from PT tx to decrease symptoms and restore normal functional level      Impairments: abnormal gait, abnormal or restricted ROM, activity intolerance, impaired physical strength, lacks appropriate home exercise program and pain with function    Goals  ST  Decrease pain 50% 6 wk  2  Increase trunk ROM to minimal limitation  6 wk  3  Increase trunk strength to F+/G 6 wk  4  Increase BLE strength to 5/5 6 wk  LT  Pt will report no pain 12 wk  2  Increase trunk ROM to WNL 12 wk  3  Increase trunk strength to Good  12 wk  4  Pt will report no limitations with ADL's 12 wk  5  Pt will report no limitations with ambulation 12 wk    Plan  Patient would benefit from: PT eval and skilled physical therapy  Planned modality interventions: cryotherapy, thermotherapy: hydrocollator packs and unattended electrical stimulation  Planned therapy interventions: joint mobilization, manual therapy, abdominal trunk stabilization, neuromuscular re-education, strengthening, stretching, therapeutic activities, flexibility, therapeutic exercise, functional ROM exercises and home exercise program  Frequency: 3x week  Duration in weeks: 12  Treatment plan discussed with: patient        Subjective Evaluation    History of Present Illness  Mechanism of injury: Pt presents with new onset LBP after carrying piece of plywood under left arm  Reports turning to his right and experiencing increased pain in low back  No pain to BLE  Pain primarily left low back  Pain slowly improving but reports low activity levels  Reports LE's bucking at times (chronic)  Saw Ortho MD and X-ray performed  Given steroid and advised to begin PT      Pain  Current pain rating: 3  At best pain rating: 3  At worst pain rating: 10  Location: Left Low Back  Quality: tight, sharp, dull ache, burning and pulling  Relieving factors: rest  Aggravating factors: sitting          Objective     Concurrent Complaints  Negative for disturbed sleep, bladder dysfunction, bowel dysfunction and saddle (S4) numbness    Tenderness     Additional Tenderness Details  Tenderness noted with palpation left lumbar paraspinal mm  Minimal tenderness noted with palpation right lumbar paraspinal mm    Neurological Testing     Sensation     Lumbar   Left   Intact: light touch    Right   Intact: light touch    Active Range of Motion     Lumbar   Flexion:  Restriction level: moderate  Extension:  Restriction level: maximal  Left lateral flexion:  Restriction level: moderate  Right lateral flexion:  Restriction level: minimal  Left rotation:  Restriction level: minimal  Right rotation:  Restriction level: minimal    Additional Active Range of Motion Details  Pain with extension and left lateral flexion    Strength/Myotome Testing     Left Hip   Planes of Motion   Flexion: 4+  Extension: 4+  Abduction: 4+  Adduction: 4+    Right Hip   Planes of Motion   Flexion: 4+  Extension: 4+  Abduction: 4+  Adduction: 4+    Left Knee   Flexion: 5  Extension: 5    Right Knee   Flexion: 5  Extension: 5    Left Ankle/Foot   Dorsiflexion: 5  Plantar flexion: 5    Right Ankle/Foot   Dorsiflexion: 5  Plantar flexion: 5    Tests     Lumbar   Negative SIJ compression and sacroiliac distraction  Left   Negative passive SLR  Right   Negative passive SLR  Additional Tests Details  Increased pain noted with manual traction of BLE    Ambulation     Observational Gait   Gait: antalgic   Decreased walking speed                Precautions:  Hx cervical fusion, Right RTC Repair       Manuals        L-spine P/A mobs        BLE stretch                        Neuro Re-Ed                                                                 Ther Ex        nustep        PPT        bridges        abd crunch        LTR        Add sq        t-band hip abd        LETICIA        Prone hip ext        St trunk AROM                                Ther Activity                        Gait Training                        Modalities        MHP/IFC

## 2021-05-06 NOTE — PROGRESS NOTES
PT Evaluation     Today's date: 2021  Patient name: Yusuf Constantino  : 1960  MRN: [id number]  Referring provider: Shaye Campos MD  Dx:   Encounter Diagnosis     ICD-10-CM    1  Low back pain, unspecified back pain laterality, unspecified chronicity, unspecified whether sciatica present  M54 5                   Assessment  Assessment details: Pt presents with new onset LBP  Reports carrying piece of plywood under left arm and turning to right with increased left LBP  Denies symptoms to BLE  Pain primarily left low back  Reports limited mobility and ADL performance due to pain  Pt will benefit from PT tx to decrease symptoms and restore normal functional level  Impairments: abnormal gait, abnormal or restricted ROM, activity intolerance, impaired physical strength, lacks appropriate home exercise program and pain with function    Goals  ST  Decrease pain 50% 6 wk  2  Increase trunk ROM to minimal limitation  6 wk  3  Increase trunk strength to F+/G 6 wk  4  Increase BLE strength to 5/5 6 wk  LT  Pt will report no pain 12 wk  2  Increase trunk ROM to WNL 12 wk  3  Increase trunk strength to Good  12 wk  4  Pt will report no limitations with ADL's 12 wk  5    Pt will report no limitations with ambulation 12 wk    Plan  Patient would benefit from: PT eval and skilled physical therapy  Planned modality interventions: cryotherapy, thermotherapy: hydrocollator packs and unattended electrical stimulation  Planned therapy interventions: joint mobilization, manual therapy, abdominal trunk stabilization, neuromuscular re-education, strengthening, stretching, therapeutic activities, flexibility, therapeutic exercise, functional ROM exercises and home exercise program  Frequency: 3x week  Duration in weeks: 12  Treatment plan discussed with: patient        Subjective Evaluation    History of Present Illness  Mechanism of injury: Pt presents with new onset LBP after carrying piece of kimberly under left arm  Reports turning to his right and experiencing increased pain in low back  No pain to BLE  Pain primarily left low back  Pain slowly improving but reports low activity levels  Reports LE's bucking at times (chronic)  Saw Ortho MD and X-ray performed  Given steroid and advised to begin PT  Pain  Current pain rating: 3  At best pain rating: 3  At worst pain rating: 10  Location: Left Low Back  Quality: tight, sharp, dull ache, burning and pulling  Relieving factors: rest  Aggravating factors: sitting          Objective     Concurrent Complaints  Negative for disturbed sleep, bladder dysfunction, bowel dysfunction and saddle (S4) numbness    Tenderness     Additional Tenderness Details  Tenderness noted with palpation left lumbar paraspinal mm  Minimal tenderness noted with palpation right lumbar paraspinal mm    Neurological Testing     Sensation     Lumbar   Left   Intact: light touch    Right   Intact: light touch    Active Range of Motion     Lumbar   Flexion:  Restriction level: moderate  Extension:  Restriction level: maximal  Left lateral flexion:  Restriction level: moderate  Right lateral flexion:  Restriction level: minimal  Left rotation:  Restriction level: minimal  Right rotation:  Restriction level: minimal    Additional Active Range of Motion Details  Pain with extension and left lateral flexion    Strength/Myotome Testing     Left Hip   Planes of Motion   Flexion: 4+  Extension: 4+  Abduction: 4+  Adduction: 4+    Right Hip   Planes of Motion   Flexion: 4+  Extension: 4+  Abduction: 4+  Adduction: 4+    Left Knee   Flexion: 5  Extension: 5    Right Knee   Flexion: 5  Extension: 5    Left Ankle/Foot   Dorsiflexion: 5  Plantar flexion: 5    Right Ankle/Foot   Dorsiflexion: 5  Plantar flexion: 5    Tests     Lumbar   Negative SIJ compression and sacroiliac distraction  Left   Negative passive SLR  Right   Negative passive SLR       Additional Tests Details  Increased pain noted with manual traction of BLE    Ambulation     Observational Gait   Gait: antalgic   Decreased walking speed                Precautions:  Hx cervical fusion, Right RTC Repair       Manuals 5-6-21       L-spine P/A mobs        BLE stretch                        Neuro Re-Ed                                                                 Ther Ex        nustep        PPT        bridges        abd crunch        LTR        Add sq        t-band hip abd        LETICIA        Prone hip ext        St trunk AROM                        HEP Instructed and issued HEP       Ther Activity                        Gait Training                        Modalities        MHP/IFC

## 2021-05-11 ENCOUNTER — OFFICE VISIT (OUTPATIENT)
Dept: PHYSICAL THERAPY | Facility: CLINIC | Age: 61
End: 2021-05-11
Payer: COMMERCIAL

## 2021-05-11 DIAGNOSIS — M54.50 LOW BACK PAIN, UNSPECIFIED BACK PAIN LATERALITY, UNSPECIFIED CHRONICITY, UNSPECIFIED WHETHER SCIATICA PRESENT: Primary | ICD-10-CM

## 2021-05-11 PROCEDURE — 97014 ELECTRIC STIMULATION THERAPY: CPT

## 2021-05-11 PROCEDURE — 97110 THERAPEUTIC EXERCISES: CPT

## 2021-05-11 PROCEDURE — 97140 MANUAL THERAPY 1/> REGIONS: CPT

## 2021-05-11 NOTE — PROGRESS NOTES
Daily Note     Today's date: 2021  Patient name: Phill Ramsey  : 1960  MRN: [id number]  Referring provider: Samuel Alcala MD  Dx:   Encounter Diagnosis     ICD-10-CM    1  Low back pain, unspecified back pain laterality, unspecified chronicity, unspecified whether sciatica present  M54 5                   Subjective: The back feels ok  1/10 pain  I've been taking it easy      Objective: See treatment diary below      Assessment: Tolerated treatment well  Reports avoiding heavy activity at home and resting with decreased symptoms noted  No symptoms noted with TE  Patient would benefit from continued PT      Plan: Continue per plan of care               Precautions:  Hx cervical fusion, Right RTC Repair       Manuals 21      L-spine P/A mobs  10'      BLE stretch                        Neuro Re-Ed                                                                 Ther Ex        nustep  L3  12'      PPT  15x  5"      bridges  20x  3"      abd crunch  2x10      LTR  10x  5"  reyes      Add sq        t-band hip abd        LETICIA        Prone hip ext  2x10 reyes      St trunk AROM  Ext 20x  5"                      HEP Instructed and issued HEP       Ther Activity                        Gait Training                        Modalities        MHP/IFC  15'  prone

## 2021-05-18 ENCOUNTER — OFFICE VISIT (OUTPATIENT)
Dept: PHYSICAL THERAPY | Facility: CLINIC | Age: 61
End: 2021-05-18
Payer: COMMERCIAL

## 2021-05-18 DIAGNOSIS — M54.50 LOW BACK PAIN, UNSPECIFIED BACK PAIN LATERALITY, UNSPECIFIED CHRONICITY, UNSPECIFIED WHETHER SCIATICA PRESENT: Primary | ICD-10-CM

## 2021-05-18 PROCEDURE — 97140 MANUAL THERAPY 1/> REGIONS: CPT

## 2021-05-18 PROCEDURE — 97110 THERAPEUTIC EXERCISES: CPT

## 2021-05-18 PROCEDURE — 97014 ELECTRIC STIMULATION THERAPY: CPT

## 2021-05-18 NOTE — PROGRESS NOTES
Daily Note     Today's date: 2021  Patient name: Dianne Su  : 1960  MRN: [id number]  Referring provider: Moreno Pickens MD  Dx:   Encounter Diagnosis     ICD-10-CM    1  Low back pain, unspecified back pain laterality, unspecified chronicity, unspecified whether sciatica present  M54 5                   Subjective: The back is sore today  I was lifting a freezer yesterday and I feel it today  Objective: See treatment diary below      Assessment: Tolerated treatment well  Reports lifting/moving upright freezer yesterday for a few hours placing wood under it  No increase in symptoms with TE  Patient would benefit from continued PT      Plan: Continue per plan of care         Precautions:  Hx cervical fusion, Right RTC Repair       Manuals 21     L-spine P/A mobs  10' 10'     BLE stretch                        Neuro Re-Ed                                                                 Ther Ex        nustep  L3  12' L4  12'     PPT  15x  5" 15x     bridges  20x  3" 20x 3"     abd crunch  2x10 20x     LTR  10x  5"  reyes 15x  5"     Add sq        t-band hip abd        LETICIA        Prone hip ext  2x10 reyes 20x  Reyes     St trunk AROM  Ext 20x  5"                      HEP Instructed and issued HEP       Ther Activity                        Gait Training                        Modalities        MHP/IFC  15'  prone 15'  prone

## 2021-05-20 ENCOUNTER — OFFICE VISIT (OUTPATIENT)
Dept: PHYSICAL THERAPY | Facility: CLINIC | Age: 61
End: 2021-05-20
Payer: COMMERCIAL

## 2021-05-20 DIAGNOSIS — M54.50 LOW BACK PAIN, UNSPECIFIED BACK PAIN LATERALITY, UNSPECIFIED CHRONICITY, UNSPECIFIED WHETHER SCIATICA PRESENT: Primary | ICD-10-CM

## 2021-05-20 PROCEDURE — 97110 THERAPEUTIC EXERCISES: CPT

## 2021-05-20 PROCEDURE — 97014 ELECTRIC STIMULATION THERAPY: CPT

## 2021-05-20 PROCEDURE — 97140 MANUAL THERAPY 1/> REGIONS: CPT

## 2021-05-20 NOTE — PROGRESS NOTES
Daily Note     Today's date: 2021  Patient name: Fernando Guzman  : 1960  MRN: [id number]  Referring provider: Oni Johnson MD  Dx:   Encounter Diagnosis     ICD-10-CM    1  Low back pain, unspecified back pain laterality, unspecified chronicity, unspecified whether sciatica present  M54 5                   Subjective:  I'm still trying to move the freezer in my yard  Objective: See treatment diary below      Assessment: Tolerated treatment well  Reports increase in LBP with heavier activity at home  Fatigue reported with TE  Patient would benefit from continued PT      Plan: Continue per plan of care          Precautions:  Hx cervical fusion, Right RTC Repair       Manuals 21    L-spine P/A mobs  10' 10' 10'    BLE stretch                        Neuro Re-Ed                                                                 Ther Ex        nustep  L3  12' L4  12' L4  15'    PPT  15x  5" 15x 20x  5"    bridges  20x  3" 20x 3" 320x  3"    abd crunch  2x10 20x 20x    LTR  10x  5"  reyes 15x  5" 20x  5"    Add sq        t-band hip abd        LETICIA        Prone hip ext  2x10 reyes 20x  Reyes 30x  reyes    St trunk AROM  Ext 20x  5"                      HEP Instructed and issued HEP       Ther Activity                        Gait Training                        Modalities        MHP/IFC  15'  prone 15'  prone 15'  prone

## 2021-05-25 ENCOUNTER — OFFICE VISIT (OUTPATIENT)
Dept: PHYSICAL THERAPY | Facility: CLINIC | Age: 61
End: 2021-05-25
Payer: COMMERCIAL

## 2021-05-25 DIAGNOSIS — M54.50 LOW BACK PAIN, UNSPECIFIED BACK PAIN LATERALITY, UNSPECIFIED CHRONICITY, UNSPECIFIED WHETHER SCIATICA PRESENT: Primary | ICD-10-CM

## 2021-05-25 PROCEDURE — 97140 MANUAL THERAPY 1/> REGIONS: CPT

## 2021-05-25 PROCEDURE — 97110 THERAPEUTIC EXERCISES: CPT

## 2021-05-25 PROCEDURE — 97014 ELECTRIC STIMULATION THERAPY: CPT

## 2021-05-25 NOTE — PROGRESS NOTES
Daily Note     Today's date: 2021  Patient name: Kat Palacios  : 1960  MRN: [id number]  Referring provider: Mita Kramer MD  Dx:   Encounter Diagnosis     ICD-10-CM    1  Low back pain, unspecified back pain laterality, unspecified chronicity, unspecified whether sciatica present  M54 5                   Subjective:  I'm sore      Objective: See treatment diary below      Assessment: Tolerated treatment fair  Reports increased pain today low back as well as pain generalized over entire body  Reports no heavy lifting the last few days but more pain  Patient would benefit from continued PT      Plan: Continue per plan of care        Precautions:  Hx cervical fusion, Right RTC Repair       Manuals 21   L-spine P/A mobs  10' 10' 10' 10'   BLE stretch                        Neuro Re-Ed                                                                 Ther Ex        nustep  L3  12' L4  12' L4  15' L5  12'   PPT  15x  5" 15x 20x  5" 20x  5"   bridges  20x  3" 20x 3" 20x  3" 20x  5"   abd crunch  2x10 20x 20x 20x     LTR  10x  5"  reyes 15x  5" 20x  5" 20x  5"   Add sq        t-band hip abd        LETICIA        Prone hip ext  2x10 reyes 20x  Reyes 30x  reyes 20x  reyes   St trunk AROM  Ext 20x  5"                      HEP Instructed and issued HEP       Ther Activity                        Gait Training                        Modalities        MHP/IFC  15'  prone 15'  prone 15'  prone 15'  Prone

## 2021-05-27 ENCOUNTER — OFFICE VISIT (OUTPATIENT)
Dept: PHYSICAL THERAPY | Facility: CLINIC | Age: 61
End: 2021-05-27
Payer: COMMERCIAL

## 2021-05-27 DIAGNOSIS — M54.50 LOW BACK PAIN, UNSPECIFIED BACK PAIN LATERALITY, UNSPECIFIED CHRONICITY, UNSPECIFIED WHETHER SCIATICA PRESENT: Primary | ICD-10-CM

## 2021-05-27 PROCEDURE — 97110 THERAPEUTIC EXERCISES: CPT

## 2021-05-27 PROCEDURE — 97014 ELECTRIC STIMULATION THERAPY: CPT

## 2021-05-27 PROCEDURE — 97140 MANUAL THERAPY 1/> REGIONS: CPT

## 2021-05-27 NOTE — PROGRESS NOTES
Daily Note     Today's date: 2021  Patient name: Dianne Su  : 1960  MRN: [id number]  Referring provider: Moreno Pickens MD  Dx:   Encounter Diagnosis     ICD-10-CM    1  Low back pain, unspecified back pain laterality, unspecified chronicity, unspecified whether sciatica present  M54 5                   Subjective:   I'm ok today  Objective: See treatment diary below      Assessment: Tolerated treatment well  Reports continued varied symptoms in low back region  Difficulty with bridges noted today  BLE stretching added today  Patient would benefit from continued PT      Plan: Continue per plan of care        Precautions:  Hx cervical fusion, Right RTC Repair       Manuals 21   L-spine P/A mobs  10' 10' 10' 10'   BLE stretch 10'                       Neuro Re-Ed                                                                 Ther Ex        nustep L5  15' L3  12' L4  12' L4  15' L5  12'   PPT 20x  5" 15x  5" 15x 20x  5" 20x  5"   bridges  20x  3" 20x 3" 20x  3" 20x  5"   abd crunch 30x 2x10 20x 20x 20x     LTR 20x  5" 10x  5"  reyes 15x  5" 20x  5" 20x  5"   Add sq 30x  5"       t-band hip abd Green 30x 5"       LETICIA        Prone hip ext  2x10 reyes 20x  Reyes 30x  reyes 20x  reyes   St trunk AROM  Ext 20x  5"                      HEP        Ther Activity                        Gait Training                        Modalities        MHP/IFC 15' prone 15'  prone 15'  prone 15'  prone 15'  Prone

## 2021-06-02 ENCOUNTER — OFFICE VISIT (OUTPATIENT)
Dept: PHYSICAL THERAPY | Facility: CLINIC | Age: 61
End: 2021-06-02
Payer: COMMERCIAL

## 2021-06-02 DIAGNOSIS — M54.50 LOW BACK PAIN, UNSPECIFIED BACK PAIN LATERALITY, UNSPECIFIED CHRONICITY, UNSPECIFIED WHETHER SCIATICA PRESENT: Primary | ICD-10-CM

## 2021-06-02 PROCEDURE — 97140 MANUAL THERAPY 1/> REGIONS: CPT

## 2021-06-02 PROCEDURE — 97110 THERAPEUTIC EXERCISES: CPT

## 2021-06-02 PROCEDURE — 97014 ELECTRIC STIMULATION THERAPY: CPT

## 2021-06-02 NOTE — PROGRESS NOTES
Daily Note     Today's date: 2021  Patient name: Claudia Barger  : 1960  MRN: [id number]  Referring provider: Colan Ormond, MD  Dx:   Encounter Diagnosis     ICD-10-CM    1  Low back pain, unspecified back pain laterality, unspecified chronicity, unspecified whether sciatica present  M54 5                   Subjective: It hurts on and off  I feel like I tore a muscle in the back      Objective: See treatment diary below      Assessment: Tolerated treatment well  Reports doing yard work and other heavier ADL's with varied symptoms levels  Decreased e-stim time due to transportation  Patient would benefit from continued PT      Plan: Continue per plan of care        Precautions:  Hx cervical fusion, Right RTC Repair       Manuals 21   L-spine P/A mobs   10' 10' 10'   BLE stretch 10' 10'                      Neuro Re-Ed                                                                 Ther Ex        nustep L5  15' L5  15' L4  12' L4  15' L5  12'   PPT 20x  5" 20x  5" 15x 20x  5" 20x  5"   bridges   20x 3" 20x  3" 20x  5"   abd crunch 30x 3x10 20x 20x 20x     LTR 20x  5" 20x  5"  reyes 15x  5" 20x  5" 20x  5"   Add sq 30x  5"       t-band hip abd Green 30x 5"       LETICIA        Prone hip ext  2x10 reyes 20x  Reyes 30x  reyes 20x  reyes   St trunk Ext  20x  5"                      HEP        Ther Activity                        Gait Training                        Modalities        MHP/IFC 15' prone 10'  prone 15'  prone 15'  prone 15'  Prone

## 2021-06-03 ENCOUNTER — APPOINTMENT (OUTPATIENT)
Dept: PHYSICAL THERAPY | Facility: CLINIC | Age: 61
End: 2021-06-03
Payer: COMMERCIAL

## 2021-06-04 ENCOUNTER — OFFICE VISIT (OUTPATIENT)
Dept: PHYSICAL THERAPY | Facility: CLINIC | Age: 61
End: 2021-06-04
Payer: COMMERCIAL

## 2021-06-04 DIAGNOSIS — M54.50 LOW BACK PAIN, UNSPECIFIED BACK PAIN LATERALITY, UNSPECIFIED CHRONICITY, UNSPECIFIED WHETHER SCIATICA PRESENT: Primary | ICD-10-CM

## 2021-06-04 PROCEDURE — 97110 THERAPEUTIC EXERCISES: CPT

## 2021-06-04 PROCEDURE — 97140 MANUAL THERAPY 1/> REGIONS: CPT

## 2021-06-04 PROCEDURE — 97014 ELECTRIC STIMULATION THERAPY: CPT

## 2021-06-04 NOTE — PROGRESS NOTES
Daily Note     Today's date: 2021  Patient name: Macho Rose  : 1960  MRN: [id number]  Referring provider: Cecile Chaves MD  Dx:   Encounter Diagnosis     ICD-10-CM    1  Low back pain, unspecified back pain laterality, unspecified chronicity, unspecified whether sciatica present  M54 5                   Subjective: My back and left leg are killing me  The leg is giving out  I don't know why  Objective: See treatment diary below      Assessment: Tolerated treatment fair  Reports increased pain left low back and LLE  Antalgic gait noted with decreased WB on LLE  Reports no mechanism of onset  Patient would benefit from continued PT      Plan: Continue per plan of care        Precautions:  Hx cervical fusion, Right RTC Repair       Manuals 21   L-spine P/A mobs    10' 10'   BLE stretch 10' 10' 10'  / LLE traction                     Neuro Re-Ed                                                                 Ther Ex        nustep L5  15' L5  15' L4  12' L4  15' L5  12'   PPT 20x  5" 20x  5" 15x 20x  5" 20x  5"   bridges    20x  3" 20x  5"   abd crunch 30x 3x10 20x 20x 20x     LTR 20x  5" 20x  5"  reyes 20x  5" 20x  5" 20x  5"   Add sq 30x  5"  20x  5"     t-band hip abd Green 30x 5"       LETICIA        Prone hip ext  2x10 reyes 20x  Reyes 30x  reyes 20x  reyes   St trunk Ext  20x  5"                      HEP        Ther Activity                        Gait Training                        Modalities        MHP/IFC 15' prone 10'  prone 15'  prone 15'  prone 15'  Prone

## 2021-06-08 ENCOUNTER — OFFICE VISIT (OUTPATIENT)
Dept: PHYSICAL THERAPY | Facility: CLINIC | Age: 61
End: 2021-06-08
Payer: COMMERCIAL

## 2021-06-08 ENCOUNTER — TRANSCRIBE ORDERS (OUTPATIENT)
Dept: PHYSICAL THERAPY | Facility: CLINIC | Age: 61
End: 2021-06-08

## 2021-06-08 DIAGNOSIS — M54.50 LOW BACK PAIN, UNSPECIFIED BACK PAIN LATERALITY, UNSPECIFIED CHRONICITY, UNSPECIFIED WHETHER SCIATICA PRESENT: Primary | ICD-10-CM

## 2021-06-08 PROCEDURE — 97110 THERAPEUTIC EXERCISES: CPT

## 2021-06-08 PROCEDURE — 97140 MANUAL THERAPY 1/> REGIONS: CPT

## 2021-06-08 PROCEDURE — 97014 ELECTRIC STIMULATION THERAPY: CPT

## 2021-06-08 PROCEDURE — 97164 PT RE-EVAL EST PLAN CARE: CPT

## 2021-06-08 NOTE — PROGRESS NOTES
PT RE-EVALUATION      Today's date: 2021  Patient name: Nicholas Patton  : 1960  MRN: [id number]  Referring provider: Rosemarie Lamb MD  Dx:   Encounter Diagnosis     ICD-10-CM    1  Low back pain, unspecified back pain laterality, unspecified chronicity, unspecified whether sciatica present  M54 5                   Assessment  Assessment details: Pt presented with new onset LBP  Reported carrying piece of plywood under left arm and turning to right with increased left LBP       21:  Pt reports feeling improved with PT tx  Decreased overall pain levels noted however continued intermittent symptoms continue  Pain primarily after higher activity level  PT notes improved trunk ROM all planes  No tenderness to palpation noted presently L-spine  Continues to report difficulty with heavier lifting and prolonged activity due to symptoms  To see MD on 21 for follow up  Pt will benefit from continued PT tx to decrease symptoms and restore normal functional level  Impairments: abnormal gait, abnormal or restricted ROM, activity intolerance, impaired physical strength, lacks appropriate home exercise program and pain with function    Goals  ST  Decrease pain 50% 6 wk  2  Increase trunk ROM to minimal limitation  6 wk  3  Increase trunk strength to F+/G 6 wk  4  Increase BLE strength to 5/5 6 wk  (Met/Partially met)  LT  Pt will report no pain 12 wk  2  Increase trunk ROM to WNL 12 wk  3  Increase trunk strength to Good  12 wk  4  Pt will report no limitations with ADL's 12 wk  5    Pt will report no limitations with ambulation 12 wk  (Partially met)    Plan  Patient would benefit from: PT eval and skilled physical therapy  Planned modality interventions: cryotherapy, thermotherapy: hydrocollator packs and unattended electrical stimulation  Planned therapy interventions: joint mobilization, manual therapy, abdominal trunk stabilization, neuromuscular re-education, strengthening, stretching, therapeutic activities, flexibility, therapeutic exercise, functional ROM exercises and home exercise program  Frequency: 3x week  Duration in weeks: 12  Treatment plan discussed with: patient        Subjective Evaluation    History of Present Illness  Mechanism of injury: Pt presents with new onset LBP after carrying piece of plywood under left arm  Reports turning to his right and experiencing increased pain in low back  No pain to BLE  Pain primarily left low back  Pain slowly improving but reports low activity levels  Reports LE's bucking at times (chronic)  Saw Ortho MD and X-ray performed  Given steroid and advised to begin PT      Pain  Current pain ratin  At best pain ratin  At worst pain ratin  Location: Left Low Back  Quality: sharp and dull ache  Relieving factors: rest  Progression: improved    Patient Goals  Patient goals for therapy: decreased pain          Objective     Concurrent Complaints  Negative for disturbed sleep, bladder dysfunction, bowel dysfunction and saddle (S4) numbness    Tenderness     Additional Tenderness Details  No tenderness to palpation L-spine    Neurological Testing     Sensation     Lumbar   Left   Intact: light touch    Right   Intact: light touch    Active Range of Motion     Lumbar   Flexion:  WFL Restriction level: minimal  Extension:  WFL Restriction level: minimal  Left lateral flexion:  WFL  Right lateral flexion:  WFL  Left rotation:  WFL  Right rotation:  [facility]    Strength/Myotome Testing     Left Hip   Planes of Motion   Flexion: 4+  Extension: 4+  Abduction: 4+  Adduction: 4+    Right Hip   Planes of Motion   Flexion: 4+  Extension: 4+  Abduction: 4+  Adduction: 4+    Left Knee   Flexion: 5  Extension: 5    Right Knee   Flexion: 5  Extension: 5    Left Ankle/Foot   Dorsiflexion: 5  Plantar flexion: 5    Right Ankle/Foot   Dorsiflexion: 5  Plantar flexion: 5    Additional Strength Details  Fair+/Good seated resisted trunk strength    Tests     Lumbar     Left   Negative passive SLR  Right   Negative passive SLR       Additional Tests Details  MM tightness noted BLE    Ambulation     Observational Gait   Gait: within functional limits         Precautions:  Hx cervical fusion, Right RTC Repair       Manuals 5-27-21 6-2-21 6-4-21 6-8-21 5-25-21   L-spine P/A mobs     10'   BLE stretch 10' 10' 10'  / LLE traction 10'                    Neuro Re-Ed                                                                 Ther Ex        nustep L5  15' L5  15' L4  12'  L5  12'   PPT 20x  5" 20x  5" 15x 20x  5" 20x  5"   bridges    20x  5" 20x  5"   abd crunch 30x 3x10 20x 20x 20x     LTR 20x  5" 20x  5"  darrius 20x  5" 20x  5" 20x  5"   Add sq 30x  5"  20x  5"     t-band hip abd Green 30x 5"       LETICIA        Prone hip ext  2x10 darrius 20x  Darrius  20x  darrius   St trunk Ext  20x  5"                      HEP        Ther Activity                        Gait Training                        Modalities        MHP/IFC 15' prone 10'  prone 15'  prone 15'  prone 15'  Prone

## 2021-06-08 NOTE — LETTER
2021    Harjinder Henderson MD  [address] [address]    Patient: Dianne Su   YOB: 1960   Date of Visit: 2021     Encounter Diagnosis     ICD-10-CM    1  Low back pain, unspecified back pain laterality, unspecified chronicity, unspecified whether sciatica present  M54 5        Dear Dr Yenni Allred: Thank you for your recent referral of Dianne Su  Please review the attached evaluation summary from Farzad's recent visit  Please verify that you agree with the plan of care by signing the attached order  If you have any questions or concerns, please do not hesitate to call  I sincerely appreciate the opportunity to share in the care of one of your patients and hope to have another opportunity to work with you in the near future  Sincerely,    Missy Rao, PT      Referring Provider:      I certify that I have read the below Plan of Care and certify the need for these services furnished under this plan of treatment while under my care  Harjinder Henderson MD  [address] [place] [id number]  Via Fax: [phone]          PT RE-EVALUATION      Today's date: 2021  Patient name: Dianne Su  : 1960  MRN: [id number]  Referring provider: Moreno Pickens MD  Dx:   Encounter Diagnosis     ICD-10-CM    1  Low back pain, unspecified back pain laterality, unspecified chronicity, unspecified whether sciatica present  M54 5                   Assessment  Assessment details: Pt presented with new onset LBP  Reported carrying piece of plywood under left arm and turning to right with increased left LBP       21:  Pt reports feeling improved with PT tx  Decreased overall pain levels noted however continued intermittent symptoms continue  Pain primarily after higher activity level  PT notes improved trunk ROM all planes  No tenderness to palpation noted presently L-spine  Continues to report difficulty with heavier lifting and prolonged activity due to symptoms  To see MD on 21 for follow up  Pt will benefit from continued PT tx to decrease symptoms and restore normal functional level  Impairments: abnormal gait, abnormal or restricted ROM, activity intolerance, impaired physical strength, lacks appropriate home exercise program and pain with function    Goals  ST  Decrease pain 50% 6 wk  2  Increase trunk ROM to minimal limitation  6 wk  3  Increase trunk strength to F+/G 6 wk  4  Increase BLE strength to 5/5 6 wk  (Met/Partially met)  LT  Pt will report no pain 12 wk  2  Increase trunk ROM to WNL 12 wk  3  Increase trunk strength to Good  12 wk  4  Pt will report no limitations with ADL's 12 wk  5  Pt will report no limitations with ambulation 12 wk  (Partially met)    Plan  Patient would benefit from: PT eval and skilled physical therapy  Planned modality interventions: cryotherapy, thermotherapy: hydrocollator packs and unattended electrical stimulation  Planned therapy interventions: joint mobilization, manual therapy, abdominal trunk stabilization, neuromuscular re-education, strengthening, stretching, therapeutic activities, flexibility, therapeutic exercise, functional ROM exercises and home exercise program  Frequency: 3x week  Duration in weeks: 12  Treatment plan discussed with: patient        Subjective Evaluation    History of Present Illness  Mechanism of injury: Pt presents with new onset LBP after carrying piece of plywood under left arm  Reports turning to his right and experiencing increased pain in low back  No pain to BLE  Pain primarily left low back  Pain slowly improving but reports low activity levels  Reports LE's bucking at times (chronic)  Saw Ortho MD and X-ray performed  Given steroid and advised to begin PT      Pain  Current pain ratin  At best pain ratin  At worst pain ratin  Location: Left Low Back  Quality: sharp and dull ache  Relieving factors: rest  Progression: improved    Patient Goals  Patient goals for therapy: decreased pain          Objective     Concurrent Complaints  Negative for disturbed sleep, bladder dysfunction, bowel dysfunction and saddle (S4) numbness    Tenderness     Additional Tenderness Details  No tenderness to palpation L-spine    Neurological Testing     Sensation     Lumbar   Left   Intact: light touch    Right   Intact: light touch    Active Range of Motion     Lumbar   Flexion:  WFL Restriction level: minimal  Extension:  WFL Restriction level: minimal  Left lateral flexion:  WFL  Right lateral flexion:  WFL  Left rotation:  WFL  Right rotation:  [facility]    Strength/Myotome Testing     Left Hip   Planes of Motion   Flexion: 4+  Extension: 4+  Abduction: 4+  Adduction: 4+    Right Hip   Planes of Motion   Flexion: 4+  Extension: 4+  Abduction: 4+  Adduction: 4+    Left Knee   Flexion: 5  Extension: 5    Right Knee   Flexion: 5  Extension: 5    Left Ankle/Foot   Dorsiflexion: 5  Plantar flexion: 5    Right Ankle/Foot   Dorsiflexion: 5  Plantar flexion: 5    Additional Strength Details  Fair+/Good seated resisted trunk strength    Tests     Lumbar     Left   Negative passive SLR  Right   Negative passive SLR       Additional Tests Details  MM tightness noted BLE    Ambulation     Observational Gait   Gait: within functional limits         Precautions:  Hx cervical fusion, Right RTC Repair       Manuals 5-27-21 6-2-21 6-4-21 6-8-21 5-25-21   L-spine P/A mobs     10'   BLE stretch 10' 10' 10'  / LLE traction 10'                    Neuro Re-Ed                                                                 Ther Ex        nustep L5  15' L5  15' L4  12'  L5  12'   PPT 20x  5" 20x  5" 15x 20x  5" 20x  5"   bridges    20x  5" 20x  5"   abd crunch 30x 3x10 20x 20x 20x     LTR 20x  5" 20x  5"  reyes 20x  5" 20x  5" 20x  5"   Add sq 30x  5"  20x  5"     t-band hip abd Green 30x 5"       LETICIA Prone hip ext  2x10 darrius 20x  Darrius  20x  darrius   St trunk Ext  20x  5"                      HEP        Ther Activity                        Gait Training                        Modalities        MHP/IFC 15' prone 10'  prone 15'  prone 15'  prone 15'  Prone

## 2021-06-10 ENCOUNTER — OFFICE VISIT (OUTPATIENT)
Dept: PHYSICAL THERAPY | Facility: CLINIC | Age: 61
End: 2021-06-10
Payer: COMMERCIAL

## 2021-06-10 DIAGNOSIS — M54.50 LOW BACK PAIN, UNSPECIFIED BACK PAIN LATERALITY, UNSPECIFIED CHRONICITY, UNSPECIFIED WHETHER SCIATICA PRESENT: Primary | ICD-10-CM

## 2021-06-10 PROCEDURE — 97110 THERAPEUTIC EXERCISES: CPT

## 2021-06-10 PROCEDURE — 97014 ELECTRIC STIMULATION THERAPY: CPT

## 2021-06-10 PROCEDURE — 97140 MANUAL THERAPY 1/> REGIONS: CPT

## 2021-06-10 NOTE — PROGRESS NOTES
PT DISCHARGE        Today's date: 6/10/2021  Patient name: Erick Fields  : 1960  MRN: [id number]  Referring provider: Indy Lyles MD  Dx:   Encounter Diagnosis     ICD-10-CM    1  Low back pain, unspecified back pain laterality, unspecified chronicity, unspecified whether sciatica present  M54 5        Start Time: 0840  Stop Time: 0945  Total time in clinic (min): 65 minutes    Assessment  Assessment details: Pt presented with new onset LBP  Reported carrying piece of plywood under left arm and turning to right with increased left LBP  D/C:  Pt reports feeling improved with PT tx  Decreased overall pain levels noted however continued intermittent symptoms continue  Pain primarily after higher activity level  PT notes improved trunk ROM all planes  No tenderness to palpation noted presently L-spine  Pt last session was on 6-10-21  Pt reports he will be returning to work and feels no further PT tx is needed  D/C PT services at this time per pt request     Impairments: abnormal gait, abnormal or restricted ROM, activity intolerance, impaired physical strength, lacks appropriate home exercise program and pain with function    Goals  ST  Decrease pain 50% 6 wk  2  Increase trunk ROM to minimal limitation  6 wk  3  Increase trunk strength to F+/G 6 wk  4  Increase BLE strength to 5/5 6 wk  (Met/Partially met)  LT  Pt will report no pain 12 wk  2  Increase trunk ROM to WNL 12 wk  3  Increase trunk strength to Good  12 wk  4  Pt will report no limitations with ADL's 12 wk  5    Pt will report no limitations with ambulation 12 wk  (Partially met)    Plan  Plan details: D/C PT services  Findings per last re-evaluation   Patient would benefit from: PT eval and skilled physical therapy  Planned modality interventions: cryotherapy, thermotherapy: hydrocollator packs and unattended electrical stimulation  Planned therapy interventions: joint mobilization, manual therapy, abdominal trunk stabilization, neuromuscular re-education, strengthening, stretching, therapeutic activities, flexibility, therapeutic exercise, functional ROM exercises and home exercise program  Treatment plan discussed with: patient        Subjective Evaluation    History of Present Illness  Mechanism of injury: Pt presents with new onset LBP after carrying piece of plywood under left arm  Reports turning to his right and experiencing increased pain in low back  No pain to BLE  Pain primarily left low back  Pain slowly improving but reports low activity levels  Reports LE's bucking at times (chronic)  Saw Ortho MD and X-ray performed  Given steroid and advised to begin PT      Pain  Current pain ratin  At best pain ratin  At worst pain ratin  Location: Left Low Back  Quality: sharp and dull ache  Relieving factors: rest  Progression: improved    Patient Goals  Patient goals for therapy: decreased pain          Objective     Concurrent Complaints  Negative for disturbed sleep, bladder dysfunction, bowel dysfunction and saddle (S4) numbness    Tenderness     Additional Tenderness Details  No tenderness to palpation L-spine    Neurological Testing     Sensation     Lumbar   Left   Intact: light touch    Right   Intact: light touch    Active Range of Motion     Lumbar   Flexion:  WFL Restriction level: minimal  Extension:  WFL Restriction level: minimal  Left lateral flexion:  WFL  Right lateral flexion:  WFL  Left rotation:  WFL  Right rotation:  [facility]    Strength/Myotome Testing     Left Hip   Planes of Motion   Flexion: 4+  Extension: 4+  Abduction: 4+  Adduction: 4+    Right Hip   Planes of Motion   Flexion: 4+  Extension: 4+  Abduction: 4+  Adduction: 4+    Left Knee   Flexion: 5  Extension: 5    Right Knee   Flexion: 5  Extension: 5    Left Ankle/Foot   Dorsiflexion: 5  Plantar flexion: 5    Right Ankle/Foot   Dorsiflexion: 5  Plantar flexion: 5    Additional Strength Details  Fair+/Good seated resisted trunk strength    Tests     Lumbar     Left   Negative passive SLR  Right   Negative passive SLR       Additional Tests Details  MM tightness noted BLE    Ambulation     Observational Gait   Gait: within functional limits

## 2021-06-10 NOTE — PROGRESS NOTES
Daily Note     Today's date: 6/10/2021  Patient name: Juan José Garcia  : 1960  MRN: [id number]  Referring provider: Manisha Bauer MD  Dx:   Encounter Diagnosis     ICD-10-CM    1  Low back pain, unspecified back pain laterality, unspecified chronicity, unspecified whether sciatica present  M54 5                   Subjective:  Pt reports today will be final day of PT  Reports he will be starting a new job on 21  Reports minimal to no LBP  Objective: See treatment diary below      Assessment: Tolerated treatment well  Reports he is starting new job and today will be final day of PT  Reports no limitations with gait/ADL's  Reports minimal to no pain          Plan: D/C PT per pt      Precautions:  Hx cervical fusion, Right RTC Repair       Manuals 5-27-21 6-2-21 6-4-21 6-8-21 6-10-21   L-spine P/A mobs        BLE stretch 10' 10' 10'  / LLE traction 10' 10'                   Neuro Re-Ed                                                                 Ther Ex        nustep L5  15' L5  15' L4  12'  L5  15'   PPT 20x  5" 20x  5" 15x 20x  5" 20x  5"   bridges    20x  5" 20x  5"   abd crunch 30x 3x10 20x 20x 20x     LTR 20x  5" 20x  5"  reyes 20x  5" 20x  5" 20x  5"   Add sq 30x  5"  20x  5"     t-band hip abd Green 30x 5"       LETICIA        Prone hip ext  2x10 reyes 20x  Reyes     St trunk Ext  20x  5"                      HEP        Ther Activity                        Gait Training                        Modalities        MHP/IFC 15' prone 10'  prone 15'  prone 15'  prone 15'  Prone

## 2021-09-12 ENCOUNTER — HOSPITAL ENCOUNTER (EMERGENCY)
Facility: HOSPITAL | Age: 61
Discharge: HOME | End: 2021-09-12
Attending: EMERGENCY MEDICINE | Admitting: EMERGENCY MEDICINE
Payer: OTHER MISCELLANEOUS

## 2021-09-12 ENCOUNTER — APPOINTMENT (EMERGENCY)
Dept: CT IMAGING | Facility: HOSPITAL | Age: 61
End: 2021-09-12
Payer: OTHER MISCELLANEOUS

## 2021-09-12 ENCOUNTER — APPOINTMENT (EMERGENCY)
Dept: RADIOLOGY | Facility: HOSPITAL | Age: 61
End: 2021-09-12
Payer: OTHER MISCELLANEOUS

## 2021-09-12 VITALS
SYSTOLIC BLOOD PRESSURE: 193 MMHG | TEMPERATURE: 97.7 F | OXYGEN SATURATION: 98 % | DIASTOLIC BLOOD PRESSURE: 87 MMHG | RESPIRATION RATE: 18 BRPM | BODY MASS INDEX: 28.63 KG/M2 | HEART RATE: 65 BPM | HEIGHT: 70 IN | WEIGHT: 200 LBS
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
## 2021-09-12 DIAGNOSIS — S09.90XA CLOSED HEAD INJURY, INITIAL ENCOUNTER: ICD-10-CM

## 2021-09-12 DIAGNOSIS — S62.101A CLOSED FRACTURE OF RIGHT WRIST, INITIAL ENCOUNTER: ICD-10-CM

## 2021-09-12 DIAGNOSIS — M25.562 ACUTE PAIN OF LEFT KNEE: ICD-10-CM

## 2021-09-12 DIAGNOSIS — M25.512 ACUTE PAIN OF LEFT SHOULDER: ICD-10-CM

## 2021-09-12 LAB
ALBUMIN SERPL BCP-MCNC: 3.8 G/DL (ref 3.5–5)
ALP SERPL-CCNC: 45 U/L (ref 46–116)
ALT SERPL W P-5'-P-CCNC: 52 U/L (ref 12–78)
ANION GAP SERPL CALCULATED.3IONS-SCNC: 9 MMOL/L (ref 4–13)
AST SERPL W P-5'-P-CCNC: 25 U/L (ref 5–45)
BASOPHILS # BLD AUTO: 0.03 THOUSANDS/ΜL (ref 0–0.1)
BASOPHILS NFR BLD AUTO: 1 % (ref 0–1)
BILIRUB SERPL-MCNC: 0.41 MG/DL (ref 0.2–1)
BUN SERPL-MCNC: 19 MG/DL (ref 5–25)
CALCIUM SERPL-MCNC: 8.7 MG/DL (ref 8.3–10.1)
CHLORIDE SERPL-SCNC: 105 MMOL/L (ref 100–108)
CO2 SERPL-SCNC: 27 MMOL/L (ref 21–32)
CREAT SERPL-MCNC: 0.87 MG/DL (ref 0.6–1.3)
EOSINOPHIL # BLD AUTO: 0.12 THOUSAND/ΜL (ref 0–0.61)
EOSINOPHIL NFR BLD AUTO: 2 % (ref 0–6)
ERYTHROCYTE [DISTWIDTH] IN BLOOD BY AUTOMATED COUNT: 13.4 % (ref 11.6–15.1)
GFR SERPL CREATININE-BSD FRML MDRD: 94 ML/MIN/1.73SQ M
GLUCOSE SERPL-MCNC: 157 MG/DL (ref 65–140)
HCT VFR BLD AUTO: 44.6 % (ref 36.5–49.3)
HGB BLD-MCNC: 15 G/DL (ref 12–17)
IMM GRANULOCYTES # BLD AUTO: 0.01 THOUSAND/UL (ref 0–0.2)
IMM GRANULOCYTES NFR BLD AUTO: 0 % (ref 0–2)
LYMPHOCYTES # BLD AUTO: 2.06 THOUSANDS/ΜL (ref 0.6–4.47)
LYMPHOCYTES NFR BLD AUTO: 33 % (ref 14–44)
MCH RBC QN AUTO: 29.9 PG (ref 26.8–34.3)
MCHC RBC AUTO-ENTMCNC: 33.6 G/DL (ref 31.4–37.4)
MCV RBC AUTO: 89 FL (ref 82–98)
MONOCYTES # BLD AUTO: 0.93 THOUSAND/ΜL (ref 0.17–1.22)
MONOCYTES NFR BLD AUTO: 15 % (ref 4–12)
NEUTROPHILS # BLD AUTO: 3.04 THOUSANDS/ΜL (ref 1.85–7.62)
NEUTS SEG NFR BLD AUTO: 49 % (ref 43–75)
NRBC BLD AUTO-RTO: 0 /100 WBCS
PLATELET # BLD AUTO: 221 THOUSANDS/UL (ref 149–390)
PMV BLD AUTO: 10.7 FL (ref 8.9–12.7)
POTASSIUM SERPL-SCNC: 4 MMOL/L (ref 3.5–5.3)
PROT SERPL-MCNC: 7.3 G/DL (ref 6.4–8.2)
RBC # BLD AUTO: 5.02 MILLION/UL (ref 3.88–5.62)
SODIUM SERPL-SCNC: 141 MMOL/L (ref 136–145)
TROPONIN I SERPL-MCNC: <0.02 NG/ML
WBC # BLD AUTO: 6.19 THOUSAND/UL (ref 4.31–10.16)

## 2021-09-12 PROCEDURE — 70450 CT HEAD/BRAIN W/O DYE: CPT

## 2021-09-12 PROCEDURE — 99285 EMERGENCY DEPT VISIT HI MDM: CPT | Performed by: EMERGENCY MEDICINE

## 2021-09-12 PROCEDURE — 71045 X-RAY EXAM CHEST 1 VIEW: CPT

## 2021-09-12 PROCEDURE — 84484 ASSAY OF TROPONIN QUANT: CPT | Performed by: EMERGENCY MEDICINE

## 2021-09-12 PROCEDURE — 29130 APPL FINGER SPLINT STATIC: CPT | Performed by: EMERGENCY MEDICINE

## 2021-09-12 PROCEDURE — 93005 ELECTROCARDIOGRAM TRACING: CPT

## 2021-09-12 PROCEDURE — 73030 X-RAY EXAM OF SHOULDER: CPT

## 2021-09-12 PROCEDURE — 85025 COMPLETE CBC W/AUTO DIFF WBC: CPT | Performed by: EMERGENCY MEDICINE

## 2021-09-12 PROCEDURE — 80053 COMPREHEN METABOLIC PANEL: CPT | Performed by: EMERGENCY MEDICINE

## 2021-09-12 PROCEDURE — 36415 COLL VENOUS BLD VENIPUNCTURE: CPT | Performed by: EMERGENCY MEDICINE

## 2021-09-12 PROCEDURE — G1004 CDSM NDSC: HCPCS

## 2021-09-12 PROCEDURE — 73110 X-RAY EXAM OF WRIST: CPT

## 2021-09-12 PROCEDURE — 96374 THER/PROPH/DIAG INJ IV PUSH: CPT

## 2021-09-12 PROCEDURE — 73564 X-RAY EXAM KNEE 4 OR MORE: CPT

## 2021-09-12 PROCEDURE — 99284 EMERGENCY DEPT VISIT MOD MDM: CPT

## 2021-09-12 PROCEDURE — 96361 HYDRATE IV INFUSION ADD-ON: CPT

## 2021-09-12 RX ORDER — LISINOPRIL 10 MG/1
10 TABLET ORAL ONCE
Status: COMPLETED | OUTPATIENT
Start: 2021-09-12 | End: 2021-09-12

## 2021-09-12 RX ORDER — AMLODIPINE BESYLATE 5 MG/1
10 TABLET ORAL ONCE
Status: COMPLETED | OUTPATIENT
Start: 2021-09-12 | End: 2021-09-12

## 2021-09-12 RX ORDER — NAPROXEN 500 MG/1
500 TABLET ORAL 2 TIMES DAILY WITH MEALS
Qty: 30 TABLET | Refills: 0 | Status: SHIPPED | OUTPATIENT
Start: 2021-09-12

## 2021-09-12 RX ORDER — KETOROLAC TROMETHAMINE 30 MG/ML
15 INJECTION, SOLUTION INTRAMUSCULAR; INTRAVENOUS ONCE
Status: COMPLETED | OUTPATIENT
Start: 2021-09-12 | End: 2021-09-12

## 2021-09-12 RX ORDER — OXYCODONE HYDROCHLORIDE 5 MG/1
5 TABLET ORAL EVERY 4 HOURS PRN
Qty: 5 TABLET | Refills: 0 | Status: SHIPPED | OUTPATIENT
Start: 2021-09-12 | End: 2021-09-14

## 2021-09-12 RX ADMIN — SODIUM CHLORIDE 1000 ML: 0.9 INJECTION, SOLUTION INTRAVENOUS at 20:00

## 2021-09-12 RX ADMIN — AMLODIPINE BESYLATE 10 MG: 5 TABLET ORAL at 18:58

## 2021-09-12 RX ADMIN — KETOROLAC TROMETHAMINE 15 MG: 30 INJECTION, SOLUTION INTRAMUSCULAR; INTRAVENOUS at 19:23

## 2021-09-12 RX ADMIN — LISINOPRIL 10 MG: 10 TABLET ORAL at 18:58

## 2021-09-12 NOTE — ED PROVIDER NOTES
History  Chief Complaint   Patient presents with    Fall     pt c/o right wrist pain, left knee pain after falling at work  pt also states he hit his head, -BT     HPI  61 yo M hx of HTN DM presents to ED for eval after a fall  Patient states he was at work, in the back room, slipped on the floor  Braced with right arm on way down  Hit face onto cardboard  No loc  No vision changes, no headaches  Drank 1 bottle of beer before coming  Sustained left shoulder pain, right wrist pain and left knee pain  Localized non radiating 5/10  No pain meds taken pta  No other complaints on ROS  Didn't take BP meds today  Not sure what meds he takes    No thinners  Last tetanus       Prior to Admission Medications   Prescriptions Last Dose Informant Patient Reported? Taking?    DULoxetine (Cymbalta) 30 mg delayed release capsule  Self Yes No   Sig: Take 30 mg by mouth Daily    Empagliflozin (Jardiance) 10 MG TABS  Self Yes No   Sig: Take 10 mg by mouth daily   HYDROcodone-acetaminophen (Norco) 5-325 mg per tablet  Self Yes No   Sig: Take 1 tablet by mouth every 8 (eight) hours as needed    Multiple Vitamin (MULTIVITAMIN) tablet  Self Yes No   Sig: Take 1 tablet by mouth daily   amLODIPine (NORVASC) 10 mg tablet  Self Yes No   Sig: Take 1 tablet by mouth Daily   atorvastatin (LIPITOR) 40 mg tablet  Self Yes No   Sig: TAKE 1 TABLET BY MOUTH NIGHTLY   benazepril (LOTENSIN) 40 MG tablet  Self Yes No   Sig: Take 1 tablet by mouth daily   bisacodyl (DULCOLAX) 5 mg EC tablet   No No   Sig: Take 4 tablets (20 mg total) by mouth once for 1 dose Colonoscopy prep   diazepam (VALIUM) 5 mg tablet  Self No No   Sig: Take 1 tab 40 min prior to MRI with a second dose 10 min before imaging   Patient not taking: Reported on 2021   fluticasone (FLONASE) 50 mcg/act nasal spray   Yes No   Si sprays into each nostril   gabapentin (NEURONTIN) 300 mg capsule   Yes No   Sig: gabapentin 300 mg capsule   glipiZIDE (GLUCOTROL) 10 mg tablet Self Yes No   Sig: Take 10 mg by mouth 2 (two) times a day before meals   ibuprofen (MOTRIN) 800 mg tablet  Self Yes No   Sig: Take 1 tablet by mouth 3 (three) times a day   lisinopril (ZESTRIL) 10 mg tablet   Yes No   Sig: Take 10 mg by mouth daily   meloxicam (MOBIC) 15 mg tablet  Self Yes No   Sig: Take 15 mg by mouth daily   metFORMIN (GLUCOPHAGE) 1000 MG tablet  Self Yes No   Sig: Take 1,000 mg by mouth 2 (two) times a day   pantoprazole (PROTONIX) 40 mg tablet   No No   Sig: TAKE 1 TABLET BY MOUTH EVERY DAY   pregabalin (LYRICA) 75 mg capsule   Yes No   Sig: pregabalin 75 mg capsule      Facility-Administered Medications: None       Past Medical History:   Diagnosis Date    Cervical radiculopathy     Diabetes mellitus ([address] )     Humeral fracture     Right    Hyperlipidemia     Hypertension     Low back pain        Past Surgical History:   Procedure Laterality Date    CARPAL TUNNEL RELEASE      CERVICAL SPINE SURGERY  07/15/2020    ACDF C4/C5    CYST REMOVAL      EYE SURGERY      LEG SURGERY      shrapnel removal    ROTATOR CUFF REPAIR Right     ROTATOR CUFF REPAIR Right     second repair right    ULNAR NERVE TRANSPOSITION         Family History   Adopted: Yes   Problem Relation Age of Onset    Heart failure Mother          at age 64    Skin cancer Father          at age 80     I have reviewed and agree with the history as documented  E-Cigarette/Vaping    E-Cigarette Use Never User      E-Cigarette/Vaping Substances    Nicotine No     THC No     CBD No     Flavoring No     Other No     Unknown No      Social History     Tobacco Use    Smoking status: Former Smoker     Types: Cigars    Smokeless tobacco: Current User     Types: Chew   Vaping Use    Vaping Use: Never used   Substance Use Topics    Alcohol use: Not Currently     Comment: hx of    Drug use: No       Review of Systems   Constitutional: Negative for chills, fatigue and fever     HENT: Negative for nosebleeds and sore throat  Eyes: Negative for redness and visual disturbance  Respiratory: Negative for shortness of breath and wheezing  Cardiovascular: Negative for chest pain and palpitations  Gastrointestinal: Negative for abdominal pain and diarrhea  Endocrine: Negative for polyuria  Genitourinary: Negative for difficulty urinating and testicular pain  Musculoskeletal: Positive for arthralgias  Negative for back pain and neck stiffness  Skin: Negative for rash and wound  Neurological: Negative for seizures, speech difficulty and headaches  Psychiatric/Behavioral: Negative for dysphoric mood and hallucinations  All other systems reviewed and are negative  Physical Exam  Physical Exam  Vitals and nursing note reviewed  Constitutional:       Appearance: He is well-developed  HENT:      Head: Normocephalic and atraumatic  Right Ear: External ear normal       Left Ear: External ear normal    Eyes:      Conjunctiva/sclera: Conjunctivae normal    Cardiovascular:      Rate and Rhythm: Normal rate and regular rhythm  Heart sounds: Normal heart sounds  Pulmonary:      Effort: Pulmonary effort is normal       Breath sounds: Normal breath sounds  No wheezing  Chest:      Chest wall: No tenderness  Abdominal:      General: Bowel sounds are normal       Palpations: Abdomen is soft  Tenderness: There is no abdominal tenderness  There is no guarding  Musculoskeletal:         General: Normal range of motion  Cervical back: Normal range of motion  Skin:     General: Skin is warm and dry  Findings: No rash  Neurological:      Mental Status: He is alert and oriented to person, place, and time  Cranial Nerves: No cranial nerve deficit  Sensory: No sensory deficit  Motor: No abnormal muscle tone        Coordination: Coordination normal          Vital Signs  ED Triage Vitals [09/12/21 1757]   Temperature Pulse Respirations Blood Pressure SpO2   97 7 °F (36 5 °C) 74 19 (!) 224/104 98 %      Temp Source Heart Rate Source Patient Position - Orthostatic VS BP Location FiO2 (%)   Temporal Monitor Sitting Left arm --      Pain Score       9           Vitals:    09/12/21 1930 09/12/21 2030 09/12/21 2130 09/12/21 2145   BP: (!) 212/101 (!) 201/113 (!) 225/99 (!) 193/87   Pulse: 83 62 66 65   Patient Position - Orthostatic VS:             Visual Acuity  Visual Acuity      Most Recent Value   L Pupil Size (mm)  4   R Pupil Size (mm)  3        PERRL 3 to 2 mm    ED Medications  Medications   lisinopril (ZESTRIL) tablet 10 mg (10 mg Oral Given 9/12/21 1858)   amLODIPine (NORVASC) tablet 10 mg (10 mg Oral Given 9/12/21 1858)   sodium chloride 0 9 % bolus 1,000 mL (0 mL Intravenous Stopped 9/12/21 2146)   ketorolac (TORADOL) injection 15 mg (15 mg Intravenous Given 9/12/21 1923)       Diagnostic Studies  Results Reviewed     Procedure Component Value Units Date/Time    Troponin I [[id number]]  (Normal) Collected: 09/12/21 1924    Lab Status: Final result Specimen: Blood from Arm, Left Updated: 09/12/21 1945     Troponin I <0 02 ng/mL     Comprehensive metabolic panel [[id number]]  (Abnormal) Collected: 09/12/21 1924    Lab Status: Final result Specimen: Blood from Arm, Left Updated: 09/12/21 1944     Sodium 141 mmol/L      Potassium 4 0 mmol/L      Chloride 105 mmol/L      CO2 27 mmol/L      ANION GAP 9 mmol/L      BUN 19 mg/dL      Creatinine 0 87 mg/dL      Glucose 157 mg/dL      Calcium 8 7 mg/dL      AST 25 U/L      ALT 52 U/L      Alkaline Phosphatase 45 U/L      Total Protein 7 3 g/dL      Albumin 3 8 g/dL      Total Bilirubin 0 41 mg/dL      eGFR 94 ml/min/1 73sq m     Narrative:      [place] guidelines for Chronic Kidney Disease (CKD):     Stage 1 with normal or high GFR (GFR > 90 mL/min/1 73 square meters)    Stage 2 Mild CKD (GFR = 60-89 mL/min/1 73 square meters)    Stage 3A Moderate CKD (GFR = 45-59 mL/min/1 73 square meters)    Stage 3B Moderate CKD (GFR = 30-44 mL/min/1 73 square meters)    Stage 4 Severe CKD (GFR = 15-29 mL/min/1 73 square meters)    Stage 5 End Stage CKD (GFR <15 mL/min/1 73 square meters)  Note: GFR calculation is accurate only with a steady state creatinine    CBC and differential [[id number]]  (Abnormal) Collected: 09/12/21 1924    Lab Status: Final result Specimen: Blood from Arm, Left Updated: 09/12/21 1929     WBC 6 19 Thousand/uL      RBC 5 02 Million/uL      Hemoglobin 15 0 g/dL      Hematocrit 44 6 %      MCV 89 fL      MCH 29 9 pg      MCHC 33 6 g/dL      RDW 13 4 %      MPV 10 7 fL      Platelets 212 Thousands/uL      nRBC 0 /100 WBCs      Neutrophils Relative 49 %      Immat GRANS % 0 %      Lymphocytes Relative 33 %      Monocytes Relative 15 %      Eosinophils Relative 2 %      Basophils Relative 1 %      Neutrophils Absolute 3 04 Thousands/µL      Immature Grans Absolute 0 01 Thousand/uL      Lymphocytes Absolute 2 06 Thousands/µL      Monocytes Absolute 0 93 Thousand/µL      Eosinophils Absolute 0 12 Thousand/µL      Basophils Absolute 0 03 Thousands/µL                  CT head without contrast   Final Result by Sierra Plasencia MD (09/12 2026)      No acute intracranial hemorrhage, midline shift, or mass effect  Workstation performed: [id number]         XR chest 1 view portable   ED Interpretation by Tab Vega MD (09/12 2053)   No acute cardiopulmonary pathology      Final Result by Yuridia Rubin DO (09/13 8341)      No acute cardiopulmonary disease  Workstation performed: [id number]         XR wrist 3+ views RIGHT   ED Interpretation by Tab Vega MD (09/12 2053)   Abnormal   Suspect trapezium fracture      Final Result by Yuridia Rubin DO (09/13 2119)      Bony irregularity upper dorsal carpus, cannot exclude age indeterminate hamate fracture  Correlate with any point tenderness  If relevant, CT imaging may be helpful  Degenerative changes as above              Workstation performed: [id number]         XR shoulder 2+ views LEFT   ED Interpretation by Francisca Ritchie MD (09/12 2127)   No acute osseous abnormality       Final Result by Kyara West DO (09/13 0910)      No acute osseous abnormality  Degenerative changes as described  Workstation performed: [id number]         XR knee 4+ views left injury   Final Result by Kyara West DO (09/13 7068)      No acute osseous abnormality  Prepatellar soft tissue swelling  Workstation performed: [id number]                    Procedures  Splint application    Date/Time: 9/12/2021 6:44 PM  Performed by: Francisca Ritchie MD  Authorized by: Francisca Ritchie MD   [facility]ts Protocol:  Procedure performed by: Evie Murray)  Consent: Verbal consent obtained  Risks and benefits: risks, benefits and alternatives were discussed  Consent given by: patient  Patient understanding: patient states understanding of the procedure being performed  Patient consent: the patient's understanding of the procedure matches consent given  Procedure consent: procedure consent matches procedure scheduled  Relevant documents: relevant documents present and verified  Test results: test results available and properly labeled  Radiology Images displayed and confirmed  If images not available, report reviewed: imaging studies available  Patient identity confirmed: verbally with patient      Pre-procedure details:     Sensation:  Normal  Procedure details:     Laterality:  Right    Location:  Wrist    Wrist:  R wrist    Splint type:  Thumb spica    Supplies:  Cotton padding and Ortho-Glass  Post-procedure details:     Pain:  Unchanged    Sensation:  Normal    Patient tolerance of procedure:   Tolerated well, no immediate complications             ED Course  ED Course as of Sep 13 1659   Sun Sep 12, 2021   2013 No signs of end organ damage   Creatinine: 0 87   2055 Offered knee immobilizer and crutches, patient declined                                SBIRT 22yo+ Most Recent Value   SBIRT (24 yo +)   In order to provide better care to our patients, we are screening all of our patients for alcohol and drug use  Would it be okay to ask you these screening questions? Yes Filed at: 09/12/2021 1821   Initial Alcohol Screen: US AUDIT-C    1  How often do you have a drink containing alcohol? 2 Filed at: 09/12/2021 1821   2  How many drinks containing alcohol do you have on a typical day you are drinking? 0 Filed at: 09/12/2021 1821   3a  Male UNDER 65: How often do you have five or more drinks on one occasion? 0 Filed at: 09/12/2021 1821   3b  FEMALE Any Age, or MALE 65+: How often do you have 4 or more drinks on one occassion? 0 Filed at: 09/12/2021 1821   Audit-C Score  2 Filed at: 09/12/2021 1821   NILES: How many times in the past year have you    Used an illegal drug or used a prescription medication for non-medical reasons? Never Filed at: 09/12/2021 1821          MDM     61 yo M presents after a fall  xrays performed of shoulder wrist and knee performed  Suspect fx of right wrist  Splinted  Follow up with ortho  Given age and htn, syncope work up performed, though per patient's history appears to be mechanical fall  Asymptomatic htn, no signs of end organ damage  The patient was instructed to follow up as documented  Strict return precautions were discussed with the patient and the patient was instructed to return to the emergency department immediately if symptoms worsen  The patient/patient family member acknowledged and were in agreement with plan  Disposition  Final diagnoses:   Fall, initial encounter   Closed head injury, initial encounter   Closed fracture of right wrist, initial encounter   Acute pain of left knee   Acute pain of left shoulder     Time reflects when diagnosis was documented in both MDM as applicable and the Disposition within this note     Time User Action Codes Description Comment    9/12/2021  9:27 PM Guanakito Ha Fall, initial encounter     9/12/2021  9:27 PM Deandre Solid Add [S09 90XA] Closed head injury, initial encounter     9/12/2021  9:28 PM Deandre Solid Add [S62 101A] Closed fracture of right wrist, initial encounter     9/12/2021  9:28 PM Deandre Solid Add [M25 562] Acute pain of left knee     9/12/2021  9:28 PM Deandre Solid Add [C32 679] Acute pain of left shoulder       ED Disposition     ED Disposition Condition Date/Time Comment    Discharge Stable Sun Sep 12, 2021  9:27 PM Markyjhoan Moffettier discharge to home/self care              Follow-up Information     Follow up With Specialties Details Why Contact Info Additional [address] [facility] Schedule an appointment as soon as possible for a visit in 1 day For follow up regarding your symptoms and recheck [address] [facility] [address]  [address], [phone]          Discharge Medication List as of 9/12/2021  9:32 PM      START taking these medications    Details   naproxen (NAPROSYN) 500 mg tablet Take 1 tablet (500 mg total) by mouth 2 (two) times a day with meals, Starting Sun 9/12/2021, Normal      oxyCODONE (ROXICODONE) 5 mg immediate release tablet Take 1 tablet (5 mg total) by mouth every 4 (four) hours as needed for moderate pain for up to 2 daysMax Daily Amount: 30 mg, Starting Sun 9/12/2021, Until Tue 9/14/2021 at 2359, Normal         CONTINUE these medications which have NOT CHANGED    Details   amLODIPine (NORVASC) 10 mg tablet Take 1 tablet by mouth Daily, Starting Fri 6/28/2019, Historical Med      atorvastatin (LIPITOR) 40 mg tablet TAKE 1 TABLET BY MOUTH NIGHTLY, Historical Med      benazepril (LOTENSIN) 40 MG tablet Take 1 tablet by mouth daily, Starting Fri 6/28/2019, Historical Med      bisacodyl (DULCOLAX) 5 mg EC tablet Take 4 tablets (20 mg total) by mouth once for 1 dose Colonoscopy prep, Starting Mon 5/18/2020, Normal      diazepam (VALIUM) 5 mg tablet Take 1 tab 40 min prior to MRI with a second dose 10 min before imaging, Normal      DULoxetine (Cymbalta) 30 mg delayed release capsule Take 30 mg by mouth Daily , Historical Med      Empagliflozin (Jardiance) 10 MG TABS Take 10 mg by mouth daily, Starting Mon 3/30/2020, Historical Med      fluticasone (FLONASE) 50 mcg/act nasal spray 2 sprays into each nostril, Starting Mon 7/20/2020, Historical Med      gabapentin (NEURONTIN) 300 mg capsule gabapentin 300 mg capsule, Historical Med      glipiZIDE (GLUCOTROL) 10 mg tablet Take 10 mg by mouth 2 (two) times a day before meals, Starting Wed 12/5/2018, Historical Med      HYDROcodone-acetaminophen (Norco) 5-325 mg per tablet Take 1 tablet by mouth every 8 (eight) hours as needed , Historical Med      ibuprofen (MOTRIN) 800 mg tablet Take 1 tablet by mouth 3 (three) times a day, Historical Med      lisinopril (ZESTRIL) 10 mg tablet Take 10 mg by mouth daily, Historical Med      meloxicam (MOBIC) 15 mg tablet Take 15 mg by mouth daily, Historical Med      metFORMIN (GLUCOPHAGE) 1000 MG tablet Take 1,000 mg by mouth 2 (two) times a day, Historical Med      Multiple Vitamin (MULTIVITAMIN) tablet Take 1 tablet by mouth daily, Historical Med      pantoprazole (PROTONIX) 40 mg tablet TAKE 1 TABLET BY MOUTH EVERY DAY, Normal      pregabalin (LYRICA) 75 mg capsule pregabalin 75 mg capsule, Historical Med               PDMP Review       Value Time User    PDMP Reviewed  Yes 5/18/2020  4:31 PM Misael Abel PA-C          ED Provider  Electronically Signed by           Angelo Benton MD  09/13/21 [phone] [id number]

## 2021-09-12 NOTE — Clinical Note
Drew Shannon was seen and treated in our emergency department on 9/12/2021  No work until cleared by Family Doctor/Orthopedics        Diagnosis: Right wrist fracture    Farzad    He may return on this date: If you have any questions or concerns, please don't hesitate to call        Angelo Benton MD    ______________________________           _______________          _______________  [facility]                              Date                                Time

## 2021-09-13 LAB
ATRIAL RATE: 66 BPM
P AXIS: 35 DEGREES
PR INTERVAL: 158 MS
QRS AXIS: 10 DEGREES
QRSD INTERVAL: 86 MS
QT INTERVAL: 390 MS
QTC INTERVAL: 408 MS
T WAVE AXIS: 45 DEGREES
VENTRICULAR RATE: 66 BPM

## 2021-09-13 PROCEDURE — 93010 ELECTROCARDIOGRAM REPORT: CPT | Performed by: INTERNAL MEDICINE

## 2021-09-16 ENCOUNTER — OFFICE VISIT (OUTPATIENT)
Dept: OBGYN CLINIC | Facility: CLINIC | Age: 61
End: 2021-09-16
Payer: OTHER MISCELLANEOUS

## 2021-09-16 VITALS
DIASTOLIC BLOOD PRESSURE: 116 MMHG | WEIGHT: 200 LBS | BODY MASS INDEX: 28.63 KG/M2 | SYSTOLIC BLOOD PRESSURE: 200 MMHG | HEIGHT: 70 IN | HEART RATE: 71 BPM

## 2021-09-16 DIAGNOSIS — S62.101A CLOSED FRACTURE OF RIGHT WRIST, INITIAL ENCOUNTER: Primary | ICD-10-CM

## 2021-09-16 DIAGNOSIS — S80.02XA CONTUSION OF LEFT KNEE, INITIAL ENCOUNTER: ICD-10-CM

## 2021-09-16 PROCEDURE — 99204 OFFICE O/P NEW MOD 45 MIN: CPT | Performed by: FAMILY MEDICINE

## 2021-09-16 NOTE — LETTER
September 16, 2021     Patient: Shaan Sawant   YOB: 1960   Date of Visit: 9/16/2021       To Whom it May Concern:    Shaan Sawant is under my professional care  He was seen in my office on 9/16/2021  He is to wear wrist splint at all times  He is not to do any lifting, pushing, pulling, gripping, or bearing weight on right upper extremity  He will be re-evaluated in 4 weeks  If you have any questions or concerns, please don't hesitate to call           Sincerely,          [employer], DO        CC: No Recipients

## 2021-09-16 NOTE — PROGRESS NOTES
Assessment/Plan:  Assessment/Plan   Diagnoses and all orders for this visit:    Closed fracture of right wrist, initial encounter  -     Cock Up Wrist Splint    Contusion of left knee, initial encounter      50-year-old right-dominant male with right wrist pain from fall injury at work on 09/11/2021  Discussed with patient physical exam, radiographs, impression and plan  X-rays noted for cortical regularity dorsum of the hamate  Physical noted for swelling dorsum of the wrist   He has point tenderness over the dorsal bases of the 4th and 5th metacarpals, and dorsum of the base of 2nd metacarpal   He has limited range of motion of the wrist with extension and flexion due to pain  He has full flexion and extension in all digits of the hand however with pain  He is intact neurovascularly  Clinical impression is that he sustained fracture and sprain to the wrist   I discussed treatment in form of protection/stabilization, topical anti-inflammatory, and rest   Patient was advised that surgical invention is not warranted, and due to having underwent arthritis of the wrist I recommend against cast immobilization which would cause prolonged stiffness of the wrist   I will place him in cock-up splint which he may remove once daily for hygiene purposes and during that time he may also apply topical diclofenac gel  He may continue taking ibuprofen as needed for pain  He will follow up in 4 weeks at which point we will repeat x-rays of the right wrist and he will be re-evaluated  Subjective:   Patient ID: Drew Shannon is a 62 y o  male  Chief Complaint   Patient presents with    Right Wrist - Pain, Fracture       50-year-old right-dominant male presents for evaluation of right wrist pain of onset from fall injury at work on 09/11/2021  He tripped on some plastic/wrapping material and fell forward bracing his fall on his outstretched hand  He had injured his wrist and left knee, as well as head in the process  He did not lose consciousness  He was able to get back up on his own  Pain was more bothersome at the right wrist   He had pain described as sudden in onset generalized to the wrist particular at the dorsum, throbbing and sharp, nonradiating, associated swelling, worse with movement, associated limited range of motion, and improved resting  He is able to continue working despite his symptoms  At home he rested  He started having swelling of the left knee as well  The next day he presented to the emergency room where x-ray evaluation was suspicious for fracture of the right wrist   He was placed in short-arm splint and advised to follow up with orthopedic care  He reports that with attempt of movement of the wrist there is severe sharp pain  Patient's spouse states that he has not been taking the pain medication that was prescribed to him  Wrist Pain  This is a new problem  The current episode started in the past 7 days  The problem occurs daily  The problem has been unchanged  Associated symptoms include arthralgias, joint swelling and weakness  Pertinent negatives include no abdominal pain, chest pain, chills, fever, numbness, rash or sore throat  Exacerbated by: Wrist movement  He has tried rest and immobilization for the symptoms  The treatment provided mild relief  The following portions of the patient's history were reviewed and updated as appropriate: He  has a past medical history of Cervical radiculopathy, Diabetes mellitus ([address] ), Humeral fracture, Hyperlipidemia, Hypertension, and Low back pain  He  has a past surgical history that includes Eye surgery; Cyst Removal; Carpal tunnel release; Rotator cuff repair (Right); Leg Surgery; Ulnar nerve transposition; Cervical spine surgery (07/15/2020); and Rotator cuff repair (Right)  His family history includes Heart failure in his mother; Skin cancer in his father  He was adopted  He  reports that he has quit smoking   His smoking use included cigars  His smokeless tobacco use includes chew  He reports previous alcohol use  He reports that he does not use drugs  He is allergic to pravastatin       Review of Systems   Constitutional: Negative for chills and fever  HENT: Negative for sore throat  Eyes: Negative for visual disturbance  Respiratory: Negative for shortness of breath  Cardiovascular: Negative for chest pain  Gastrointestinal: Negative for abdominal pain  Genitourinary: Negative for flank pain  Musculoskeletal: Positive for arthralgias and joint swelling  Skin: Negative for rash and wound  Neurological: Positive for weakness  Negative for numbness  Hematological: Does not bruise/bleed easily  Psychiatric/Behavioral: Negative for self-injury  Objective:  Vitals:    09/16/21 1032 09/16/21 1042   BP: (!) 203/117 (!) 200/116   Pulse: 67 71   Weight: 90 7 kg (200 lb)    Height: 5' 10" (1 778 m)      Left Knee Exam     Muscle Strength   The patient has normal left knee strength  Tenderness   The patient is experiencing tenderness in the patella  Range of Motion   Extension: normal   Flexion: 120     Other   Left knee swelling: Prepatellar swelling  Right Hand Exam     Muscle Strength   Wrist extension: 4/5   Wrist flexion: 4/5   : 4/5     Tests   Finkelstein's test: negative    Other   Sensation: normal  Pulse: present          Observations     Right Wrist/Hand   Negative for deformity  Tenderness     Right Wrist/Hand   Tenderness in the hamate and triquetrum  No tenderness in the first dorsal compartment, second dorsal compartment, fifth dorsal compartment, sixth dorsal compartment, carpometacarpal joint, distal radioulnar joint, scaphoid, lunate and pisiform       Active Range of Motion     Right Wrist   Wrist flexion: 10 degrees with pain  Wrist extension: 10 degrees with pain    Additional Active Range of Motion Details  Normal range of motion of fingers of right hand    Strength/Myotome Testing     Right Wrist/Hand   Wrist extension: 4  Wrist flexion: 4    Tests     Right Wrist/Hand   Negative AIN OK sign, distal radial-ulnar joint stress and Finkelstein's  Physical Exam  Vitals and nursing note reviewed  Constitutional:       General: He is not in acute distress  Appearance: He is well-developed  He is not ill-appearing or diaphoretic  HENT:      Head: Normocephalic and atraumatic  Right Ear: External ear normal       Left Ear: External ear normal    Eyes:      Conjunctiva/sclera: Conjunctivae normal    Neck:      Trachea: No tracheal deviation  Cardiovascular:      Rate and Rhythm: Normal rate  Pulmonary:      Effort: Pulmonary effort is normal  No respiratory distress  Abdominal:      General: There is no distension  Musculoskeletal:         General: Swelling and tenderness present  No deformity or signs of injury  Right hand: No deformity  Skin:     General: Skin is warm and dry  Coloration: Skin is not jaundiced or pale  Neurological:      Mental Status: He is alert and oriented to person, place, and time  Psychiatric:         Mood and Affect: Mood normal          Behavior: Behavior normal          Thought Content: Thought content normal          Judgment: Judgment normal          I have personally reviewed pertinent films in PACS and my interpretation is Degenerative changes of the wrist and cortical irregularity dorsum of the hamate

## 2021-10-14 VITALS
DIASTOLIC BLOOD PRESSURE: 82 MMHG | SYSTOLIC BLOOD PRESSURE: 133 MMHG | HEART RATE: 81 BPM | HEIGHT: 70 IN | WEIGHT: 200 LBS | BODY MASS INDEX: 28.63 KG/M2

## 2021-10-14 DIAGNOSIS — S46.001D ROTATOR CUFF INJURY, RIGHT, SUBSEQUENT ENCOUNTER: ICD-10-CM

## 2021-10-14 DIAGNOSIS — S69.81XD TFCC (TRIANGULAR FIBROCARTILAGE COMPLEX) INJURY, RIGHT, SUBSEQUENT ENCOUNTER: Primary | ICD-10-CM

## 2021-10-14 PROCEDURE — 99213 OFFICE O/P EST LOW 20 MIN: CPT | Performed by: FAMILY MEDICINE

## 2021-10-14 RX ORDER — KETOROLAC TROMETHAMINE 10 MG/1
10 TABLET, FILM COATED ORAL 2 TIMES DAILY PRN
Qty: 60 TABLET | Refills: 0 | Status: SHIPPED | OUTPATIENT
Start: 2021-10-14

## 2021-11-23 ENCOUNTER — APPOINTMENT (OUTPATIENT)
Dept: RADIOLOGY | Facility: CLINIC | Age: 61
End: 2021-11-23
Payer: COMMERCIAL

## 2021-11-23 VITALS
HEIGHT: 70 IN | SYSTOLIC BLOOD PRESSURE: 149 MMHG | HEART RATE: 89 BPM | DIASTOLIC BLOOD PRESSURE: 89 MMHG | WEIGHT: 200 LBS | BODY MASS INDEX: 28.63 KG/M2

## 2021-11-23 DIAGNOSIS — S46.001D ROTATOR CUFF INJURY, RIGHT, SUBSEQUENT ENCOUNTER: Primary | ICD-10-CM

## 2021-11-23 DIAGNOSIS — S63.501D SPRAIN OF RIGHT WRIST, SUBSEQUENT ENCOUNTER: ICD-10-CM

## 2021-11-23 DIAGNOSIS — M19.031 ARTHRITIS OF RIGHT WRIST: ICD-10-CM

## 2021-11-23 DIAGNOSIS — S46.001D ROTATOR CUFF INJURY, RIGHT, SUBSEQUENT ENCOUNTER: ICD-10-CM

## 2021-11-23 PROCEDURE — 73030 X-RAY EXAM OF SHOULDER: CPT

## 2021-11-23 PROCEDURE — 99214 OFFICE O/P EST MOD 30 MIN: CPT | Performed by: FAMILY MEDICINE

## 2021-11-23 PROCEDURE — 20605 DRAIN/INJ JOINT/BURSA W/O US: CPT | Performed by: FAMILY MEDICINE

## 2021-11-23 RX ADMIN — TRIAMCINOLONE ACETONIDE 20 MG: 40 INJECTION, SUSPENSION INTRA-ARTICULAR; INTRAMUSCULAR at 14:35

## 2021-11-23 RX ADMIN — BUPIVACAINE HYDROCHLORIDE 0.5 ML: 2.5 INJECTION, SOLUTION INFILTRATION; PERINEURAL at 14:35

## 2021-11-23 RX ADMIN — LIDOCAINE HYDROCHLORIDE 1 ML: 10 INJECTION, SOLUTION INFILTRATION; PERINEURAL at 14:35

## 2021-11-29 RX ORDER — TRIAMCINOLONE ACETONIDE 40 MG/ML
20 INJECTION, SUSPENSION INTRA-ARTICULAR; INTRAMUSCULAR
Status: COMPLETED | OUTPATIENT
Start: 2021-11-23 | End: 2021-11-23

## 2021-11-29 RX ORDER — BUPIVACAINE HYDROCHLORIDE 2.5 MG/ML
0.5 INJECTION, SOLUTION INFILTRATION; PERINEURAL
Status: COMPLETED | OUTPATIENT
Start: 2021-11-23 | End: 2021-11-23

## 2021-11-29 RX ORDER — LIDOCAINE HYDROCHLORIDE 10 MG/ML
1 INJECTION, SOLUTION INFILTRATION; PERINEURAL
Status: COMPLETED | OUTPATIENT
Start: 2021-11-23 | End: 2021-11-23

## 2022-02-09 DIAGNOSIS — K22.89 ESOPHAGEAL THICKENING: ICD-10-CM

## 2022-02-10 RX ORDER — PANTOPRAZOLE SODIUM 40 MG/1
TABLET, DELAYED RELEASE ORAL
Qty: 30 TABLET | Refills: 10 | Status: SHIPPED | OUTPATIENT
Start: 2022-02-10

## 2022-02-25 VITALS
SYSTOLIC BLOOD PRESSURE: 151 MMHG | DIASTOLIC BLOOD PRESSURE: 95 MMHG | HEIGHT: 70 IN | BODY MASS INDEX: 29.49 KG/M2 | HEART RATE: 90 BPM | WEIGHT: 206 LBS

## 2022-02-25 DIAGNOSIS — M54.12 RADICULOPATHY, CERVICAL REGION: ICD-10-CM

## 2022-02-25 DIAGNOSIS — S46.011D ROTATOR CUFF STRAIN, RIGHT, SUBSEQUENT ENCOUNTER: Primary | ICD-10-CM

## 2022-02-25 PROCEDURE — 99213 OFFICE O/P EST LOW 20 MIN: CPT | Performed by: FAMILY MEDICINE

## 2022-02-25 NOTE — PROGRESS NOTES
Assessment/Plan:  Assessment/Plan   Diagnoses and all orders for this visit:    Rotator cuff strain, right, subsequent encounter  -     Cancel: Large joint arthrocentesis: R subacromial bursa    Radiculopathy, cervical region  -     Ambulatory Referral to Pain Management; Future  -     Ambulatory Referral to Spine Surgery; Future      29-year-old right-hand-dominant male with onset of right wrist and right shoulder pain from fall injury at work on 09/11/2021  Discussed with patient MRI results, impression and plan  MRI of the right shoulder is unremarkable for recurrent supraspinatus tendon tear  There is mild subscapularis tendinopathy, glenohumeral joint degenerative change with moderate osteophytes and cell chondrosis, mild edema in axillary pouch could be glenohumeral joint synovitis, primary section arthroplasty AC joint and subacromial decompression, small curvilinear fluid collection in the subacromial/subdeltoid bursa reaching could reflect bursitis or sequela of postsurgical change  Physical exam right shoulder noted for range of motion limited to forward flexion of 130°, abduction 90°, and internal rotation lumbar spine  He has 4+/5 external rotation and supraspinatus  There is pain with empty can test maneuver  He has decreased sensation light touch dermatomes C5-C8 in the right upper extremity  He has 4+/5 strength with elbow flexion extension and hand  on the right  Clinical impression is that he may be symptomatic from rotator cuff strain/bursitis  He may benefit from corticosteroid injection however he is 5 days since COVID-19 booster and recommend waiting 2 weeks following vaccine before he may receive corticosteroid injection  He may also be experiencing symptoms due to cervical radiculopathy  He has EMG from 02/28/2020 noted for chronic right C6-C7 radiculopathy, moderate ulnar neuropathy at the elbow on the right    He is status post cervical spine surgery 07/15/2020 and per medical record had also undergone right ulnar nerve surgery at the elbow in May 2019  It is likely that he has underlying issues where aggravated from his fall injury in September 2021  Patient has relocated to [place], [place] and I recommend he follow-up with local orthopedics team regarding his shoulder  I will also refer him to pain management and spine surgeon for further evaluation and recommended treatment regarding right upper extremity numbness  He will follow up with me as needed  Subjective:   Patient ID: Braydon Dotson is a 62 y o  male  Chief Complaint   Patient presents with    Right Shoulder - Follow-up, Pain       59-year-old right-hand-dominant male following up for right shoulder pain onset from fall injury at work on 09/11/2021  He was last seen by me 3 months ago at which point he was referred for MRI of the right shoulder  He denies any changes in symptoms of right shoulder pain  He has been experiencing pain described as generalized to the shoulder, constant, achy and sore and sometimes sharp, nonradiating, worse with move the arm particularly with elevation, associated with limited range of motion, and improved with resting  He still continues to have numbness in the right upper extremity and states this has also worsened since his last visit, and sometimes unaware of objects he is holding  He also feels weaker in the right hand  Shoulder Pain  This is a new problem  The current episode started more than 1 month ago  The problem occurs constantly  The problem has been unchanged  Associated symptoms include arthralgias, numbness and weakness  Pertinent negatives include no joint swelling  Exacerbated by: Arm use  He has tried rest and NSAIDs for the symptoms  The treatment provided mild relief  Review of Systems   Musculoskeletal: Positive for arthralgias  Negative for joint swelling  Neurological: Positive for weakness and numbness  Objective:  Vitals:    02/25/22 1518   BP: 151/95   Pulse: 90   Weight: 93 4 kg (206 lb)   Height: 5' 10" (1 778 m)     Right Hand Exam     Muscle Strength   Right wrist normal muscle strength: 4+/5   Other   Sensation: decreased      Left Hand Exam     Muscle Strength   :  5/5     Other   Sensation: normal      Right Elbow Exam     Muscle Strength   Right elbow normal strength: 4+/5 flexion and extension  Other   Sensation: decreased      Left Elbow Exam     Muscle Strength   Left elbow normal strength: 5/5 flexion and extension  Other   Sensation: normal      Right Shoulder Exam     Range of Motion   Active abduction: 90   Forward flexion: 130     Muscle Strength   Right shoulder normal muscle strength: 4+/5 external rotation and supraspinatus  Abduction: 5/5   Internal rotation: 5/5     Other   Sensation: normal    Comments:  Pain with empty can test      Left Shoulder Exam     Muscle Strength   Abduction: 5/5     Other   Sensation: normal           Strength/Myotome Testing     Right Wrist/Hand   Right wrist normal muscle strength: 4+/5   Physical Exam  Vitals and nursing note reviewed  Constitutional:       General: He is not in acute distress  Appearance: He is well-developed  He is not ill-appearing or diaphoretic  HENT:      Head: Normocephalic and atraumatic  Right Ear: External ear normal       Left Ear: External ear normal    Eyes:      Conjunctiva/sclera: Conjunctivae normal    Neck:      Trachea: No tracheal deviation  Cardiovascular:      Rate and Rhythm: Normal rate  Pulmonary:      Effort: Pulmonary effort is normal  No respiratory distress  Abdominal:      General: There is no distension  Skin:     General: Skin is warm and dry  Coloration: Skin is not jaundiced or pale  Neurological:      Mental Status: He is alert and oriented to person, place, and time     Psychiatric:         Mood and Affect: Mood normal          Behavior: Behavior normal          Thought Content:  Thought content normal          Judgment: Judgment normal                  Procedures

## 2022-02-25 NOTE — LETTER
February 25, 2022     Patient: Shady Szymanski   YOB: 1960   Date of Visit: 2/25/2022       To Whom it May Concern:    Shady Szymanski is under my professional care  He was seen in my office on 2/25/2022  He is not to do any lifting, pushing, pulling, gripping, or bearing weight on right upper extremity  He is to follow-up with local orthopedics and is referred to spine specialist for further evaluation and treatment  If you have any questions or concerns, please don't hesitate to call           Sincerely,          [employer], DO        CC: No Recipients

## 2022-05-13 ENCOUNTER — TELEPHONE (OUTPATIENT)
Dept: OBGYN CLINIC | Facility: HOSPITAL | Age: 62
End: 2022-05-13

## 2022-05-13 NOTE — TELEPHONE ENCOUNTER
Amber from [employer] called asking if we received request for medical records  No request in chart    Glendy Ramirez will refax request

## 2022-12-26 DIAGNOSIS — K22.89 ESOPHAGEAL THICKENING: ICD-10-CM

## 2022-12-27 RX ORDER — PANTOPRAZOLE SODIUM 40 MG/1
TABLET, DELAYED RELEASE ORAL
Qty: 30 TABLET | Refills: 10 | Status: SHIPPED | OUTPATIENT
Start: 2022-12-27

## 2022-12-29 NOTE — TELEPHONE ENCOUNTER
Spoke to patient  Ana Meraz He resides in [place]  He will be establishing  with a Gastroenterologist in that area at some point  Ana Meraz

## 2023-12-09 DIAGNOSIS — K22.89 ESOPHAGEAL THICKENING: ICD-10-CM

## 2023-12-11 RX ORDER — PANTOPRAZOLE SODIUM 40 MG/1
TABLET, DELAYED RELEASE ORAL
Qty: 60 TABLET | Refills: 0 | Status: SHIPPED | OUTPATIENT
Start: 2023-12-11

## 2024-02-05 DIAGNOSIS — K22.89 ESOPHAGEAL THICKENING: ICD-10-CM

## 2024-02-06 RX ORDER — PANTOPRAZOLE SODIUM 40 MG/1
TABLET, DELAYED RELEASE ORAL
Qty: 30 TABLET | Refills: 5 | Status: SHIPPED | OUTPATIENT
Start: 2024-02-06

## 2024-08-01 DIAGNOSIS — K22.89 ESOPHAGEAL THICKENING: ICD-10-CM

## 2024-08-02 RX ORDER — PANTOPRAZOLE SODIUM 40 MG/1
TABLET, DELAYED RELEASE ORAL
Qty: 30 TABLET | Refills: 10 | Status: SHIPPED | OUTPATIENT
Start: 2024-08-02

## 2025-06-15 DIAGNOSIS — K22.89 ESOPHAGEAL THICKENING: ICD-10-CM

## 2025-06-16 RX ORDER — PANTOPRAZOLE SODIUM 40 MG/1
40 TABLET, DELAYED RELEASE ORAL DAILY
Qty: 30 TABLET | Refills: 11 | Status: SHIPPED | OUTPATIENT
Start: 2025-06-16